# Patient Record
Sex: MALE | Race: WHITE
[De-identification: names, ages, dates, MRNs, and addresses within clinical notes are randomized per-mention and may not be internally consistent; named-entity substitution may affect disease eponyms.]

---

## 2017-04-08 ENCOUNTER — HOSPITAL ENCOUNTER (EMERGENCY)
Dept: HOSPITAL 17 - PHED | Age: 67
Discharge: HOME | End: 2017-04-08
Payer: MEDICARE

## 2017-04-08 VITALS
DIASTOLIC BLOOD PRESSURE: 80 MMHG | SYSTOLIC BLOOD PRESSURE: 138 MMHG | OXYGEN SATURATION: 97 % | HEART RATE: 62 BPM | TEMPERATURE: 98.1 F | RESPIRATION RATE: 18 BRPM

## 2017-04-08 VITALS
OXYGEN SATURATION: 97 % | RESPIRATION RATE: 18 BRPM | DIASTOLIC BLOOD PRESSURE: 84 MMHG | HEART RATE: 66 BPM | SYSTOLIC BLOOD PRESSURE: 141 MMHG

## 2017-04-08 VITALS
DIASTOLIC BLOOD PRESSURE: 104 MMHG | RESPIRATION RATE: 20 BRPM | TEMPERATURE: 98.1 F | OXYGEN SATURATION: 95 % | SYSTOLIC BLOOD PRESSURE: 143 MMHG | HEART RATE: 62 BPM

## 2017-04-08 VITALS
OXYGEN SATURATION: 96 % | HEART RATE: 65 BPM | RESPIRATION RATE: 16 BRPM | DIASTOLIC BLOOD PRESSURE: 49 MMHG | SYSTOLIC BLOOD PRESSURE: 102 MMHG

## 2017-04-08 VITALS
HEART RATE: 62 BPM | TEMPERATURE: 98.1 F | SYSTOLIC BLOOD PRESSURE: 138 MMHG | RESPIRATION RATE: 18 BRPM | DIASTOLIC BLOOD PRESSURE: 80 MMHG | OXYGEN SATURATION: 97 %

## 2017-04-08 VITALS — WEIGHT: 142.2 LBS | BODY MASS INDEX: 25.2 KG/M2 | HEIGHT: 63 IN

## 2017-04-08 DIAGNOSIS — Z86.718: ICD-10-CM

## 2017-04-08 DIAGNOSIS — F32.9: ICD-10-CM

## 2017-04-08 DIAGNOSIS — Z79.01: ICD-10-CM

## 2017-04-08 DIAGNOSIS — R10.9: Primary | ICD-10-CM

## 2017-04-08 DIAGNOSIS — J44.9: ICD-10-CM

## 2017-04-08 DIAGNOSIS — I10: ICD-10-CM

## 2017-04-08 DIAGNOSIS — F41.9: ICD-10-CM

## 2017-04-08 DIAGNOSIS — K21.9: ICD-10-CM

## 2017-04-08 DIAGNOSIS — R51: ICD-10-CM

## 2017-04-08 LAB
ALP SERPL-CCNC: 82 U/L (ref 45–117)
ALT SERPL-CCNC: 25 U/L (ref 12–78)
ANION GAP SERPL CALC-SCNC: 9 MEQ/L (ref 5–15)
APTT BLD: 26.5 SEC (ref 24.3–30.1)
AST SERPL-CCNC: 18 U/L (ref 15–37)
BACTERIA #/AREA URNS HPF: (no result) /HPF
BASOPHILS # BLD AUTO: 0.1 TH/MM3 (ref 0–0.2)
BASOPHILS NFR BLD: 0.6 % (ref 0–2)
BILIRUB SERPL-MCNC: 0.3 MG/DL (ref 0.2–1)
BUN SERPL-MCNC: 25 MG/DL (ref 7–18)
CHLORIDE SERPL-SCNC: 108 MEQ/L (ref 98–107)
COLOR UR: YELLOW
COMMENT (UR): (no result)
CULTURE IF INDICATED: (no result)
EOSINOPHIL # BLD: 0.3 TH/MM3 (ref 0–0.4)
EOSINOPHIL NFR BLD: 3.5 % (ref 0–4)
ERYTHROCYTE [DISTWIDTH] IN BLOOD BY AUTOMATED COUNT: 17.3 % (ref 11.6–17.2)
GFR SERPLBLD BASED ON 1.73 SQ M-ARVRAT: 55 ML/MIN (ref 89–?)
GLUCOSE UR STRIP-MCNC: (no result) MG/DL
HCO3 BLD-SCNC: 24.9 MEQ/L (ref 21–32)
HCT VFR BLD CALC: 39.9 % (ref 39–51)
HEMO FLAGS: (no result)
HGB UR QL STRIP: (no result)
INR PPP: 0.9 RATIO
KETONES UR STRIP-MCNC: (no result) MG/DL
LEUKOCYTE ESTERASE UR QL STRIP: (no result) /HPF (ref 0–5)
LYMPHOCYTES # BLD AUTO: 2.4 TH/MM3 (ref 1–4.8)
LYMPHOCYTES NFR BLD AUTO: 25.8 % (ref 9–44)
MCH RBC QN AUTO: 25.7 PG (ref 27–34)
MCHC RBC AUTO-ENTMCNC: 32.5 % (ref 32–36)
MCV RBC AUTO: 78.9 FL (ref 80–100)
MONOCYTES NFR BLD: 6.3 % (ref 0–8)
NEUTROPHILS # BLD AUTO: 5.8 TH/MM3 (ref 1.8–7.7)
NEUTROPHILS NFR BLD AUTO: 63.8 % (ref 16–70)
NITRITE UR QL STRIP: (no result)
PLATELET # BLD: 195 TH/MM3 (ref 150–450)
POTASSIUM SERPL-SCNC: 4.1 MEQ/L (ref 3.5–5.1)
PROTHROMBIN TIME: 9.9 SEC (ref 9.8–11.6)
RBC # BLD AUTO: 5.06 MIL/MM3 (ref 4.5–5.9)
RBC #/AREA URNS HPF: (no result) /HPF (ref 0–3)
SODIUM SERPL-SCNC: 142 MEQ/L (ref 136–145)
SP GR UR STRIP: 1.02 (ref 1–1.03)
SQUAMOUS #/AREA URNS HPF: (no result) /HPF (ref 0–5)
WBC # BLD AUTO: 9.2 TH/MM3 (ref 4–11)

## 2017-04-08 PROCEDURE — 81001 URINALYSIS AUTO W/SCOPE: CPT

## 2017-04-08 PROCEDURE — 85730 THROMBOPLASTIN TIME PARTIAL: CPT

## 2017-04-08 PROCEDURE — 80053 COMPREHEN METABOLIC PANEL: CPT

## 2017-04-08 PROCEDURE — 74177 CT ABD & PELVIS W/CONTRAST: CPT

## 2017-04-08 PROCEDURE — 93005 ELECTROCARDIOGRAM TRACING: CPT

## 2017-04-08 PROCEDURE — 96374 THER/PROPH/DIAG INJ IV PUSH: CPT

## 2017-04-08 PROCEDURE — 83605 ASSAY OF LACTIC ACID: CPT

## 2017-04-08 PROCEDURE — 85025 COMPLETE CBC W/AUTO DIFF WBC: CPT

## 2017-04-08 PROCEDURE — 99285 EMERGENCY DEPT VISIT HI MDM: CPT

## 2017-04-08 PROCEDURE — 70450 CT HEAD/BRAIN W/O DYE: CPT

## 2017-04-08 PROCEDURE — 83690 ASSAY OF LIPASE: CPT

## 2017-04-08 PROCEDURE — 85610 PROTHROMBIN TIME: CPT

## 2017-04-08 PROCEDURE — 71010: CPT

## 2017-04-08 NOTE — PD
Data


Data


Last Documented VS





Vital Signs








  Date Time  Temp Pulse Resp B/P Pulse Ox O2 Delivery O2 Flow Rate FiO2


 


4/8/17 08:36   16     


 


4/8/17 08:36  65  102/49 96 Room Air  


 


4/8/17 03:57 98.1       








Orders





 Complete Blood Count With Diff (4/8/17 04:17)


Comprehensive Metabolic Panel (4/8/17 04:17)


Lipase (4/8/17 04:17)


Lactic Acid (4/8/17 04:17)


Prothrombin Time / Inr (Pt) (4/8/17 04:17)


Act Partial Throm Time (Ptt) (4/8/17 04:17)


Urinalysis - C+S If Indicated (4/8/17 04:17)


Ct Abd/Pel W Iv Contrast(Rout) (4/8/17 04:17)


Iv Access Insert/Monitor (4/8/17 04:17)


Ecg Monitoring (4/8/17 04:17)


Oximetry (4/8/17 04:17)


Ondansetron Inj (Zofran Inj) (4/8/17 04:30)


Sodium Chloride 0.9% Flush (Ns Flush) (4/8/17 04:30)


Electrocardiogram (4/8/17 04:17)


Chest, Single Ap (4/8/17 04:17)


Iohexol 350 Inj (Omnipaque 350 Inj) (4/8/17 05:20)


Ct Brain W/O Iv Contrast(Rout) (4/8/17 )


Acetaminophen (Tylenol) (4/8/17 07:45)





Labs





 Laboratory Tests








Test 4/8/17 4/8/17





 04:10 04:40


 


White Blood Count 9.2 TH/MM3 


 


Red Blood Count 5.06 MIL/MM3 


 


Hemoglobin 13.0 GM/DL 


 


Hematocrit 39.9 % 


 


Mean Corpuscular Volume 78.9 FL 


 


Mean Corpuscular Hemoglobin 25.7 PG 


 


Mean Corpuscular Hemoglobin 32.5 % 





Concent  


 


Red Cell Distribution Width 17.3 % 


 


Platelet Count 195 TH/MM3 


 


Mean Platelet Volume 10.4 FL 


 


Neutrophils (%) (Auto) 63.8 % 


 


Lymphocytes (%) (Auto) 25.8 % 


 


Monocytes (%) (Auto) 6.3 % 


 


Eosinophils (%) (Auto) 3.5 % 


 


Basophils (%) (Auto) 0.6 % 


 


Neutrophils # (Auto) 5.8 TH/MM3 


 


Lymphocytes # (Auto) 2.4 TH/MM3 


 


Monocytes # (Auto) 0.6 TH/MM3 


 


Eosinophils # (Auto) 0.3 TH/MM3 


 


Basophils # (Auto) 0.1 TH/MM3 


 


CBC Comment DIFF FINAL  


 


Differential Comment   


 


Prothrombin Time 9.9 SEC 


 


Prothromb Time International 0.9 RATIO 





Ratio  


 


Activated Partial 26.5 SEC 





Thromboplast Time  


 


Urine Color YELLOW  


 


Urine Turbidity CLEAR  


 


Urine pH 5.5  


 


Urine Specific Gravity 1.019  


 


Urine Protein NEG mg/dL 


 


Urine Glucose (UA) NEG mg/dL 


 


Urine Ketones TRACE mg/dL 


 


Urine Occult Blood NEG  


 


Urine Nitrite NEG  


 


Urine Bilirubin NEG  


 


Urine Leukocyte Esterase NEG  


 


Urine RBC 0-2 /hpf 


 


Urine WBC 0-2 /hpf 


 


Urine Squamous Epithelial 0-5 /hpf 





Cells  


 


Urine Bacteria NONE /hpf 


 


Microscopic Urinalysis Comment CULT NOT 





 INDICATED 


 


Sodium Level 142 MEQ/L 


 


Potassium Level 4.1 MEQ/L 


 


Chloride Level 108 MEQ/L 


 


Carbon Dioxide Level 24.9 MEQ/L 


 


Anion Gap 9 MEQ/L 


 


Blood Urea Nitrogen 25 MG/DL 


 


Creatinine 1.30 MG/DL 


 


Estimat Glomerular Filtration 55 ML/MIN 





Rate  


 


Random Glucose 108 MG/DL 


 


Calcium Level 9.0 MG/DL 


 


Total Bilirubin 0.3 MG/DL 


 


Aspartate Amino Transf 18 U/L 





(AST/SGOT)  


 


Alanine Aminotransferase 25 U/L 





(ALT/SGPT)  


 


Alkaline Phosphatase 82 U/L 


 


Total Protein 7.2 GM/DL 


 


Albumin 3.9 GM/DL 


 


Lipase 148 U/L 


 


Lactic Acid Level  1.4 mmol/L











[facility]


Supervised Visit with DIDIER:  No


Narrative Course


This case is checked out to me by Dr. Lagos at 7 AM.





I have evaluated the patient and reviewed the entirety of the workup with him.


At this Point he is minimally symptomatic.  Stable for [facility] follow[facility] 

[facility]


Brain CT is negative


Abdominal CT is reviewed with him.  There is a vague right inguinal canal soft 

tissue fullness or hematoma that's hard to specifically define on CT.


There are no objective findings on exam and he has no pain there.  His INR is 

0.9





Stable for outpatient follow-up.  He is to discuss the finding with his 

physician.


Diagnosis





 Primary Impression:  


 Abdominal pain


 Qualified Code:  R10.9 - Abdominal pain, unspecified location


 Additional Impression:  


 Headache


 Qualified Code:  R51 - Acute nonintractable headache, unspecified headache type





***Additional Instruction:


The patient was advised to follow up with their physician and return if they 

worsen.


Discuss CT scan of abdomen findings with your physician


***Med/Other Pt SpecificInfo:  Other


Disposition:  01 DISCHARGE HOME


Condition:  Stable








Roshan Chacko MD Apr 8, 2017 08:43

## 2017-04-08 NOTE — PD
HPI


Chief Complaint:  Abdominal Pain


Time Seen by Provider:  04:17


Travel History


International Travel<30 days:  No


Contact w/Intl Traveler<30days:  No


Traveled to known affect area:  No





History of Present Illness


HPI


66-year-old male presents to the emergency department for complaint of 3 days 

of abdominal pain with intermittent nausea and vomiting.  Patient denies fever 

chills.  Patient's had no diarrhea or constipation.  No report of bilious 

emesis hematemesis or coffee-ground emesis also denies any melena hematochezia.

  Patient denies any dysuria frequency urgency flank pain or hematuria.  

Patient denies any injury or trauma.  Patient reports pain is epigastric and 

periumbilical.  Patient is unable to identify exacerbating or alleviating 

factors.  Patient reports that he has chronic abdominal pain.  Patient takes 

diltiazem for hypertension.  Patient also reports history of COPD anxiety and 

DVT.  Patient is status post appendectomy.  Patient reports he has taken his 

last dose of Coumadin as of Thursday evening.  Patient states he typically has 

[facility] but has not been to the [facility] for at least a month 

or longer.  Patient does not report increased bruising, gingival bleeding, 

epistaxis, hemoptysis, hematemesis, coffee-ground emesis, hematuria, melena or 

hematochezia.





[facility]


Past Medical History


*** Narrative Medical


Medical record and nursing notes reviewed


Hx Anticoagulant Therapy:  Yes (OUT OF COUMADIN PAST COUPLE OF WEEKS)


Asthma:  No


Anxiety:  Yes


Depression:  Yes


Heart Rhythm Problems:  Yes (irregular heart beat)


Cancer:  No


Cardiovascular Problems:  Yes


High Cholesterol:  No


Chest Pain:  No


Congestive Heart Failure:  No


COPD:  Yes


Diminished Hearing:  No


Deep Vein Thrombosis:  Yes (PE)


Endocrine:  No


GERD:  Yes


Genitourinary:  Yes


Hypertension:  Yes


Implanted Vascular Access Dvce:  No


Musculoskeletal:  No


Neurologic:  No


Psychiatric:  Yes


Reproductive:  No


Respiratory:  Yes (COPD)


Immunizations Current:  No


Tetanus Vaccination:  Unknown


Influenza Vaccination:  No





Past Surgical History


Abdominal Surgery:  Yes (APPENDECTOMY AS A CHILD.)


Appendectomy:  Yes


Tonsillectomy:  Yes


Other Surgery:  Yes





Social History


Alcohol Use:  Yes (ABT 4 - 16 OZ DAILY)


Tobacco Use:  Yes (2 PPD)


Substance Use:  No





Allergies-Medications


(Allergen,Severity, Reaction):  


Coded Allergies:  


     No Known Allergies (Verified , 4/8/17)


Reported Meds & Prescriptions





Reported Meds & Active Scripts


Active


Reported


Coumadin (Warfarin) 5 Mg Tab 5 Mg PO DAILY


Diltiazem CD 24  Mg Caper 240 Mg PO DAILY





Review of Systems


Except as stated in HPI:  all other systems reviewed are Neg


General / Constitutional:  No: Fever, Chills


HENT:  No: Congestion


Cardiovascular:  No: Chest Pain or Discomfort


Respiratory:  No: Cough, Shortness of Breath


Gastrointestinal:  Positive: Nausea, Vomiting, Abdominal Pain,  No: Diarrhea, 

Hematemesis, Hematochezia


Genitourinary:  No: Urgency, Frequency, Dysuria, Hematuria


Musculoskeletal:  No: Myalgias, Arthralgias


Skin:  No Rash


Neurologic:  No: Weakness, Dizziness, Syncope


Psychiatric:  No: Anxiety


Endocrine:  No: Heat Intolerance


Hematologic/Lymphatic:  No: Easy Bruising





Physical Exam


Narrative


GENERAL: Well-developed well-nourished nourished disheveled male in no apparent 

distress no respiratory distress


SKIN: Warm and dry.


HEAD: Normocephalic.


EYES: No scleral icterus. No injection or drainage. 


NECK: Supple, trachea midline. No JVD or lymphadenopathy.


CARDIOVASCULAR: Regular rate and rhythm without murmurs, gallops, or rubs. 


RESPIRATORY: Breath sounds equal bilaterally. No accessory muscle use.


GASTROINTESTINAL: Abdomen soft, mild epigastric and focal tenderness no 

guarding no rebound no palpable pulsatile mass, nondistended. 


MUSCULOSKELETAL: No cyanosis, or edema. 


BACK: Nontender without obvious deformity. No CVA tenderness.








Data


Data


Last Documented VS





Vital Signs








  Date Time  Temp Pulse Resp B/P Pulse Ox O2 Delivery O2 Flow Rate FiO2


 


4/8/17 06:24  66 18 141/84 97 Room Air  


 


4/8/17 03:57 98.1       








Orders





 Complete Blood Count With Diff (4/8/17 04:17)


Comprehensive Metabolic Panel (4/8/17 04:17)


Lipase (4/8/17 04:17)


Lactic Acid (4/8/17 04:17)


Prothrombin Time / Inr (Pt) (4/8/17 04:17)


Act Partial Throm Time (Ptt) (4/8/17 04:17)


Urinalysis - C+S If Indicated (4/8/17 04:17)


Ct Abd/Pel W Iv Contrast(Rout) (4/8/17 04:17)


Iv Access Insert/Monitor (4/8/17 04:17)


Ecg Monitoring (4/8/17 04:17)


Oximetry (4/8/17 04:17)


Ondansetron Inj (Zofran Inj) (4/8/17 04:30)


Sodium Chloride 0.9% Flush (Ns Flush) (4/8/17 04:30)


Electrocardiogram (4/8/17 04:17)


Chest, Single Ap (4/8/17 04:17)


Iohexol 350 Inj (Omnipaque 350 Inj) (4/8/17 05:20)


Ct Brain W/O Iv Contrast(Rout) (4/8/17 )





Labs





 Laboratory Tests








Test 4/8/17 4/8/17





 04:10 04:40


 


White Blood Count 9.2 TH/MM3 


 


Red Blood Count 5.06 MIL/MM3 


 


Hemoglobin 13.0 GM/DL 


 


Hematocrit 39.9 % 


 


Mean Corpuscular Volume 78.9 FL 


 


Mean Corpuscular Hemoglobin 25.7 PG 


 


Mean Corpuscular Hemoglobin 32.5 % 





Concent  


 


Red Cell Distribution Width 17.3 % 


 


Platelet Count 195 TH/MM3 


 


Mean Platelet Volume 10.4 FL 


 


Neutrophils (%) (Auto) 63.8 % 


 


Lymphocytes (%) (Auto) 25.8 % 


 


Monocytes (%) (Auto) 6.3 % 


 


Eosinophils (%) (Auto) 3.5 % 


 


Basophils (%) (Auto) 0.6 % 


 


Neutrophils # (Auto) 5.8 TH/MM3 


 


Lymphocytes # (Auto) 2.4 TH/MM3 


 


Monocytes # (Auto) 0.6 TH/MM3 


 


Eosinophils # (Auto) 0.3 TH/MM3 


 


Basophils # (Auto) 0.1 TH/MM3 


 


CBC Comment DIFF FINAL  


 


Differential Comment   


 


Prothrombin Time 9.9 SEC 


 


Prothromb Time International 0.9 RATIO 





Ratio  


 


Activated Partial 26.5 SEC 





Thromboplast Time  


 


Urine Color YELLOW  


 


Urine Turbidity CLEAR  


 


Urine pH 5.5  


 


Urine Specific Gravity 1.019  


 


Urine Protein NEG mg/dL 


 


Urine Glucose (UA) NEG mg/dL 


 


Urine Ketones TRACE mg/dL 


 


Urine Occult Blood NEG  


 


Urine Nitrite NEG  


 


Urine Bilirubin NEG  


 


Urine Leukocyte Esterase NEG  


 


Urine RBC 0-2 /hpf 


 


Urine WBC 0-2 /hpf 


 


Urine Squamous Epithelial 0-5 /hpf 





Cells  


 


Urine Bacteria NONE /hpf 


 


Microscopic Urinalysis Comment CULT NOT 





 INDICATED 


 


Sodium Level 142 MEQ/L 


 


Potassium Level 4.1 MEQ/L 


 


Chloride Level 108 MEQ/L 


 


Carbon Dioxide Level 24.9 MEQ/L 


 


Anion Gap 9 MEQ/L 


 


Blood Urea Nitrogen 25 MG/DL 


 


Creatinine 1.30 MG/DL 


 


Estimat Glomerular Filtration 55 ML/MIN 





Rate  


 


Random Glucose 108 MG/DL 


 


Calcium Level 9.0 MG/DL 


 


Total Bilirubin 0.3 MG/DL 


 


Aspartate Amino Transf 18 U/L 





(AST/SGOT)  


 


Alanine Aminotransferase 25 U/L 





(ALT/SGPT)  


 


Alkaline Phosphatase 82 U/L 


 


Total Protein 7.2 GM/DL 


 


Albumin 3.9 GM/DL 


 


Lipase 148 U/L 


 


Lactic Acid Level  1.4 mmol/L











MDM


Medical Decision Making


Medical Screen Exam Complete:  Yes


Emergency Medical Condition:  Yes


Medical Record Reviewed:  Yes (EKG normal sinus rhythm rate 60 no acute ST 

elevation or injury pattern change noted)


Interpretation(s)


EKG: Sinus rhythm rate 60 no acute ST elevation or injury pattern change noted


Differential Diagnosis


Abdominal pain, gastritis, peptic ulcer disease, cholecystitis, pancreatitis, 

colitis, diverticulitis, UTI, hernia, small bowel obstruction


Narrative Course


IV access obtained specimens collected and sent for resulting; EKG performed 

which reveals no acute ST elevation or injury pattern


CT abdomen and pelvis ordered with IV contrast


CBC with automated differential no leukocytosis anemia Pancytopenia or Marked 

Left Shift; Metabolic Panel Remarkable for Elevation of BUN Otherwise Values 

Grossly within Normal Range; Lactic Acid Is Not Elevated 1.4; INR Is 0.9 Not 

Prolonged; Urinalysis Is Grossly Within Normal Limits


CT Abdomen and Pelvis Reveals No Acute Intra-Abdominal or Pelvic Pathology 

Other Than Radiologist Comments That There Is an Area of Possible Soft Tissue 

Swelling to the Right Groin That May Be Consistent with adenopathy or fluid 

collection or possible hematoma.  Patient is reexamined and has no tenderness 

or soft tissue swelling to the groin area.  Patient reports that he is not here 

because of his abdominal pain he is here because he has a headache.  CT brain 

noncontrast ordered in view of patient's history of Coumadin treatment.  

Patient has no neurologic focality on exam.  Patient is normotensive with out 

evidence of tachycardia.  Hemoglobin is stable.  INR is 0.9 suspects that soft 

tissue swelling is consistent with adenopathy as opposed to fluid collection or 

hematoma.


CT brain noncontrast has been ordered and care signed over to oncoming Nirali Gould MD Apr 8, 2017 04:33

## 2017-04-08 NOTE — RADHPO
EXAM DATE/TIME:  04/08/2017 04:56 

 

[facility] COMPARISON:     

CHEST SINGLE AP, October 10, 2016, 21:36.

 

                     

INDICATIONS :     

Shortness of breath. 

                     

 

MEDICAL HISTORY :     

Hypertension.  Chronic obstructive pulmonary disease.  Deep venous thrombosis.      

 

SURGICAL HISTORY :     

Appendectomy.   

 

ENCOUNTER:     

Initial                                        

 

ACUITY:     

1 day      

 

PAIN SCORE:     

6/10

 

LOCATION:     

Bilateral chest 

 

FINDINGS:     

A single view of the chest demonstrates the lungs to be symmetrically aerated without evidence of mas
s, infiltrate or effusion.  The cardiomediastinal contours are unremarkable. There is minimal persist
ent blunting of the left costophrenic angle. There is chronic remodeling at the humeral heads bilater
ally with degenerative change at the glenohumeral joints.

 

CONCLUSION:     

Minimal blunting of the left costophrenic angle

 

 

 

 Jorge Phan MD on April 08, 2017 at 5:09           

Board Certified Radiologist.

 This report was verified electronically.

## 2017-04-08 NOTE — RADHPO
EXAM DATE/TIME:  04/08/2017 05:03 

 

[facility] COMPARISON:     

CT ABDOMEN & PELVIS W CONTRAST, May 05, 2011, 16:32.

 

 

INDICATIONS :     

Abdominal pain with nausea and vomiting. 

                      

 

IV CONTRAST:     

95 cc Omnipaque 350 (iohexol) IV 

 

 

ORAL CONTRAST:      

No oral contrast ingested.

                      

 

RADIATION DOSE:     

13.71 CTDIvol (mGy) 

 

 

MEDICAL HISTORY :     

Deep venous thrombosis. Chronic obstructive pulmonary disease. Gastroesophageal reflux disease.

 

SURGICAL HISTORY :      

Appendectomy. 

 

ENCOUNTER:      

Initial

 

ACUITY:      

1 day

 

PAIN SCALE:      

7/10

 

LOCATION:         

Abdomen. 

 

TECHNIQUE:     

Volumetric scanning of the abdomen and pelvis was performed.  Using automated exposure control and ad
justment of the mA and/or kV according to patient size, radiation dose was kept as low as reasonably 
achievable to obtain optimal diagnostic quality images. 

 

FINDINGS:     

 

LOWER LUNGS:     

The visualized lower lungs are clear.

 

LIVER:     

Homogeneous density without lesion.  There is no dilation of the biliary tree.  No calcified gallston
es.

 

SPLEEN:     

Normal size without lesion.

 

PANCREAS:     

Within normal limits.

 

KIDNEYS:     

Normal in size and shape.  There is no mass, stone or hydronephrosis.

 

ADRENAL GLANDS:     

Within normal limits.

 

VASCULAR:     

There is no aortic aneurysm. There are scattered vascular calcifications seen.

 

BOWEL/MESENTERY:     

The stomach, small bowel, and colon demonstrate no acute abnormality.  There is no free intraperitone
al air or fluid.

 

ABDOMINAL WALL:     

Within normal limits.

 

RETROPERITONEUM:     

There is no lymphadenopathy. There is an IVC filter in place.

 

BLADDER:     

No wall thickening or mass. 

 

REPRODUCTIVE:     

Within normal limits.

 

INGUINAL:     

There is 3.1 cm lobulated soft tissue density in the right inguinal region. This does not clearly rep
resent bowel.

 

MUSCULOSKELETAL:     

There is degenerative change in the lumbar spine.

 

CONCLUSION:     

1. Lobulated soft tissue density in the right inguinal canal potentially related to adenopathy or com
plex fluid. An evolving hematoma could have this appearance. This does not appear to be related to silvino
wel.

2. Atherosclerotic change throughout the arterial system.

3. Degenerative change in the lumbar spine.

 

 

 

 Jorge Phan MD on April 08, 2017 at 5:34           

Board Certified Radiologist.

 This report was verified electronically.

## 2017-04-08 NOTE — RADHPO
EXAM DATE/TIME:  04/08/2017 07:51 

 

[facility] COMPARISON:     

No previous studies available for comparison.

 

 

INDICATIONS :     

Cephalgia. 

                    

 

RADIATION DOSE:     

60.67 CTDIvol (mGy) 

 

 

 

MEDICAL HISTORY :     

Chronic obstructive pulmonary disease. Gastroesophageal reflux disease. Deep venous thrombosis.

 

SURGICAL HISTORY :      

Appendectomy. 

 

ENCOUNTER:      

Initial

 

ACUITY:      

1 day

 

PAIN SCALE:      

2/10

 

LOCATION:        

cranial 

 

TECHNIQUE:     

Multiple contiguous axial images were obtained of the head.  Using automated exposure control and adj
ustment of the mA and/or kV according to patient size, radiation dose was kept as low as reasonably a
chievable to obtain optimal diagnostic quality images. 

 

FINDINGS:     

 

CEREBRUM:     

The ventricles are normal for age.  No evidence of midline shift, mass lesion, hemorrhage or acute in
farction.  No extra-axial fluid collections are seen.

 

POSTERIOR FOSSA:     

The cerebellum and brainstem are intact.  The 4th ventricle is midline.  The cerebellopontine angle i
s unremarkable.

 

EXTRACRANIAL:     

The visualized portion of the orbits is intact.

 

SKULL:     

The calvaria is intact.  No evidence of skull fracture.

 

CONCLUSION:     

Normal examination.  

 

 

 

 Kim Busby MD on April 08, 2017 at 8:29           

Board Certified Radiologist.

 This report was verified electronically.

## 2017-04-08 NOTE — EKG
Date Performed: 04/08/2017       Time Performed: 04:26:04

 

PTAGE:      66 years

 

EKG:      Possible ectopic atrial rhythm. Lateral T wave changes are nonspecific Compared to prior tr
acing no significant change Borderline ECG

 

PREVIOUS TRACING       : 10/11/2016 04.47

 

DOCTOR:   Thaddeus Moran  Interpretating Date/Time  04/08/2017 14:34:00

## 2018-02-12 ENCOUNTER — HOSPITAL ENCOUNTER (INPATIENT)
Dept: HOSPITAL 17 - NEPC | Age: 68
LOS: 2 days | Discharge: NURSING, REHAB OR LONG-TERM CARE | DRG: 189 | End: 2018-02-14
Attending: HOSPITALIST | Admitting: HOSPITALIST
Payer: MEDICARE

## 2018-02-12 VITALS
SYSTOLIC BLOOD PRESSURE: 103 MMHG | OXYGEN SATURATION: 100 % | HEART RATE: 95 BPM | RESPIRATION RATE: 35 BRPM | TEMPERATURE: 97.8 F | DIASTOLIC BLOOD PRESSURE: 77 MMHG

## 2018-02-12 VITALS
SYSTOLIC BLOOD PRESSURE: 126 MMHG | DIASTOLIC BLOOD PRESSURE: 87 MMHG | HEART RATE: 108 BPM | TEMPERATURE: 98.3 F | RESPIRATION RATE: 15 BRPM | OXYGEN SATURATION: 100 %

## 2018-02-12 VITALS
TEMPERATURE: 99.5 F | DIASTOLIC BLOOD PRESSURE: 62 MMHG | OXYGEN SATURATION: 100 % | RESPIRATION RATE: 18 BRPM | HEART RATE: 80 BPM | SYSTOLIC BLOOD PRESSURE: 113 MMHG

## 2018-02-12 VITALS
OXYGEN SATURATION: 92 % | DIASTOLIC BLOOD PRESSURE: 98 MMHG | SYSTOLIC BLOOD PRESSURE: 182 MMHG | RESPIRATION RATE: 18 BRPM | HEART RATE: 158 BPM

## 2018-02-12 VITALS
RESPIRATION RATE: 37 BRPM | TEMPERATURE: 98.5 F | HEART RATE: 105 BPM | SYSTOLIC BLOOD PRESSURE: 124 MMHG | OXYGEN SATURATION: 88 % | DIASTOLIC BLOOD PRESSURE: 82 MMHG

## 2018-02-12 VITALS — HEART RATE: 80 BPM

## 2018-02-12 VITALS
DIASTOLIC BLOOD PRESSURE: 76 MMHG | OXYGEN SATURATION: 97 % | SYSTOLIC BLOOD PRESSURE: 133 MMHG | HEART RATE: 89 BPM | RESPIRATION RATE: 22 BRPM | TEMPERATURE: 98.4 F

## 2018-02-12 VITALS — OXYGEN SATURATION: 98 %

## 2018-02-12 VITALS — HEIGHT: 63 IN | WEIGHT: 127.87 LBS | BODY MASS INDEX: 22.66 KG/M2

## 2018-02-12 VITALS
HEART RATE: 81 BPM | OXYGEN SATURATION: 100 % | DIASTOLIC BLOOD PRESSURE: 62 MMHG | SYSTOLIC BLOOD PRESSURE: 102 MMHG | RESPIRATION RATE: 18 BRPM

## 2018-02-12 VITALS — HEART RATE: 153 BPM

## 2018-02-12 VITALS — DIASTOLIC BLOOD PRESSURE: 58 MMHG | OXYGEN SATURATION: 94 % | HEART RATE: 85 BPM | SYSTOLIC BLOOD PRESSURE: 100 MMHG

## 2018-02-12 VITALS — OXYGEN SATURATION: 94 %

## 2018-02-12 VITALS — OXYGEN SATURATION: 100 %

## 2018-02-12 VITALS — HEART RATE: 122 BPM

## 2018-02-12 DIAGNOSIS — I48.91: ICD-10-CM

## 2018-02-12 DIAGNOSIS — K21.9: ICD-10-CM

## 2018-02-12 DIAGNOSIS — J44.1: ICD-10-CM

## 2018-02-12 DIAGNOSIS — E87.2: ICD-10-CM

## 2018-02-12 DIAGNOSIS — Z86.711: ICD-10-CM

## 2018-02-12 DIAGNOSIS — F10.20: ICD-10-CM

## 2018-02-12 DIAGNOSIS — I50.9: ICD-10-CM

## 2018-02-12 DIAGNOSIS — R74.8: ICD-10-CM

## 2018-02-12 DIAGNOSIS — N17.9: ICD-10-CM

## 2018-02-12 DIAGNOSIS — J98.11: ICD-10-CM

## 2018-02-12 DIAGNOSIS — F32.9: ICD-10-CM

## 2018-02-12 DIAGNOSIS — F41.9: ICD-10-CM

## 2018-02-12 DIAGNOSIS — Z86.718: ICD-10-CM

## 2018-02-12 DIAGNOSIS — R73.9: ICD-10-CM

## 2018-02-12 DIAGNOSIS — J96.02: Primary | ICD-10-CM

## 2018-02-12 DIAGNOSIS — Z79.01: ICD-10-CM

## 2018-02-12 DIAGNOSIS — I16.1: ICD-10-CM

## 2018-02-12 DIAGNOSIS — I11.0: ICD-10-CM

## 2018-02-12 DIAGNOSIS — F17.200: ICD-10-CM

## 2018-02-12 DIAGNOSIS — Z91.19: ICD-10-CM

## 2018-02-12 LAB
BASOPHILS # BLD AUTO: 0 TH/MM3 (ref 0–0.2)
BASOPHILS # BLD AUTO: 0.1 TH/MM3 (ref 0–0.2)
BASOPHILS NFR BLD: 0.2 % (ref 0–2)
BASOPHILS NFR BLD: 0.5 % (ref 0–2)
BUN SERPL-MCNC: 33 MG/DL (ref 7–18)
BUN SERPL-MCNC: 35 MG/DL (ref 7–18)
CALCIUM SERPL-MCNC: 7.8 MG/DL (ref 8.5–10.1)
CALCIUM SERPL-MCNC: 8.2 MG/DL (ref 8.5–10.1)
CHLORIDE SERPL-SCNC: 103 MEQ/L (ref 98–107)
CHLORIDE SERPL-SCNC: 106 MEQ/L (ref 98–107)
COLOR UR: (no result)
CREAT SERPL-MCNC: 1.25 MG/DL (ref 0.6–1.3)
CREAT SERPL-MCNC: 1.34 MG/DL (ref 0.6–1.3)
EOSINOPHIL # BLD: 0.1 TH/MM3 (ref 0–0.4)
EOSINOPHIL # BLD: 0.1 TH/MM3 (ref 0–0.4)
EOSINOPHIL NFR BLD: 0.3 % (ref 0–4)
EOSINOPHIL NFR BLD: 0.5 % (ref 0–4)
ERYTHROCYTE [DISTWIDTH] IN BLOOD BY AUTOMATED COUNT: 19.1 % (ref 11.6–17.2)
ERYTHROCYTE [DISTWIDTH] IN BLOOD BY AUTOMATED COUNT: 20.1 % (ref 11.6–17.2)
GFR SERPLBLD BASED ON 1.73 SQ M-ARVRAT: 53 ML/MIN (ref 89–?)
GFR SERPLBLD BASED ON 1.73 SQ M-ARVRAT: 57 ML/MIN (ref 89–?)
GLUCOSE SERPL-MCNC: 208 MG/DL (ref 74–106)
GLUCOSE SERPL-MCNC: 82 MG/DL (ref 74–106)
GLUCOSE UR STRIP-MCNC: (no result) MG/DL
HCO3 BLD-SCNC: 26.1 MEQ/L (ref 21–32)
HCO3 BLD-SCNC: 31.3 MEQ/L (ref 21–32)
HCT VFR BLD CALC: 37 % (ref 39–51)
HCT VFR BLD CALC: 38.5 % (ref 39–51)
HGB BLD-MCNC: 11.5 GM/DL (ref 13–17)
HGB BLD-MCNC: 11.9 GM/DL (ref 13–17)
HGB UR QL STRIP: (no result)
HYALINE CASTS #/AREA URNS LPF: 1 /LPF
INR PPP: 1.1 RATIO
KETONES UR STRIP-MCNC: (no result) MG/DL
LYMPHOCYTES # BLD AUTO: 0.9 TH/MM3 (ref 1–4.8)
LYMPHOCYTES # BLD AUTO: 1.5 TH/MM3 (ref 1–4.8)
LYMPHOCYTES NFR BLD AUTO: 6.5 % (ref 9–44)
LYMPHOCYTES NFR BLD AUTO: 7.6 % (ref 9–44)
MAGNESIUM SERPL-MCNC: 2.2 MG/DL (ref 1.5–2.5)
MCH RBC QN AUTO: 23.1 PG (ref 27–34)
MCH RBC QN AUTO: 23.4 PG (ref 27–34)
MCHC RBC AUTO-ENTMCNC: 30.9 % (ref 32–36)
MCHC RBC AUTO-ENTMCNC: 31.1 % (ref 32–36)
MCV RBC AUTO: 74.2 FL (ref 80–100)
MCV RBC AUTO: 75.8 FL (ref 80–100)
MONOCYTE #: 0.4 TH/MM3 (ref 0–0.9)
MONOCYTE #: 0.5 TH/MM3 (ref 0–0.9)
MONOCYTES NFR BLD: 2.3 % (ref 0–8)
MONOCYTES NFR BLD: 3.8 % (ref 0–8)
MUCOUS THREADS #/AREA URNS LPF: (no result) /LPF
NEUTROPHILS # BLD AUTO: 11.8 TH/MM3 (ref 1.8–7.7)
NEUTROPHILS # BLD AUTO: 17.4 TH/MM3 (ref 1.8–7.7)
NEUTROPHILS NFR BLD AUTO: 89 % (ref 16–70)
NEUTROPHILS NFR BLD AUTO: 89.3 % (ref 16–70)
NITRITE UR QL STRIP: (no result)
PLATELET # BLD: 116 TH/MM3 (ref 150–450)
PLATELET # BLD: 161 TH/MM3 (ref 150–450)
PMV BLD AUTO: 10.1 FL (ref 7–11)
PMV BLD AUTO: 9.2 FL (ref 7–11)
PROTHROMBIN TIME: 11.3 SEC (ref 9.8–11.6)
RBC # BLD AUTO: 4.98 MIL/MM3 (ref 4.5–5.9)
RBC # BLD AUTO: 5.07 MIL/MM3 (ref 4.5–5.9)
SODIUM SERPL-SCNC: 143 MEQ/L (ref 136–145)
SODIUM SERPL-SCNC: 145 MEQ/L (ref 136–145)
SP GR UR STRIP: 1.01 (ref 1–1.03)
SQUAMOUS #/AREA URNS HPF: <1 /HPF (ref 0–5)
TROPONIN I SERPL-MCNC: 0.11 NG/ML (ref 0.02–0.05)
TROPONIN I SERPL-MCNC: 0.18 NG/ML (ref 0.02–0.05)
URINE LEUKOCYTE ESTERASE: (no result)
WBC # BLD AUTO: 13.2 TH/MM3 (ref 4–11)
WBC # BLD AUTO: 19.5 TH/MM3 (ref 4–11)

## 2018-02-12 PROCEDURE — 83735 ASSAY OF MAGNESIUM: CPT

## 2018-02-12 PROCEDURE — 93306 TTE W/DOPPLER COMPLETE: CPT

## 2018-02-12 PROCEDURE — 85027 COMPLETE CBC AUTOMATED: CPT

## 2018-02-12 PROCEDURE — 94060 EVALUATION OF WHEEZING: CPT

## 2018-02-12 PROCEDURE — 82805 BLOOD GASES W/O2 SATURATION: CPT

## 2018-02-12 PROCEDURE — 83605 ASSAY OF LACTIC ACID: CPT

## 2018-02-12 PROCEDURE — 87040 BLOOD CULTURE FOR BACTERIA: CPT

## 2018-02-12 PROCEDURE — 71045 X-RAY EXAM CHEST 1 VIEW: CPT

## 2018-02-12 PROCEDURE — 81001 URINALYSIS AUTO W/SCOPE: CPT

## 2018-02-12 PROCEDURE — 82948 REAGENT STRIP/BLOOD GLUCOSE: CPT

## 2018-02-12 PROCEDURE — 36600 WITHDRAWAL OF ARTERIAL BLOOD: CPT

## 2018-02-12 PROCEDURE — 94640 AIRWAY INHALATION TREATMENT: CPT

## 2018-02-12 PROCEDURE — 85610 PROTHROMBIN TIME: CPT

## 2018-02-12 PROCEDURE — 5A09357 ASSISTANCE WITH RESPIRATORY VENTILATION, LESS THAN 24 CONSECUTIVE HOURS, CONTINUOUS POSITIVE AIRWAY PRESSURE: ICD-10-PCS | Performed by: EMERGENCY MEDICINE

## 2018-02-12 PROCEDURE — 94002 VENT MGMT INPAT INIT DAY: CPT

## 2018-02-12 PROCEDURE — 94664 DEMO&/EVAL PT USE INHALER: CPT

## 2018-02-12 PROCEDURE — 83880 ASSAY OF NATRIURETIC PEPTIDE: CPT

## 2018-02-12 PROCEDURE — 84100 ASSAY OF PHOSPHORUS: CPT

## 2018-02-12 PROCEDURE — 87641 MR-STAPH DNA AMP PROBE: CPT

## 2018-02-12 PROCEDURE — 84484 ASSAY OF TROPONIN QUANT: CPT

## 2018-02-12 PROCEDURE — 85025 COMPLETE CBC W/AUTO DIFF WBC: CPT

## 2018-02-12 PROCEDURE — 80048 BASIC METABOLIC PNL TOTAL CA: CPT

## 2018-02-12 PROCEDURE — 93005 ELECTROCARDIOGRAM TRACING: CPT

## 2018-02-12 PROCEDURE — 71275 CT ANGIOGRAPHY CHEST: CPT

## 2018-02-12 RX ADMIN — HUMAN INSULIN SCH: 100 INJECTION, SOLUTION SUBCUTANEOUS at 23:35

## 2018-02-12 RX ADMIN — RIVAROXABAN SCH MG: 10 TABLET, FILM COATED ORAL at 20:41

## 2018-02-12 RX ADMIN — LEVOFLOXACIN SCH MG: 750 TABLET, FILM COATED ORAL at 13:04

## 2018-02-12 RX ADMIN — STANDARDIZED SENNA CONCENTRATE AND DOCUSATE SODIUM SCH TAB: 8.6; 5 TABLET, FILM COATED ORAL at 10:00

## 2018-02-12 RX ADMIN — METHYLPREDNISOLONE SODIUM SUCCINATE SCH MG: 40 INJECTION, POWDER, FOR SOLUTION INTRAMUSCULAR; INTRAVENOUS at 13:05

## 2018-02-12 RX ADMIN — IPRATROPIUM BROMIDE AND ALBUTEROL SULFATE SCH AMPULE: .5; 3 SOLUTION RESPIRATORY (INHALATION) at 14:28

## 2018-02-12 RX ADMIN — IPRATROPIUM BROMIDE AND ALBUTEROL SULFATE SCH AMPULE: .5; 3 SOLUTION RESPIRATORY (INHALATION) at 19:42

## 2018-02-12 RX ADMIN — STANDARDIZED SENNA CONCENTRATE AND DOCUSATE SODIUM SCH TAB: 8.6; 5 TABLET, FILM COATED ORAL at 20:41

## 2018-02-12 RX ADMIN — IPRATROPIUM BROMIDE AND ALBUTEROL SULFATE SCH AMPULE: .5; 3 SOLUTION RESPIRATORY (INHALATION) at 10:41

## 2018-02-12 RX ADMIN — FAMOTIDINE SCH MG: 10 INJECTION, SOLUTION INTRAVENOUS at 20:41

## 2018-02-12 RX ADMIN — METOPROLOL TARTRATE SCH MG: 25 TABLET, FILM COATED ORAL at 17:12

## 2018-02-12 RX ADMIN — METHYLPREDNISOLONE SODIUM SUCCINATE SCH MG: 40 INJECTION, POWDER, FOR SOLUTION INTRAMUSCULAR; INTRAVENOUS at 17:12

## 2018-02-12 RX ADMIN — HUMAN INSULIN SCH: 100 INJECTION, SOLUTION SUBCUTANEOUS at 17:12

## 2018-02-12 RX ADMIN — METOPROLOL TARTRATE SCH MG: 25 TABLET, FILM COATED ORAL at 20:41

## 2018-02-12 RX ADMIN — DILTIAZEM HYDROCHLORIDE SCH MG: 60 TABLET, FILM COATED ORAL at 19:19

## 2018-02-12 RX ADMIN — BUDESONIDE AND FORMOTEROL FUMARATE DIHYDRATE SCH PUFF: 160; 4.5 AEROSOL RESPIRATORY (INHALATION) at 20:41

## 2018-02-12 RX ADMIN — DILTIAZEM HYDROCHLORIDE SCH MG: 60 TABLET, FILM COATED ORAL at 23:35

## 2018-02-12 RX ADMIN — HUMAN INSULIN SCH: 100 INJECTION, SOLUTION SUBCUTANEOUS at 12:00

## 2018-02-12 RX ADMIN — HUMAN INSULIN SCH: 100 INJECTION, SOLUTION SUBCUTANEOUS at 10:30

## 2018-02-12 RX ADMIN — BUDESONIDE AND FORMOTEROL FUMARATE DIHYDRATE SCH PUFF: 160; 4.5 AEROSOL RESPIRATORY (INHALATION) at 10:30

## 2018-02-12 NOTE — MB
cc:

IVONNE GIBBS

****

 

 

DATE OF CONSULTATION

2/12/2018

 

REQUESTING PHYSICIAN

Dr. Norton

 

REASON FOR CONSULTATION

Evaluation of COPD exacerbation.

 

HISTORY OF THE PRESENT ILLNESS

Mr. Guerrero is a 67-year-old male with longstanding history of COPD, nicotine

use continues to smoke one pack of cigarettes a day. He has a history of DVT

and has IVC filter placed.  He stopped taking his anticoagulation.  He came to

the emergency room with shortness of breath. He said that he was getting out of

his RV and suddenly he felt loss of his breathing. He was brought to the

emergency room and was found to be in atrial fibrillation with rapid

ventricular rate, also had hypercarbia.  The patient was admitted in intensive

care unit. His rate is much better controlled now.

 

LABORATORY DATA

His lab evaluation revealed WBC count 13.2, hemoglobin 11.5, hematocrit 37, MCV

74, platelet count of 116.

 

Sodium 145, potassium 4.1, chloride 103, CO2 31, BUN 33, creatinine 1.25,

glucose 82.

 

Blood gas pH 7.34, pCO2 48, pCO2 413.

 

IMAGING

His CTA of the chest shows no pulmonary embolism. It showed minimal changes in

the right lung.

 

PAST MEDICAL HISTORY

Significant for:

1. History of COPD.

2. History of DVT not on any anticoagulation.

3. IVC filter placement.

4. Appendectomy.

5. Hernia surgery.

6. Anxiety.

7. Depression.

 

MEDICATIONS

1. The patient uses inhaler off and on. [facility]. Currently

   he is on Famotidine 10 milligrams.

2. Xarelto 10 mg twice a day.

3. Lopressor 25 mg q. 12-hour.

4. Albuterol/Atrovent nebulizer treatment.

5. Levaquin 750 milligrams a day.

6. Solu-Medrol 40 mg q.8 h.

7. Symbicort 160/4.5 two puffs twice a day.

8. He is on insulin coverage.

 

ALLERGIES

NO KNOWN DRUG ALLERGIES.

 

SOCIAL HISTORY

He has been  twice, lives alone. History of smoking one pack a day and

he drinks significant amount.

 

FAMILY HISTORY

He has two children.

 

REVIEW OF SYSTEMS

Normally up, around and active.  No history of malignancy.  No seizure or

epilepsy.

 

PHYSICAL EXAMINATION

GENERAL: Moderately built, well-nourished male mild short of breath not in

acute distress.

VITAL SIGNS: Blood pressure 126/87, heart rate 108, respirations 18,

temperature 98.2.

HEENT: Pupils are equal and reactive to light. He has bilateral cataracts. Oral

mucosa and nasal mucosa normal.

NECK: Supple. JVD not raised.

CHEST: He has expiratory rhonchi.

CARDIOVASCULAR:  S1-S2 normal.

ABDOMEN: Soft. Nontender, nondistended.  Bowel sounds are present.

EXTREMITIES: No edema.

CNS:  Alert , oriented,  no focal deficit.

 

IMPRESSION

1. COPD with exacerbation.

2. Respiratory acidosis.

3. Atrial fibrillation with rapid ventricular rate.

4. History of deep venous thrombosis.

5. Status post IVC filter placement.

 

PLAN

I advised him strongly to quit smoking. And advised compliance with

medications. Continue IV Solu-Medrol and aerosol treatment.   Continue

antibiotic.  He is on Xarelto 10 mg twice a day but he is not sure he would

want to continue with anticoagulation. Once he gets better we will check his

pulmonary function study.

 

Further treatment will depend on the course in the hospital.  Thank you Dr. Norton for this consultation.

 

 

 

                              _________________________________

                              MD MILENA Israel/ZACARIAS

D:  2/12/2018/5:03 PM

T:  2/12/2018/7:16 PM

Visit #:  [id number]

Job #:  [id number]

YAJAIRA

## 2018-02-12 NOTE — MB
cc:

ARYAN HOGUE

****

 

 

DATE OF CONSULTATION

2/12/2018

 

HISTORY

Mr. Guerrero is a 67-year-old white male with a history of pulmonary embolism,

left lower extremity DVT, COPD and anxiety.  He has had increased shortness of

breath for at least 3 days prior to admission.  He was found to have severe

hypertension.  He was in atrial fibrillation with rapid ventricle response.  He

is aware of previous history of atrial fibrillation.  He quit smoking several

days ago but he is using alcohol heavily.

 

PAST MEDICAL HISTORY

Positive for:

1. Pulmonary embolism.

2. Deep venous thrombosis.

3. COPD.

4. Atrial fibrillation.

5. Anxiety.

6. Depression.

7. History of hernia repair.

8. Tonsillectomy.

9. Appendectomy.

 

MEDICATIONS

1. Xarelto.

2. Lasix.

3. Medrol Dosepak,

4. Omeprazole.

5. Albuterol.

 

ALLERGIES

None.

 

SOCIAL HISTORY

The patient quit smoking in the last month.  He uses alcohol excessively.

 

FAMILY HISTORY

Negative for heart disease.

 

REVIEW OF SYSTEMS

Otherwise negative.

 

PHYSICAL EXAMINATION

VITAL SIGNS: Blood pressure 126/87, pulse 140 and irregular.  HEENT:  Negative.

 

NECK: 2+ carotid upstrokes.  No bruits.

LUNGS: Clear.

CARDIOVASCULAR:  Heart irregularly irregular. Tachycardic. No murmur, gallop or

rub.

ABDOMEN: Soft. No bruits.

EXTREMITIES: Without edema. 1-2+ distal pulses.

NEUROLOGIC:  Grossly nonfocal.

 

EKG was reviewed and showed atrial fibrillation with rapid ventricular response

and nonspecific ST-T changes.

 

LABORATORY DATA

Hemoglobin 11.5.

 

Potassium 4.1, creatinine 1.25.

 

Troponin 0.11 and 0.18.

 

.

 

DIAGNOSIS

1. Acute respiratory failure.

2. Chronic obstructive pulmonary disease exacerbation.

3. Acute congestive heart failure.

4. Atrial fibrillation with rapid ventricular response.

5. Hypertension.

6. History of deep venous thrombosis and pulmonary embolism.

7. Anxiety.

 

DISPOSITION

Mr. Guerrero will be monitored on telemetry. His troponin is slightly elevated

which is likely due to his congestive heart failure and atrial fibrillation

with rapid ventricular response.  He has not had any chest discomfort.  He will

be treated with antibiotics for his COPD exacerbation.  We will start diltiazem

for rate control.  He will be monitored in the ICU.  I will follow him for

cardiology during his hospitalization.  We will continue anticoagulation with

Xarelto due to his history of DVT and PE.

 

 

 

                              _________________________________

                              MD CHICO Wright

D:  2/12/2018/6:10 PM

T:  2/12/2018/9:46 PM

Visit #:  [id number]

Job #:  [id number]

## 2018-02-12 NOTE — PD
HPI


Chief Complaint:  Respiratory Distress


Time Seen by Provider:  05:32


Travel History


International Travel<30 days:  No


Contact w/Intl Traveler<30days:  No


Traveled to known affect area:  No





History of Present Illness


HPI


67-year-old male was brought to the emergency room by EMS from the nursing home 

emergently for respiratory distress.  He was found by the nursing [facility] staff to 

be in acute respiratory distress and diaphoretic.  Patient has history of 

congestive heart failure.  When EMS arrived his blood pressure was 208 

systolic.  As per them audible rales could be heard from a distance.  They gave 

him 2 sublingual nitroglycerin, 75 mg of IV Lasix and put him on a BiPAP and 

transported him.  Prior to BiPAP his blood pressure was 81% on room air.  After 

being on BiPAP it improved to 97%.  As per them there was a significant 

improvement with all the intervention that they have done.  When patient 

arrived to the emergency room he was so short of breath.  He denied of any 

chest pain.  Patient was in rapid A. fib on the cardiac monitor.  Blood 

pressure was 180s systolic.  Patient could not give good history given his 

respiratory distress.





PFSH


Past Medical History


*** Narrative Medical


List of his past medical, surgical, social and family history as reviewed from 

the nursing note.


Hx Anticoagulant Therapy:  Yes (OUT OF COUMADIN PAST COUPLE OF WEEKS)


Asthma:  No


Anxiety:  Yes


Depression:  Yes


Heart Rhythm Problems:  Yes (irregular heart beat)


Cancer:  No


Cardiovascular Problems:  Yes


High Cholesterol:  No


Chest Pain:  No


Congestive Heart Failure:  No


COPD:  Yes


Diminished Hearing:  No


Deep Vein Thrombosis:  Yes (PE)


Endocrine:  No


GERD:  Yes


Genitourinary:  Yes


Hypertension:  Yes


Implanted Vascular Access Dvce:  No


Musculoskeletal:  No


Neurologic:  No


Psychiatric:  Yes


Reproductive:  No


Respiratory:  Yes (COPD)


Immunizations Current:  No


Tetanus Vaccination:  < 5 Years


Influenza Vaccination:  No





Past Surgical History


Abdominal Surgery:  Yes (APPENDECTOMY AS A CHILD.)


Appendectomy:  Yes


Tonsillectomy:  Yes


Other Surgery:  Yes





Social History


Alcohol Use:  Yes (ABT 4 - 16 OZ DAILY)


Tobacco Use:  Yes (2 PPD)


Substance Use:  No





Allergies-Medications


(Allergen,Severity, Reaction):  


Coded Allergies:  


     No Known Allergies (Verified  Allergy, Unknown, 2/12/18)


Comments


No known drug allergies.


Reported Meds & Prescriptions





Reported Meds & Active Scripts


Active


Reported


Omeprazole 20 Mg Tab 20 Mg PO DAILY


Albuterol Neb (Albuterol Sulfate) 2.5 Mg/0.5 Ml Neb 2.5 Mg NEB Q6HR NEB


     Note: The Albuterol Sulfate Inhalation Solution is concentrated and


     must be diluted. Read complete instructions carefully before using.





Narrative Medication


list of his home medications reviewed from the nursing note.





Review of Systems


Except as stated in HPI:  all other systems reviewed are Neg


Respiratory:  Positive: Shortness of Breath





Physical Exam


Narrative


GENERAL: Awake, alert, significant distress, anxious


SKIN: Focused skin assessment warm/dry.  Diaphoretic


HEAD: Atraumatic. Normocephalic. 


EYES: Pupils equal and round. No scleral icterus. No injection or drainage. 


ENT: No nasal bleeding or discharge.  Mucous membranes pink and moist.


NECK: Trachea midline. No JVD. 


CARDIOVASCULAR: Irregularly irregular rhythm, tachycardia.  No murmur 

appreciated.


RESPIRATORY: Tachypnea, respiratory distress, accessory muscles for respiration 

used.  Fine crackles all the way up to the apex bilaterally


GASTROINTESTINAL: Abdomen soft, non-tender, nondistended. Hepatic and splenic 

margins not palpable. 


MUSCULOSKELETAL: No obvious deformities. No clubbing.  No cyanosis.  No edema. 


NEUROLOGICAL: Awake and alert. No obvious cranial nerve deficits.  Motor 

grossly within normal limits. Normal speech.


PSYCHIATRIC: Appropriate mood and affect; insight and judgment normal.





Data


Data


Last Documented VS





Vital Signs








  Date Time  Temp Pulse Resp B/P (MAP) Pulse Ox O2 Delivery O2 Flow Rate FiO2


 


2/12/18 08:15     98 BiPAP  40


 


2/12/18 07:50  85  100/58 (72)    


 


2/12/18 06:28 99.5  18     








Orders





 Orders


Complete Blood Count With Diff (2/12/18 05:32)


Basic Metabolic Panel (Bmp) (2/12/18 05:32)


B-Type Natriuretic Peptide (2/12/18 05:32)


Prothrombin Time / Inr (Pt) (2/12/18 05:32)


Magnesium (Mg) (2/12/18 05:32)


Troponin I (2/12/18 05:32)


Arterial Blood Gas (Abg) (2/12/18 05:32)


Urinalysis - C+S If Indicated (2/12/18 05:32)


Iv Access Insert/Monitor (2/12/18 05:32)


Electrocardiogram (2/12/18 05:32)


Ecg Monitoring (2/12/18 05:32)


Oximetry (2/12/18 05:32)


Oxygen Administration (2/12/18 05:32)


Chest, Single Ap (2/12/18 05:32)


Sodium Chloride 0.9% Flush (Ns Flush) (2/12/18 05:45)


Resp Bipap / Cpap Non Invas Vt (2/12/18 05:32)


Vital Signs (Adult) Q15MX4,Q4H (2/12/18 05:34)


Cardiac Monitor / Telemetry YANI.Q8H (2/12/18 05:34)


Cardiac Rhythm YANI.Q8H (2/12/18 05:34)


Notify Dr: Other (2/12/18 05:34)


Diltiazem Inj (Cardizem Inj) (2/12/18 05:45)


Diltiazem Inj (Cardizem Inj) (2/12/18 05:45)


Blood Culture (2/12/18 06:10)


Lactic Acid (2/12/18 06:10)


Diltiazem Inj (Cardizem Inj) (2/12/18 06:15)


Piperacil-Tazo 4.5 Gm Premix (Zosyn 4.5 (2/12/18 07:45)


Vancomycin Inj (Vancomycin Inj) (2/12/18 07:45)


Ct Pulmonary Angiogram (2/12/18 )


Admit Order (Ed Use Only) (2/12/18 08:15)





Labs





Laboratory Tests








Test


  2/12/18


05:35 2/12/18


05:38 2/12/18


06:25 2/12/18


06:46


 


White Blood Count 19.5 TH/MM3    


 


Red Blood Count 5.07 MIL/MM3    


 


Hemoglobin 11.9 GM/DL    


 


Hematocrit 38.5 %    


 


Mean Corpuscular Volume 75.8 FL    


 


Mean Corpuscular Hemoglobin 23.4 PG    


 


Mean Corpuscular Hemoglobin


Concent 30.9 % 


  


  


  


 


 


Red Cell Distribution Width 20.1 %    


 


Platelet Count 161 TH/MM3    


 


Mean Platelet Volume 10.1 FL    


 


Neutrophils (%) (Auto) 89.3 %    


 


Lymphocytes (%) (Auto) 7.6 %    


 


Monocytes (%) (Auto) 2.3 %    


 


Eosinophils (%) (Auto) 0.3 %    


 


Basophils (%) (Auto) 0.5 %    


 


Neutrophils # (Auto) 17.4 TH/MM3    


 


Lymphocytes # (Auto) 1.5 TH/MM3    


 


Monocytes # (Auto) 0.4 TH/MM3    


 


Eosinophils # (Auto) 0.1 TH/MM3    


 


Basophils # (Auto) 0.1 TH/MM3    


 


CBC Comment DIFF FINAL    


 


Differential Comment     


 


Prothrombin Time 11.3 SEC    


 


Prothromb Time International


Ratio 1.1 RATIO 


  


  


  


 


 


Blood Urea Nitrogen 35 MG/DL    


 


Creatinine 1.34 MG/DL    


 


Random Glucose 208 MG/DL    


 


Calcium Level 8.2 MG/DL    


 


Magnesium Level 2.2 MG/DL    


 


Sodium Level 143 MEQ/L    


 


Potassium Level 4.7 MEQ/L    


 


Chloride Level 106 MEQ/L    


 


Carbon Dioxide Level 26.1 MEQ/L    


 


Anion Gap 11 MEQ/L    


 


Estimat Glomerular Filtration


Rate 53 ML/MIN 


  


  


  


 


 


Troponin I 0.11 NG/ML    


 


B-Type Natriuretic Peptide 865 PG/ML    


 


Blood Gas Puncture Site  RT RADIAL   


 


Blood Gas Patient Temperature  98.6   


 


Blood Gas HCO3  25 mmol/L   


 


Blood Gas Base Excess  0.1 mmol/L   


 


Blood Gas Oxygen Saturation  98 %   


 


Arterial Blood pH  7.34   


 


Arterial Blood Partial


Pressure CO2 


  48 mmHg 


  


  


 


 


Arterial Blood Partial


Pressure O2 


  413 mmHG 


  


  


 


 


Arterial Blood Oxygen Content  16.6 Vol %   


 


Arterial Blood


Carboxyhemoglobin 


  0.7 % 


  


  


 


 


Arterial Blood Methemoglobin  1.2 %   


 


Blood Gas Hemoglobin  11.3 G/DL   


 


Oxygen Delivery Device  BIPAP   


 


Blood Gas Ventilator Setting  IPAP16/EPAP8   


 


Blood Gas Inspired Oxygen  100 %   


 


Lactic Acid Level   3.8 mmol/L  


 


Urine Color    LIGHT-YELLOW 


 


Urine Turbidity    CLEAR 


 


Urine pH    6.0 


 


Urine Specific Gravity    1.006 


 


Urine Protein    TRACE mg/dL 


 


Urine Glucose (UA)    NEG mg/dL 


 


Urine Ketones    NEG mg/dL 


 


Urine Occult Blood    NEG 


 


Urine Nitrite    NEG 


 


Urine Bilirubin    NEG 


 


Urine Urobilinogen


  


  


  


  LESS THAN 2.0


MG/DL


 


Urine Leukocyte Esterase    NEG 


 


Urine RBC    4 /hpf 


 


Urine WBC    3 /hpf 


 


Urine Squamous Epithelial


Cells 


  


  


  <1 /hpf 


 


 


Urine Hyaline Casts    1 /lpf 


 


Urine Mucus    FEW /lpf 


 


Microscopic Urinalysis Comment


  


  


  


  CULT NOT


INDICATED











MDM


Medical Decision Making


Medical Screen Exam Complete:  Yes


Emergency Medical Condition:  Yes


Medical Record Reviewed:  Yes


Interpretation(s)


Twelve-lead EKG was reviewed by me.  Atrial fibrillation, left axis deviation, 

RVR.  Heart rate of 143 bpm.


Differential Diagnosis


Flash pulmonary edema, congestive heart failure, acute coronary syndrome


Narrative Course


5:51 AM patient was put on BiPAP here at 16/8.  Blood gas which is reviewed by 

me which appears to be satisfactory.  Awaiting for the blood test results to 

come back.  I was just told by the nurse that his heart rate has come down to 1 

teens to 120s.  Patient will be getting a Cardizem bolus and possible drip.





6:12 AM blood test result shows significant leukocytosis with a leftward shift.

  I've ordered blood culture and lactic acid as well.  Chest x-ray has been 

done.  I do not see any obvious infiltrate or pleural effusion.  Awaiting for 

the radiologist to give read.  Heart rate is in 80s to 90s after a 15 mg of 

Cardizem bolus.  Patient's blood pressure had dropped.  I backed off on the 

BiPAP pressure to 12 and 6.  I have discontinued the Cardizem drip at this 

point.





7:05 AM most of the blood test results of back.  Patient's troponin is 

elevated.  This could be from the rapid A. fib and RVR.  I would like to admit 

the patient to [facility] [place].  Awaiting for the call back from the admitting physician.


Critical Care Narrative


Aggregate critical care time was 60 minutes. Time to perform other separately 

billable procedures was not  


included in the critical care time. My time did not include minutes spent 

treating any other patients simultaneously or on  


activities that did not directly contribute to the patient's treatment.  





The services I provided to this patient were to treat and/or prevent clinically 

significant deterioration that could result  


in: Respiratory distress, A. fib with RVR, elevated troponin, sepsis





I provided critical care services requiring my management, as noted below:


Chart data review, documentation time, medication orders and management, vital 

sign assessments/reviewing monitor data,  


ordering and reviewing lab tests, ordering and interpreting/reviewing x-rays 

and diagnostic studies, care of the patient  


and discussion of the patient with the admitting physicians.





Procedures


EKG Prior to Arrival:  No





Diagnosis





 Primary Impression:  


 Respiratory distress


 Additional Impressions:  


 Atrial fibrillation with RVR


 Elevated troponin I level


 Hypertensive emergency


 Flash pulmonary edema


 Sepsis


 Qualified Codes:  A41.9 - Sepsis, unspecified organism





Admitting Information


Admitting Physician Requests:  Admit


Scripts


Prednisone (21) 10 mg tab Dose Pack (Prednisone (21) 10 mg tab Dose Pack) 10 Mg 

Pack


10 MG PO AS DIRECTED for Inflammation, #1 DSPK 0 Refills


   Prov: Mirza Atkinson MD         2/14/18 


[Budeson-Formot 160-4.5 Mcg Inh] 60 PUFF AERO No Conflict Check


2 PUFF INH Q12HR, #1 INH


   Prov: Mirza Atkinson MD         2/14/18 


Diltiazem CD 24 HR (Cardizem CD 24 HR) 180 Mg Caper


180 MG PO DAILY, #30 CAP


   Prov: Mirza Atkinson MD         2/14/18 


Levofloxacin (Levaquin) 750 Mg Tablet


750 MG PO Q24H, #5 TAB


   Prov: Mirza Atkinson MD         2/14/18 


Metoprolol Tartrate (Metoprolol Tartrate) 25 Mg Tab


25 MG PO BID, #60 TAB 0 Refills


   Prov: Mirza Atkinson MD         2/14/18 


Rivaroxaban (Xarelto) 10 Mg Tab


10 MG PO BID for Blood Clot Prevention, #30 TAB 0 Refills


   Prov: Mirza Atkinson MD         2/14/18











Gaudencio Marti MD Feb 12, 2018 05:51

## 2018-02-12 NOTE — MH
cc:

ROSALINA LANDIS

****

 

DATE OF ADMISSION:  2018

 

 1950

 

HISTORY OF PRESENT ILLNESS

The patient is 67-year-old male with past medical history of pulmonary embolism

and DVT left lower extremity in  on Xarelto, COPD, anxiety disorder.  The

patient presented to [facility] ED with three to four day history

of respiratory distress. On arrival to the ER the patient was hypertensive with

systolic blood pressure in 180s and his EKG showed atrial fibrillation with

rapid ventricular response at a rate of 143 beats per minute.  He was given

Cardizem 15 mg IV push. In addition he was given Lasix 40 mg by paramedics.

The patient was initially placed on a BiPAP 16/8 with 100% FIO2 and his ABG

showed a pH of 7.34, CO2 48, pAO2 413, bicarb 25 and sats of 98%.  He was

weaned off BiPAP and is currently on 4 liters oxygen with good saturation.  His

laboratory data was significant for leukocytosis with a WBC of 19.5, however,

the patient is afebrile.  His lactic acid measured at 3.8 and .  A chest

x-ray in the ED showed left basilar atelectasis.  Subsequently the patient

underwent CT angiogram of the chest which showed no evidence of any central

pulmonary emboli.  However, minimal consolidative changes noted in the right

lung base.  He received vancomycin and Zosyn in the ER.  He denies any

associated symptoms of chest pain, cough or any constitutional symptoms.

Furthermore, the patient denies any orthopnea, PND or edema of lower

extremities.  He denies any use of oxygen at home.  The patient states that he

has a nebulizer at home, however, it is broken.  He quit smoking a month ago

and has a 40 pack-year history of smoking.  The patient denies any history of

coronary artery disease.

 

PAST MEDICAL HISTORY

Past medical history significant for:

1.  Pulmonary embolism.

2.  DVT.

3.  COPD.

4.  Irregular heartbeat.

5.  Anxiety disorder.

6.  Depression.

 

 

PAST SURGICAL HISTORY

1.  Previous appendectomy.

2.  Previous tonsillectomy.

3.  Hernia repair.

 

SOCIAL HISTORY

The patient has quit smoking a month ago.  Has a 40 pack-year history of

smoking.  Also he has history of ETOH use.

 

ALLERGIES

NO KNOWN DRUG ALLERGIES.

 

FAMILY HISTORY

Noncontributory to current present illness.

 

MEDICATION

Reported medications:

1.  Omeprazole.

2.  Medrol Dosepak.

3.  Lasix.

4.  Xarelto.

5.  Albuterol.

 

REVIEW OF SYSTEMS

As per HPI.  The rest of review of systems unremarkable.

 

PHYSICAL EXAMINATION

GENERAL: A 67-year-old male lying in bed, in mild respiratory distress.

VITAL SIGNS: Temperature 99.5, pulse of 105, saturation 95% on 4 liters,

respiratory rate of 18.

HEENT:  Atraumatic, normocephalic. Pupil equal, round, reactive to light and

accommodation. Extraocular muscles intact.  Conjunctivae pink.  Nonicteric

sclerae.  Oral mucosa within normal.

NECK: Supple.  No JVD, adenopathy, thyromegaly.  Trachea midline.

CARDIOVASCULAR: Tachycardic, irregular.  Normal S1-S2.  No murmurs, rubs or

gallops noted.

PULMONARY: Bilateral equal air entry.  No crackles.

ABDOMEN: Soft, nontender, no distension.  Positive bowel sounds.  EXTREMITIES:

No cyanosis, clubbing or edema.

NEURO: No focal sensory deficit.

 

LABORATORY DATA

Sodium 143, potassium 4.7, chloride 106, CO2 26, BUN 35, creatinine 1.34,

glucose 208, lactic acid 3.8, troponin 0.11, , WBC 19.5, hemoglobin

11.9, hematocrit 38, platelet count of 161, INR 1.1, PT 11.3.

 

EKG

Showed atrial fibrillation with RVR.

 

RADIOGRAPHIC STUDIES

CT angiogram of the chest negative for central pulmonary emboli, minimal

consolidative changes noted right lung base.

 

IMPRESSION

1.   Acute hypercapnic respiratory failure.

2.   COPD exacerbation.

3.   CHF with elevated BNP on admission.

4.   Hypertension.

5.   Lactic acidemia.

6.   Mild acute kidney injury.

7.   Hyperglycemia.

8.   Leukocytosis.

9.   History of PE and DVT.

10.  History of anxiety.

 

RECOMMENDATIONS

1.   Monitor neuro status closely and avoid sedatives.

2.   Continue oxygen and maintain sats above 92%.

3.   Bronchodilators in the form of DuoNeb q. 4 plus q. 2 p.r.n. for shortness

of breath. In addition will start on Symbicort two puffs q. 12-hour.

4.   Noninvasive positive pressure ventilation p.r.n. for respiratory distress.

 

5.   Will consult pulmonary service.  The patient will need pulmonary function

test as an outpatient to assess the severity of his obstructive lung disease.

6.   Monitor heart rate and blood pressure closely and maintain MAP greater

than 65 mmHg.  Will monitor troponins and will obtain 2-D echo to evaluate LV

function and to rule out regional wall motion abnormalities.

7.   Diurese with Lasix as needed.  Continue with Xarelto 10 mg b.i.d.

8.   Serial lactic acid monitoring until clear.

9.   Monitor renal function, Is and Os and avoid nephrotoxins.  Electrolyte

replacement as needed.

10.  Place on heart healthy diet and Pepcid for GI prophylaxis.

11.  Place on empiric antibiotics in the form of Levaquin and monitor for signs

of infections which include fever and WBC.  Will obtain a sputum culture with

gram stain.  Follow up on blood cultures.  His urinalysis is negative for

leukocyte esterase and nitrite, 3 WBC noted.

12.  Place on sliding scale insulin with Accu-Cheks for glycemic control.

13.  Monitor CBC.

14.  GI prophylaxis with Pepcid and DVT prophylaxis with SCDs and will continue

with Xarelto 10 mg b.i.d.

15.  Further recommendations will be based on hospital course.

 

 

 

                               __________________________________

                           MD EFE Cotter/ADRI

D:  2018/10:30 AM

T:  2018/10:46 AM

Visit #:  [id number]

Job #:  [id number]

## 2018-02-12 NOTE — RADRPT
EXAM DATE/TIME:  02/12/2018 09:10 

 

[facility] COMPARISON:     

CT PULMONARY ANGIOGRAM, October 11, 2016, 8:19.

 

 

INDICATIONS :     

Shortness of breath, rule out pulmonary embolism. 

                    

 

IV CONTRAST:     

74 cc Omnipaque 350 (iohexol) IV 

 

 

RADIATION DOSE:     

14.75 CTDIvol (mGy) 

 

 

MEDICAL HISTORY :     

Cardiovascular disease. Hypertension. Chronic obstructive pulmonary disease.Pulmonary Embolism

 

SURGICAL HISTORY :      

Appendectomy. 

 

ENCOUNTER:      

Initial

 

ACUITY:      

1 day

 

PAIN SCALE:      

0/10

 

LOCATION:        

chest 

 

TECHNIQUE:     

Volumetric scanning of the chest was performed using a pulmonary embolism protocol MIP images were re
constructed.  Using automated exposure control and adjustment of the mA and/or kV according to patien
t size, radiation dose was kept as low as reasonably achievable to obtain optimal diagnostic quality 
images.   DICOM format image data is available electronically for review and comparison.  

 

Follow-up recommendations for detected pulmonary nodules are based at a minimum on nodule size and pa
tient risk factors according to Fleischner Society Guidelines.

 

FINDINGS:     

.  Minimal consolidative changes are present anteriorly in the right lung.

On there is no axillary or mediastinal adenopathy.  There is no evidence of central pulmonary emboli

Moderate coronary calcifications are noted

Extensive sclerotic changes are seen about both glenoid.  Slight changes are seen in numerous vertebr
al bodies these are nonspecific but could represent neoplastic process in appropriate to the situatio
n.  Correlation is suggested.

 

CONCLUSION:     

Minimal consolidative changes right lung

Negative for central pulmonary emboli

Abnormal axial skeleton unchanged from 10/11/16.

 

 

 

 

 Montana Butcher MD FACR on February 12, 2018 at 9:20           

Board Certified Radiologist.

 This report was verified electronically.

## 2018-02-12 NOTE — PD
Data


Data


Last Documented VS





Vital Signs








  Date Time  Temp Pulse Resp B/P (MAP) Pulse Ox O2 Delivery O2 Flow Rate FiO2


 


2/12/18 07:50  85  100/58 (72) 94 BiPAP  50


 


2/12/18 06:28 99.5  18     








Orders





 Orders


Complete Blood Count With Diff (2/12/18 05:32)


Basic Metabolic Panel (Bmp) (2/12/18 05:32)


B-Type Natriuretic Peptide (2/12/18 05:32)


Prothrombin Time / Inr (Pt) (2/12/18 05:32)


Magnesium (Mg) (2/12/18 05:32)


Troponin I (2/12/18 05:32)


Arterial Blood Gas (Abg) (2/12/18 05:32)


Urinalysis - C+S If Indicated (2/12/18 05:32)


Iv Access Insert/Monitor (2/12/18 05:32)


Electrocardiogram (2/12/18 05:32)


Ecg Monitoring (2/12/18 05:32)


Oximetry (2/12/18 05:32)


Oxygen Administration (2/12/18 05:32)


Chest, Single Ap (2/12/18 05:32)


Sodium Chloride 0.9% Flush (Ns Flush) (2/12/18 05:45)


Resp Bipap / Cpap Non Invas Vt (2/12/18 05:32)


Vital Signs (Adult) Q15MX4,Q4H (2/12/18 05:34)


Cardiac Monitor / Telemetry YANI.Q8H (2/12/18 05:34)


Cardiac Rhythm YANI.Q8H (2/12/18 05:34)


Notify Dr: Other (2/12/18 05:34)


Diltiazem Inj (Cardizem Inj) (2/12/18 05:45)


Diltiazem Inj (Cardizem Inj) (2/12/18 05:45)


Blood Culture (2/12/18 06:10)


Lactic Acid (2/12/18 06:10)


Diltiazem Inj (Cardizem Inj) (2/12/18 06:15)


Piperacil-Tazo 4.5 Gm Premix (Zosyn 4.5 (2/12/18 07:45)


Vancomycin Inj (Vancomycin Inj) (2/12/18 07:45)


Ct Pulmonary Angiogram (2/12/18 )





Labs





Laboratory Tests








Test


  2/12/18


05:35 2/12/18


05:38 2/12/18


06:25 2/12/18


06:46


 


White Blood Count 19.5 TH/MM3    


 


Red Blood Count 5.07 MIL/MM3    


 


Hemoglobin 11.9 GM/DL    


 


Hematocrit 38.5 %    


 


Mean Corpuscular Volume 75.8 FL    


 


Mean Corpuscular Hemoglobin 23.4 PG    


 


Mean Corpuscular Hemoglobin


Concent 30.9 % 


  


  


  


 


 


Red Cell Distribution Width 20.1 %    


 


Platelet Count 161 TH/MM3    


 


Mean Platelet Volume 10.1 FL    


 


Neutrophils (%) (Auto) 89.3 %    


 


Lymphocytes (%) (Auto) 7.6 %    


 


Monocytes (%) (Auto) 2.3 %    


 


Eosinophils (%) (Auto) 0.3 %    


 


Basophils (%) (Auto) 0.5 %    


 


Neutrophils # (Auto) 17.4 TH/MM3    


 


Lymphocytes # (Auto) 1.5 TH/MM3    


 


Monocytes # (Auto) 0.4 TH/MM3    


 


Eosinophils # (Auto) 0.1 TH/MM3    


 


Basophils # (Auto) 0.1 TH/MM3    


 


CBC Comment DIFF FINAL    


 


Differential Comment     


 


Prothrombin Time 11.3 SEC    


 


Prothromb Time International


Ratio 1.1 RATIO 


  


  


  


 


 


Blood Urea Nitrogen 35 MG/DL    


 


Creatinine 1.34 MG/DL    


 


Random Glucose 208 MG/DL    


 


Calcium Level 8.2 MG/DL    


 


Magnesium Level 2.2 MG/DL    


 


Sodium Level 143 MEQ/L    


 


Potassium Level 4.7 MEQ/L    


 


Chloride Level 106 MEQ/L    


 


Carbon Dioxide Level 26.1 MEQ/L    


 


Anion Gap 11 MEQ/L    


 


Estimat Glomerular Filtration


Rate 53 ML/MIN 


  


  


  


 


 


Troponin I 0.11 NG/ML    


 


B-Type Natriuretic Peptide 865 PG/ML    


 


Blood Gas Puncture Site  RT RADIAL   


 


Blood Gas Patient Temperature  98.6   


 


Blood Gas HCO3  25 mmol/L   


 


Blood Gas Base Excess  0.1 mmol/L   


 


Blood Gas Oxygen Saturation  98 %   


 


Arterial Blood pH  7.34   


 


Arterial Blood Partial


Pressure CO2 


  48 mmHg 


  


  


 


 


Arterial Blood Partial


Pressure O2 


  413 mmHG 


  


  


 


 


Arterial Blood Oxygen Content  16.6 Vol %   


 


Arterial Blood


Carboxyhemoglobin 


  0.7 % 


  


  


 


 


Arterial Blood Methemoglobin  1.2 %   


 


Blood Gas Hemoglobin  11.3 G/DL   


 


Oxygen Delivery Device  BIPAP   


 


Blood Gas Ventilator Setting  IPAP16/EPAP8   


 


Blood Gas Inspired Oxygen  100 %   


 


Lactic Acid Level   3.8 mmol/L  


 


Urine Color    LIGHT-YELLOW 


 


Urine Turbidity    CLEAR 


 


Urine pH    6.0 


 


Urine Specific Gravity    1.006 


 


Urine Protein    TRACE mg/dL 


 


Urine Glucose (UA)    NEG mg/dL 


 


Urine Ketones    NEG mg/dL 


 


Urine Occult Blood    NEG 


 


Urine Nitrite    NEG 


 


Urine Bilirubin    NEG 


 


Urine Urobilinogen


  


  


  


  LESS THAN 2.0


MG/DL


 


Urine Leukocyte Esterase    NEG 


 


Urine RBC    4 /hpf 


 


Urine WBC    3 /hpf 


 


Urine Squamous Epithelial


Cells 


  


  


  <1 /hpf 


 


 


Urine Hyaline Casts    1 /lpf 


 


Urine Mucus    FEW /lpf 


 


Microscopic Urinalysis Comment


  


  


  


  CULT NOT


INDICATED











MDM


Supervised Visit with DIDIER:  No


Narrative Course


I have evaluated this patient at 8 AM.





I resumed care of this patient so he could get admitted into the hospital.  


Patient currently is on BiPAP with a saturation of 94%


He is in rapid A. fib with a rate of 120 and a systolic blood pressure of 100


He has low-grade temp of 99.5 and elevated lactate and leukocytosis with 

marginal vital signs on BiPAP and I believe intensive care is the proper place 

for him at this time.





I have discussed with the intensivist in detail.  He is recommended CT 

pulmonary angiogram to rule out PE given his lack of chest x-ray findings 

explaining his shortness of breath and poor saturations


There is no pulmonary edema or pneumonia on chest x-ray


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 

be full


Patient has received IV antibiotics





I admitted him to intensive care after discussion with Dr. Norton and I 

ordered CT pulmonary angiogram


Diagnosis





 Primary Impression:  


 Respiratory distress


 Additional Impressions:  


 Atrial fibrillation with RVR


 Elevated troponin I level


 Hypertensive emergency


 Flash pulmonary edema


 Sepsis


 Qualified Codes:  A41.9 - Sepsis, unspecified organism





Admitting Information


Admitting Physician Requests:  Admit











Roshan Chacko MD Feb 12, 2018 08:15

## 2018-02-12 NOTE — RADRPT
EXAM DATE/TIME:  02/12/2018 05:51 

 

[facility] COMPARISON:     

CHEST SINGLE AP, April 08, 2017, 4:56.

 

                     

INDICATIONS :     

Short of breath.

                     

 

MEDICAL HISTORY :            

Hypertension. Chronic obstructive pulmonary disease. Deep venous thrombosis.   

 

SURGICAL HISTORY :     

Appendectomy.   

 

ENCOUNTER:     

Initial                                        

 

ACUITY:     

1 day      

 

PAIN SCORE:     

0/10

 

LOCATION:     

Bilateral  chest

 

FINDINGS:     

There is atherosclerotic calcification of the aorta, possible tiny effusion with blunting of the left
 lateral costophrenic angle and mild left basilar atelectasis. Aortic calcification.

 

CONCLUSION:     

Left basilar atelectasis. Possible tiny left effusion.

 

 

 

 Randal Calix MD on February 12, 2018 at 6:18           

Board Certified Radiologist.

 This report was verified electronically.

## 2018-02-12 NOTE — EKG
Date Performed: 02/12/2018       Time Performed: 05:31:47

 

PTAGE:      67 years

 

EKG:      ATRIAL FIBRILLATION WITH RAPID VENTRICULAR RESPONSE NONSPECIFIC ST & T-WAVE ABNORMALITY Com
pared to previous tracing atrial fibrillation with rapid v. response has replaced ectopic rhythm, ST 
segment depression and T wave inversion is now noted in the lateral precordial leads. ABNORMAL ECG

 

PREVIOUS TRACING       : 4/8/17

 

DOCTOR:   Leif Heath  Interpretating Date/Time  02/12/2018 11:57:45

## 2018-02-13 VITALS
OXYGEN SATURATION: 80 % | SYSTOLIC BLOOD PRESSURE: 126 MMHG | DIASTOLIC BLOOD PRESSURE: 58 MMHG | RESPIRATION RATE: 47 BRPM | TEMPERATURE: 97.7 F | HEART RATE: 51 BPM

## 2018-02-13 VITALS
DIASTOLIC BLOOD PRESSURE: 79 MMHG | TEMPERATURE: 97.8 F | SYSTOLIC BLOOD PRESSURE: 130 MMHG | HEART RATE: 85 BPM | OXYGEN SATURATION: 100 % | RESPIRATION RATE: 14 BRPM

## 2018-02-13 VITALS
RESPIRATION RATE: 16 BRPM | TEMPERATURE: 98 F | DIASTOLIC BLOOD PRESSURE: 56 MMHG | HEART RATE: 53 BPM | OXYGEN SATURATION: 91 % | SYSTOLIC BLOOD PRESSURE: 93 MMHG

## 2018-02-13 VITALS
DIASTOLIC BLOOD PRESSURE: 83 MMHG | SYSTOLIC BLOOD PRESSURE: 131 MMHG | TEMPERATURE: 98.3 F | OXYGEN SATURATION: 100 % | HEART RATE: 51 BPM | RESPIRATION RATE: 17 BRPM

## 2018-02-13 VITALS
TEMPERATURE: 98.2 F | OXYGEN SATURATION: 100 % | HEART RATE: 61 BPM | RESPIRATION RATE: 12 BRPM | SYSTOLIC BLOOD PRESSURE: 119 MMHG | DIASTOLIC BLOOD PRESSURE: 69 MMHG

## 2018-02-13 VITALS
DIASTOLIC BLOOD PRESSURE: 74 MMHG | HEART RATE: 62 BPM | TEMPERATURE: 98 F | RESPIRATION RATE: 22 BRPM | SYSTOLIC BLOOD PRESSURE: 128 MMHG | OXYGEN SATURATION: 95 %

## 2018-02-13 VITALS — OXYGEN SATURATION: 97 %

## 2018-02-13 VITALS — HEART RATE: 64 BPM

## 2018-02-13 VITALS — OXYGEN SATURATION: 92 %

## 2018-02-13 VITALS — HEART RATE: 66 BPM

## 2018-02-13 LAB
BASOPHILS # BLD AUTO: 0 TH/MM3 (ref 0–0.2)
BASOPHILS NFR BLD: 0.1 % (ref 0–2)
BUN SERPL-MCNC: 40 MG/DL (ref 7–18)
CALCIUM SERPL-MCNC: 8.3 MG/DL (ref 8.5–10.1)
CHLORIDE SERPL-SCNC: 102 MEQ/L (ref 98–107)
CREAT SERPL-MCNC: 1.29 MG/DL (ref 0.6–1.3)
EOSINOPHIL # BLD: 0 TH/MM3 (ref 0–0.4)
EOSINOPHIL NFR BLD: 0 % (ref 0–4)
ERYTHROCYTE [DISTWIDTH] IN BLOOD BY AUTOMATED COUNT: 19.4 % (ref 11.6–17.2)
GFR SERPLBLD BASED ON 1.73 SQ M-ARVRAT: 55 ML/MIN (ref 89–?)
GLUCOSE SERPL-MCNC: 85 MG/DL (ref 74–106)
HCO3 BLD-SCNC: 31.5 MEQ/L (ref 21–32)
HCT VFR BLD CALC: 38.4 % (ref 39–51)
HGB BLD-MCNC: 12 GM/DL (ref 13–17)
LYMPHOCYTES # BLD AUTO: 0.6 TH/MM3 (ref 1–4.8)
LYMPHOCYTES NFR BLD AUTO: 4.8 % (ref 9–44)
MAGNESIUM SERPL-MCNC: 1.9 MG/DL (ref 1.5–2.5)
MCH RBC QN AUTO: 23.5 PG (ref 27–34)
MCHC RBC AUTO-ENTMCNC: 31.2 % (ref 32–36)
MCV RBC AUTO: 75.4 FL (ref 80–100)
MONOCYTE #: 0.2 TH/MM3 (ref 0–0.9)
MONOCYTES NFR BLD: 2 % (ref 0–8)
NEUTROPHILS # BLD AUTO: 10.8 TH/MM3 (ref 1.8–7.7)
NEUTROPHILS NFR BLD AUTO: 93.1 % (ref 16–70)
PHOSPHATE SERPL-MCNC: 4 MG/DL (ref 2.5–4.9)
PLATELET # BLD: 130 TH/MM3 (ref 150–450)
PMV BLD AUTO: 10.4 FL (ref 7–11)
RBC # BLD AUTO: 5.1 MIL/MM3 (ref 4.5–5.9)
SODIUM SERPL-SCNC: 143 MEQ/L (ref 136–145)
WBC # BLD AUTO: 11.6 TH/MM3 (ref 4–11)

## 2018-02-13 RX ADMIN — METHYLPREDNISOLONE SODIUM SUCCINATE SCH MG: 40 INJECTION, POWDER, FOR SOLUTION INTRAMUSCULAR; INTRAVENOUS at 11:20

## 2018-02-13 RX ADMIN — DILTIAZEM HYDROCHLORIDE SCH MG: 60 TABLET, FILM COATED ORAL at 17:53

## 2018-02-13 RX ADMIN — IPRATROPIUM BROMIDE AND ALBUTEROL SULFATE SCH AMPULE: .5; 3 SOLUTION RESPIRATORY (INHALATION) at 11:32

## 2018-02-13 RX ADMIN — HUMAN INSULIN SCH: 100 INJECTION, SOLUTION SUBCUTANEOUS at 05:51

## 2018-02-13 RX ADMIN — RIVAROXABAN SCH MG: 10 TABLET, FILM COATED ORAL at 08:57

## 2018-02-13 RX ADMIN — IPRATROPIUM BROMIDE AND ALBUTEROL SULFATE SCH AMPULE: .5; 3 SOLUTION RESPIRATORY (INHALATION) at 00:38

## 2018-02-13 RX ADMIN — FAMOTIDINE SCH MG: 10 INJECTION, SOLUTION INTRAVENOUS at 08:56

## 2018-02-13 RX ADMIN — DILTIAZEM HYDROCHLORIDE SCH MG: 60 TABLET, FILM COATED ORAL at 11:20

## 2018-02-13 RX ADMIN — DILTIAZEM HYDROCHLORIDE SCH MG: 60 TABLET, FILM COATED ORAL at 23:31

## 2018-02-13 RX ADMIN — CALCIUM CARBONATE (ANTACID) CHEW TAB 500 MG PRN MG: 500 CHEW TAB at 20:31

## 2018-02-13 RX ADMIN — IPRATROPIUM BROMIDE AND ALBUTEROL SULFATE SCH AMPULE: .5; 3 SOLUTION RESPIRATORY (INHALATION) at 03:50

## 2018-02-13 RX ADMIN — STANDARDIZED SENNA CONCENTRATE AND DOCUSATE SODIUM SCH TAB: 8.6; 5 TABLET, FILM COATED ORAL at 20:24

## 2018-02-13 RX ADMIN — BUDESONIDE AND FORMOTEROL FUMARATE DIHYDRATE SCH PUFF: 160; 4.5 AEROSOL RESPIRATORY (INHALATION) at 21:07

## 2018-02-13 RX ADMIN — CALCIUM CARBONATE (ANTACID) CHEW TAB 500 MG PRN MG: 500 CHEW TAB at 17:53

## 2018-02-13 RX ADMIN — HUMAN INSULIN SCH: 100 INJECTION, SOLUTION SUBCUTANEOUS at 11:22

## 2018-02-13 RX ADMIN — HUMAN INSULIN SCH: 100 INJECTION, SOLUTION SUBCUTANEOUS at 23:31

## 2018-02-13 RX ADMIN — METHYLPREDNISOLONE SODIUM SUCCINATE SCH MG: 40 INJECTION, POWDER, FOR SOLUTION INTRAMUSCULAR; INTRAVENOUS at 02:59

## 2018-02-13 RX ADMIN — STANDARDIZED SENNA CONCENTRATE AND DOCUSATE SODIUM SCH TAB: 8.6; 5 TABLET, FILM COATED ORAL at 08:57

## 2018-02-13 RX ADMIN — METOPROLOL TARTRATE SCH MG: 25 TABLET, FILM COATED ORAL at 08:56

## 2018-02-13 RX ADMIN — IPRATROPIUM BROMIDE AND ALBUTEROL SULFATE SCH AMPULE: .5; 3 SOLUTION RESPIRATORY (INHALATION) at 16:53

## 2018-02-13 RX ADMIN — METHYLPREDNISOLONE SODIUM SUCCINATE SCH MG: 40 INJECTION, POWDER, FOR SOLUTION INTRAMUSCULAR; INTRAVENOUS at 17:54

## 2018-02-13 RX ADMIN — DILTIAZEM HYDROCHLORIDE SCH MG: 60 TABLET, FILM COATED ORAL at 05:51

## 2018-02-13 RX ADMIN — HUMAN INSULIN SCH: 100 INJECTION, SOLUTION SUBCUTANEOUS at 17:54

## 2018-02-13 RX ADMIN — LEVOFLOXACIN SCH MG: 750 TABLET, FILM COATED ORAL at 11:20

## 2018-02-13 RX ADMIN — IPRATROPIUM BROMIDE AND ALBUTEROL SULFATE SCH AMPULE: .5; 3 SOLUTION RESPIRATORY (INHALATION) at 20:08

## 2018-02-13 RX ADMIN — RIVAROXABAN SCH MG: 10 TABLET, FILM COATED ORAL at 20:24

## 2018-02-13 RX ADMIN — METOPROLOL TARTRATE SCH MG: 25 TABLET, FILM COATED ORAL at 20:24

## 2018-02-13 RX ADMIN — IPRATROPIUM BROMIDE AND ALBUTEROL SULFATE SCH AMPULE: .5; 3 SOLUTION RESPIRATORY (INHALATION) at 07:40

## 2018-02-13 RX ADMIN — BUDESONIDE AND FORMOTEROL FUMARATE DIHYDRATE SCH PUFF: 160; 4.5 AEROSOL RESPIRATORY (INHALATION) at 08:57

## 2018-02-13 RX ADMIN — IPRATROPIUM BROMIDE AND ALBUTEROL SULFATE SCH AMPULE: .5; 3 SOLUTION RESPIRATORY (INHALATION) at 23:22

## 2018-02-13 RX ADMIN — FAMOTIDINE SCH MG: 10 INJECTION, SOLUTION INTRAVENOUS at 20:25

## 2018-02-13 NOTE — HHI.PR
Subjective


Remarks


Patient reports he is feeling slightly better today.  Shortness of breath has 

improved. Heart rate is controlled.


He complains of anxiety today.  States he used to take a benzodiazepine for 

anxiety.  He believes that alcohol helps with his anxiety.





Objective


Vitals





Vital Signs








  Date Time  Temp Pulse Resp B/P (MAP) Pulse Ox O2 Delivery O2 Flow Rate FiO2


 


2/13/18 07:41     97 Nasal Cannula 4.00 


 


2/13/18 06:00  66      


 


2/13/18 04:00 98.0 60 16 93/56 (68) 91   


 


2/13/18 04:00  53      


 


2/13/18 02:00  64      


 


2/13/18 00:00  75      


 


2/13/18 00:00 97.8 85 14 130/79 (96) 100   


 


2/12/18 22:00  80      


 


2/12/18 20:00  95      


 


2/12/18 20:00 97.8 95 35 103/77 (86) 100   


 


2/12/18 19:44     100 Nasal Cannula 4.00 


 


2/12/18 19:00     100 Nasal Cannula 4.00 


 


2/12/18 18:00  122      


 


2/12/18 16:00  108      


 


2/12/18 16:00 98.3 108 15 126/87 (100) 100   


 


2/12/18 14:00  153      


 


2/12/18 12:00 98.5 105 37 124/82 (96) 88   


 


2/12/18 12:00  105      


 


2/12/18 11:00 98.4 89 22 133/76 (95) 97   


 


2/12/18 10:43     94 Nasal Cannula 4.00 


 


2/12/18 10:30        


 


2/12/18 09:35     100 Nasal Cannula 4.00 














I/O      


 


 2/12/18 2/12/18 2/12/18 2/13/18 2/13/18 2/13/18





 07:00 15:00 23:00 07:00 15:00 23:00


 


Intake Total  100 ml 730 ml 240 ml  


 


Output Total  625 ml 1100 ml 450 ml  


 


Balance  -525 ml -370 ml -210 ml  


 


      


 


Intake Oral   480 ml 240 ml  


 


IV Total  100 ml 250 ml   


 


Output Urine Total  625 ml 1100 ml 450 ml  


 


# Voids  1    


 


# Bowel Movements    0  








Result Diagram:  


2/13/18 0336                                                                   

             2/13/18 0336





Imaging





Last Impressions








Chest X-Ray 2/12/18 0532 Signed





Impressions: 





 Service Date/Time:  Monday, February 12, 2018 05:51 - CONCLUSION:  Left 

basilar 





 atelectasis. Possible tiny left effusion.     Randal Calix MD 


 


CT Angiography 2/12/18 0000 Signed





Impressions: 





 Service Date/Time:  Monday, February 12, 2018 09:10 - CONCLUSION:  Minimal 





 consolidative changes right lung Negative for central pulmonary emboli 

Abnormal 





 axial skeleton unchanged from 10/11/16.      Montana Butcher MD  FACR








Objective Remarks


GENERAL: This is a well-nourished, well-developed patient, in no apparent 

distress.


CARDIOVASCULAR: Normal rate and regular rhythm without murmurs, gallops, or 

rubs. 


RESPIRATORY: Good respiratory efforts.  Diminished breath sounds at the bases 

bilaterally otherwise clear to auscultation.


GASTROINTESTINAL: Abdomen soft, non-tender, non-distended. Normal active bowel 

sounds


MUSCULOSKELETAL: Extremities without cyanosis, or edema.


NEURO:  Alert & Oriented x4 to person, place, time, situation.  Moves all ext x4


PSYCH: Appropriate mood and affect.





A/P


Assessment and Plan


67-year-old male admitted with acute hypercapnic respiratory failure secondary 

to COPD exacerbation, A. fib with aVR





Acute respiratory failure secondary to COPD exacerbation:


- Continue supplemental oxygen, IV Solu-Medrol today.  Plan to transition to 

oral steroids tomorrow.


- Breathing treatments.  Continue oral Levaquin.  Plan to DC home on a short 

course.


- Patient strongly counseled to quit smoking.





A. fib with aVR:


- Appreciate cardiology following.


- Diltiazem added.  Continue metoprolol and Xarelto.





Alcohol abuse:


- Patient counseled on cessation.


- [facility] protocol





Anxiety: Likely related to alcohol dependence.


- Patient counseled alcohol or benzodiazepines are not the right medications 

for generalized anxiety.  He is advised to follow-up outpatient with PCP or 

psychiatry to consider long-term medications for treatment of anxiety such as 

SSRI.  He is not interested in starting this medication at this time.





History of PE and DVT:


-Continue Xarelto





Elevated BNP: No overt heart failure.  LVEF preserved on 2D echocardiogram.  

Hold off on diuretics.





GI prophylaxis: Famotidine.  Stool softener PRN constipation. 





DVT PPx: On Xarelto


Discharge Planning


Transfer to floor today.  Possible discharge tomorrow if he continues to 

improve.











Mirza Atkinson MD Feb 13, 2018 08:27

## 2018-02-13 NOTE — HHI.DCPOC
Discharge Care Plan


Diagnosis:  


(1) Elevated brain natriuretic peptide (BNP) level


(2) COPD with exacerbation


(3) Atrial fibrillation with RVR


(4) Alcohol abuse


Goals to Promote Your Health


* To prevent worsening of your condition and complications


* To maintain your health at the optimal level


Directions to Meet Your Goals


*** Take your medications as prescribed


*** Follow your dietary instruction


*** Follow activity as directed








*** Keep your appointments as scheduled


*** Take your immunizations and boosters as scheduled


*** If your symptoms worsen call your PCP, if no PCP go to Urgent Care Center 

or Emergency Room***


*** Smoking is Dangerous to Your Health. Avoid second hand smoke***


***Call the 24-hour hour crisis hotline for domestic abuse at [phone]***











Mirza Atkinson MD Feb 13, 2018 16:17

## 2018-02-13 NOTE — ECHRPT
Indication:   CHF

 

 CONCLUSIONS

 Normal left ventricular size with upper normal wall thickness. The left ventricular systolic functio
n is normal 

 with an estimated ejection fraction in the range of 60-65%.  Normal wall motion.

 

 Trileaflet aortic valve. 

 Mild aortic valve sclerosis is present. 

 Trace aortic valve regurgitation. 

 

 Structurally normal tricuspid valve. 

 There is trace tricuspid valve regurgitation. 

 The estimated pulmonary arterial pressure is 31 mmHg. 

 

 Structurally normal mitral valve. Trace mitral valve regurgitation. 

 

 

 BP:  100   / 58      HR: 85                       Rhythm:           Sinus

 

 MEASUREMENTS  (Male / Female) Normal Values       Technical Quality:Good

 2D ECHO

 LV Diastolic Diameter PLAX        4.1 cm                4.2 - 5.9 / 3.9 - 5.3 cm

 LV Systolic Diameter PLAX         2.7 cm                

 IVS Diastolic Thickness           1.2 cm                0.6 - 1.0 / 0.6 - 0.9 cm

 LVPW Diastolic Thickness          1.2 cm                0.6 - 1.0 / 0.6 - 0.9 cm

 LV Relative Wall Thickness        0.6                   

 RV Internal Dim ED PLAX           2.8 cm                

 LVOT Diameter                     1.8 cm                

 LA Systolic Diameter LX           4.1 cm                3.0 - 4.0 / 2.7 - 3.8 cm

 LV Ejection Fraction MOD 4C       69.9 %                

 LV Cardiac Index MOD 4C           3058.6 cm/minm     

 LV Ejection Fraction 4C AL        71.5 %                

 LV Cardiac Index 4C AL            3262.2 cm/minm     

 

 M-MODE

 Aortic Root Diameter MM           3.1 cm                

 LA Systolic Diameter MM           3.8 cm                

 LA Ao Ratio MM                    1.2                   

 

 DOPPLER

 AV Peak Velocity                  191.0 cm/s            

 AV Peak Gradient                  14.6 mmHg             

 AI Peak Velocity                  261.0 cm/s            

 AI Peak Gradient                  27.2 mmHg             

 AI Pressure Half Time             1157.0 ms             

 LVOT Peak Velocity                87.9 cm/s             

 LVOT Peak Gradient                3.1 mmHg              

 AV Area Cont Eq pk                1.2 cm               

 MV Area PHT                       4.2 cm               

 LV E' Lateral Velocity            9.4 cm/s              

 LV E' Septal Velocity             6.7 cm/s              

 TR Peak Velocity                  229.0 cm/s            

 TR Peak Gradient                  21.0 mmHg             

 Right Atrial Pressure             10.0 mmHg             

 Pulmonary Artery Systolic Pressu  31.0 mmHg             

 Right Ventricular Systolic Press  31.0 mmHg             

 PV Peak Velocity                  70.6 cm/s             

 PV Peak Gradient                  2.0 mmHg              

 

 

 FINDINGS

 

 LEFT VENTRICLE

 Normal left ventricular size with upper normal wall thickness. The left ventricular systolic functio
n is normal 

 with an estimated ejection fraction in the range of 60-65%.  Normal wall motion.

 

 RIGHT VENTRICLE

 Normal right ventricular size and systolic function.  

 

 LEFT ATRIUM

 The left atrial size is normal.  

 

 RIGHT ATRIUM

 The right atrial size is normal.  

 

 ATRIAL SEPTUM

 Normal atrial septal thickness without atrial level shunting by limited color doppler interrogation.
  

 

 AORTA

 The aortic root and proximal ascending aorta are normal in size on limited imaging.  

 

 MITRAL VALVE

 Structurally normal mitral valve. Trace mitral valve regurgitation. 

 

 

 AORTIC VALVE

 Trileaflet aortic valve. 

 Mild aortic valve sclerosis is present. 

 Trace aortic valve regurgitation. 

 

 TRICUSPID VALVE

 Structurally normal tricuspid valve. 

 There is trace tricuspid valve regurgitation. 

 The estimated pulmonary arterial pressure is 31 mmHg. 

 

 PULMONARY VALVE

 No pulmonary valve regurgitation or stenosis.  

 

 VESSELS

 The inferior vena cava is normal in size.  

 

 PERICARDIUM

 No pericardial effusion.  

 

 

 

 

  Jorden Heller MD

  (Electronically Signed)

  Final Date:13 February 2018 11:00

## 2018-02-13 NOTE — PD.CARD.PN
Subjective


Subjective Remarks


No angina, still c/o SOB, HR well controlled with diltiazem





Objective


Medications





Current Medications








 Medications


  (Trade)  Dose


 Ordered  Sig/Vinny


 Route  Start Time


 Stop Time Status Last Admin


 


  (Pepcid Inj)  10 mg  Q12HR


 IV PUSH  2/12/18 21:00


    2/13/18 08:56


 


 


  (Duoneb Neb)  1 ampule  Q4HR  NEB


 INH  2/12/18 12:00


    2/13/18 11:32


 


 


  (Duoneb Neb)  1 ampule  Q2HR NEB  PRN


 INH  2/12/18 09:45


     


 


 


  (Janice-Colace)  1 tab  BID


 PO  2/12/18 10:00


    2/13/18 08:57


 


 


  (Milk Of


 Magnesia Liq)  30 ml  Q12H  PRN


 PO  2/12/18 09:45


     


 


 


  (Senokot)  17.2 mg  Q12H  PRN


 PO  2/12/18 09:45


     


 


 


  (Dulcolax Supp)  10 mg  DAILY  PRN


 RECTAL  2/12/18 09:45


     


 


 


  (Lactulose Liq)  30 ml  DAILY  PRN


 PO  2/12/18 09:45


     


 


 


  (Levaquin)  750 mg  Q24H


 PO  2/12/18 11:00


    2/13/18 11:20


 


 


  (Symbicort


 160-4.5 Mcg Inh)  2 puff  Q12HR


 INH  2/12/18 10:30


    2/13/18 08:57


 


 


  (SoluMEDROL INJ)  40 mg  Q8H


 IV PUSH  2/12/18 11:00


    2/13/18 11:20


 


 


  (D50w (Vial) Inj)  50 ml  UNSCH  PRN


 IV PUSH  2/12/18 10:30


     


 


 


  (Glucagon Inj)  1 mg  UNSCH  PRN


 OTHER  2/12/18 10:30


     


 


 


  (NovoLIN R


 SUPPLEMENTAL


 SCALE)  1  Q6HR


 SQ  2/12/18 10:30


    2/13/18 11:22


 


 


  (Xarelto)  10 mg  BID


 PO  2/12/18 21:00


    2/13/18 08:57


 


 


 Miscellaneous


 Information  Patient in


 critical care


 unit?  Ass...  Q361D


 .XX  2/12/18 11:00


     


 


 


  (Chlorhexidine


 2% Cloth)  3 pack  DAILY@04


 TOPICAL  2/13/18 04:00


 2/17/18 04:01  2/13/18 03:38


 


 


  (Chlorhexidine


 2% Cloth)  3 pack  UNSCH  PRN


 TOPICAL  2/12/18 11:00


 2/17/18 10:57   


 


 


  (Lopressor)  25 mg  Q12HR


 PO  2/12/18 16:30


    2/13/18 08:56


 


 


  (Cardizem)  60 mg  Q6HR


 PO  2/12/18 18:15


    2/13/18 11:20


 


 


  (Romazicon Inj)  0.2 mg  Q1M  PRN


 IV PUSH  2/13/18 08:30


     


 


 


  (Ativan)  1 mg  Q4H  PRN


 PO  2/13/18 08:30


     


 


 


  (Ativan Inj)  1 mg  Q4H  PRN


 IV PUSH  2/13/18 08:30


     


 


 


  (Ativan)  2 mg  Q2H  PRN


 PO  2/13/18 08:30


     


 


 


  (Ativan Inj)  2 mg  Q2H  PRN


 IV PUSH  2/13/18 08:30


     


 


 


  (Ativan Inj)  2 mg  Q1H  PRN


 IV PUSH  2/13/18 08:30


     


 


 


  (Ativan Inj)  2 mg  Q15M  PRN


 IV PUSH  2/13/18 08:30


     


 








Vital Signs / I&O





Vital Signs








  Date Time  Temp Pulse Resp B/P (MAP) Pulse Ox O2 Delivery O2 Flow Rate FiO2


 


2/13/18 12:00  51      


 


2/13/18 12:00 98.3 51 17 131/83 (99) 100   


 


2/13/18 08:00  60      


 


2/13/18 08:00 98.2 61 12 119/69 (86) 100   


 


2/13/18 07:41     97 Nasal Cannula 4.00 


 


2/13/18 07:00     98 Nasal Cannula 4.00 


 


2/13/18 06:00  66      


 


2/13/18 04:00 98.0 60 16 93/56 (68) 91   


 


2/13/18 04:00  53      


 


2/13/18 02:00  64      


 


2/13/18 00:00  75      


 


2/13/18 00:00 97.8 85 14 130/79 (96) 100   


 


2/12/18 22:00  80      


 


2/12/18 20:00  95      


 


2/12/18 20:00 97.8 95 35 103/77 (86) 100   


 


2/12/18 19:44     100 Nasal Cannula 4.00 


 


2/12/18 19:00     100 Nasal Cannula 4.00 


 


2/12/18 18:00  122      














I/O      


 


 2/12/18 2/12/18 2/12/18 2/13/18 2/13/18 2/13/18





 07:00 15:00 23:00 07:00 15:00 23:00


 


Intake Total  100 ml 730 ml 240 ml  


 


Output Total  625 ml 1100 ml 450 ml  


 


Balance  -525 ml -370 ml -210 ml  


 


      


 


Intake Oral   480 ml 240 ml  


 


IV Total  100 ml 250 ml   


 


Output Urine Total  625 ml 1100 ml 450 ml  


 


# Voids  1    


 


# Bowel Movements    0  








Physical Exam


GENERAL: In mild resp distress, anxious


SKIN: Warm and dry.


HEAD: Normocephalic.


EYES: No scleral icterus. No injection or drainage. 


NECK: Supple, trachea midline. No JVD or lymphadenopathy.


CARDIOVASCULAR: Irregular rate and rhythm without murmurs, gallops, or rubs. 


RESPIRATORY: Breath sounds equal bilaterally. No accessory muscle use. Few 

rhonchi.


GASTROINTESTINAL: Abdomen soft, non-tender, nondistended. 


MUSCULOSKELETAL: No cyanosis, mild LE edema. 





Laboratory





Laboratory Tests








Test


  2/13/18


03:36


 


White Blood Count 11.6 TH/MM3 


 


Red Blood Count 5.10 MIL/MM3 


 


Hemoglobin 12.0 GM/DL 


 


Hematocrit 38.4 % 


 


Mean Corpuscular Volume 75.4 FL 


 


Mean Corpuscular Hemoglobin 23.5 PG 


 


Mean Corpuscular Hemoglobin


Concent 31.2 % 


 


 


Red Cell Distribution Width 19.4 % 


 


Platelet Count 130 TH/MM3 


 


Mean Platelet Volume 10.4 FL 


 


Neutrophils (%) (Auto) 93.1 % 


 


Lymphocytes (%) (Auto) 4.8 % 


 


Monocytes (%) (Auto) 2.0 % 


 


Eosinophils (%) (Auto) 0.0 % 


 


Basophils (%) (Auto) 0.1 % 


 


Neutrophils # (Auto) 10.8 TH/MM3 


 


Lymphocytes # (Auto) 0.6 TH/MM3 


 


Monocytes # (Auto) 0.2 TH/MM3 


 


Eosinophils # (Auto) 0.0 TH/MM3 


 


Basophils # (Auto) 0.0 TH/MM3 


 


CBC Comment DIFF FINAL 


 


Differential Comment  


 


Blood Urea Nitrogen 40 MG/DL 


 


Creatinine 1.29 MG/DL 


 


Random Glucose 85 MG/DL 


 


Calcium Level 8.3 MG/DL 


 


Phosphorus Level 4.0 MG/DL 


 


Magnesium Level 1.9 MG/DL 


 


Sodium Level 143 MEQ/L 


 


Potassium Level 3.8 MEQ/L 


 


Chloride Level 102 MEQ/L 


 


Carbon Dioxide Level 31.5 MEQ/L 


 


Anion Gap 10 MEQ/L 


 


Estimat Glomerular Filtration


Rate 55 ML/MIN 


 











Assessment and Plan


Problem List:  


(1) Atrial fibrillation with RVR


ICD Codes:  I48.91 - Unspecified atrial fibrillation


Status:  Acute


(2) CHF (congestive heart failure)


ICD Codes:  I50.9 - Heart failure, unspecified


(3) COPD with exacerbation


ICD Codes:  J44.1 - Chronic obstructive pulmonary disease with (acute) 

exacerbation


(4) Hypertension


ICD Codes:  I10 - Essential (primary) hypertension


Status:  Acute


(5) History of DVT of lower extremity


ICD Codes:  Z86.718 - Personal history of other venous thrombosis and embolism


Status:  Acute


(6) Anxiety


ICD Codes:  F41.9 - Anxiety disorder, unspecified


(7) Alcohol abuse


ICD Codes:  F10.10 - Alcohol abuse, uncomplicated


Status:  Acute


(8) Noncompliance


ICD Codes:  Z91.19 - Patient's noncompliance with other medical treatment and 

regimen


Assessment and Plan


AF rate well controlled with PO diltiazem and metoprolol. Echo with well 

preserved LV function. Recommend to continue anticoagulation although the 

patient is not sure if he wants to continue Xarelto long-term. Continue tx for 

COPD exac. Increase activity. Counseled on alcohol and compliance with medical 

care.











Agusto Randall MD Feb 13, 2018 16:25

## 2018-02-13 NOTE — HHI.PR
Subjective


Remarks


67 YOWM with COPD exac,AF with RVR


Breathing betetr


C/O Back pain


no fever


No CP





Objective


Vital Signs





Vital Signs








  Date Time  Temp Pulse Resp B/P (MAP) Pulse Ox O2 Delivery O2 Flow Rate FiO2


 


2/13/18 16:00  51      


 


2/13/18 16:00 97.7 86 47 126/58 (80) 80   


 


2/13/18 12:00  51      


 


2/13/18 12:00 98.3 51 17 131/83 (99) 100   


 


2/13/18 08:00  60      


 


2/13/18 08:00 98.2 61 12 119/69 (86) 100   


 


2/13/18 07:41     97 Nasal Cannula 4.00 


 


2/13/18 07:00     98 Nasal Cannula 4.00 


 


2/13/18 06:00  66      


 


2/13/18 04:00 98.0 60 16 93/56 (68) 91   


 


2/13/18 04:00  53      


 


2/13/18 02:00  64      


 


2/13/18 00:00  75      


 


2/13/18 00:00 97.8 85 14 130/79 (96) 100   


 


2/12/18 22:00  80      


 


2/12/18 20:00  95      


 


2/12/18 20:00 97.8 95 35 103/77 (86) 100   


 


2/12/18 19:44     100 Nasal Cannula 4.00 


 


2/12/18 19:00     100 Nasal Cannula 4.00 














I/O      


 


 2/12/18 2/12/18 2/12/18 2/13/18 2/13/18 2/13/18





 07:00 15:00 23:00 07:00 15:00 23:00


 


Intake Total  100 ml 730 ml 240 ml  720 ml


 


Output Total  625 ml 1100 ml 450 ml  500 ml


 


Balance  -525 ml -370 ml -210 ml  220 ml


 


      


 


Intake Oral   480 ml 240 ml  720 ml


 


IV Total  100 ml 250 ml   


 


Output Urine Total  625 ml 1100 ml 450 ml  500 ml


 


# Voids  1    


 


# Bowel Movements    0  0








Result Diagram:  


2/13/18 0336 2/13/18 0336





Objective Remarks


GENERAL: Elderly WM, mild sob


SKIN: Warm and dry.


HEAD: Normocephalic.


EYES: No scleral icterus. No injection or drainage. 


NECK: Supple, trachea midline. No JVD or lymphadenopathy.


CARDIOVASCULAR: Regular rate and rhythm without murmurs, gallops, or rubs. 


RESPIRATORY: Breath sounds equal bilaterally. No accessory muscle use.


Decreased chest excursion


GASTROINTESTINAL: Abdomen soft, non-tender, nondistended. 


MUSCULOSKELETAL: No cyanosis, or edema. 


BACK: Nontender without obvious deformity. No CVA tenderness.








A/P


Assessment and Plan


COPD with Exac


AF with RVR


Resp acidosis


DVT, s/p IVC filter


CHF





PLAN:





Aerosol nebs.


Symbicort 2 puffs bid


IV Solumedrol


Xarelto 10 mg bid


Supplement 02


PFT











Simeon Still MD Feb 13, 2018 18:36

## 2018-02-14 VITALS
OXYGEN SATURATION: 100 % | TEMPERATURE: 98.1 F | RESPIRATION RATE: 20 BRPM | DIASTOLIC BLOOD PRESSURE: 83 MMHG | HEART RATE: 66 BPM | SYSTOLIC BLOOD PRESSURE: 150 MMHG

## 2018-02-14 VITALS
OXYGEN SATURATION: 98 % | SYSTOLIC BLOOD PRESSURE: 150 MMHG | TEMPERATURE: 97.9 F | HEART RATE: 58 BPM | RESPIRATION RATE: 20 BRPM | DIASTOLIC BLOOD PRESSURE: 64 MMHG

## 2018-02-14 VITALS — SYSTOLIC BLOOD PRESSURE: 156 MMHG | HEART RATE: 76 BPM | RESPIRATION RATE: 16 BRPM | DIASTOLIC BLOOD PRESSURE: 75 MMHG

## 2018-02-14 VITALS
HEART RATE: 80 BPM | TEMPERATURE: 98.1 F | DIASTOLIC BLOOD PRESSURE: 80 MMHG | SYSTOLIC BLOOD PRESSURE: 154 MMHG | OXYGEN SATURATION: 93 % | RESPIRATION RATE: 18 BRPM

## 2018-02-14 VITALS — OXYGEN SATURATION: 95 %

## 2018-02-14 LAB
BUN SERPL-MCNC: 53 MG/DL (ref 7–18)
CALCIUM SERPL-MCNC: 8.9 MG/DL (ref 8.5–10.1)
CHLORIDE SERPL-SCNC: 103 MEQ/L (ref 98–107)
CREAT SERPL-MCNC: 1.2 MG/DL (ref 0.6–1.3)
ERYTHROCYTE [DISTWIDTH] IN BLOOD BY AUTOMATED COUNT: 19.7 % (ref 11.6–17.2)
GFR SERPLBLD BASED ON 1.73 SQ M-ARVRAT: 60 ML/MIN (ref 89–?)
GLUCOSE SERPL-MCNC: 154 MG/DL (ref 74–106)
HCO3 BLD-SCNC: 31.8 MEQ/L (ref 21–32)
HCT VFR BLD CALC: 33.6 % (ref 39–51)
HGB BLD-MCNC: 10.6 GM/DL (ref 13–17)
MCH RBC QN AUTO: 23.8 PG (ref 27–34)
MCHC RBC AUTO-ENTMCNC: 31.7 % (ref 32–36)
MCV RBC AUTO: 75.1 FL (ref 80–100)
PLATELET # BLD: 97 TH/MM3 (ref 150–450)
PMV BLD AUTO: 9.9 FL (ref 7–11)
RBC # BLD AUTO: 4.47 MIL/MM3 (ref 4.5–5.9)
SODIUM SERPL-SCNC: 141 MEQ/L (ref 136–145)
WBC # BLD AUTO: 13.4 TH/MM3 (ref 4–11)

## 2018-02-14 RX ADMIN — METHYLPREDNISOLONE SODIUM SUCCINATE SCH MG: 40 INJECTION, POWDER, FOR SOLUTION INTRAMUSCULAR; INTRAVENOUS at 11:17

## 2018-02-14 RX ADMIN — METHYLPREDNISOLONE SODIUM SUCCINATE SCH MG: 40 INJECTION, POWDER, FOR SOLUTION INTRAMUSCULAR; INTRAVENOUS at 02:53

## 2018-02-14 RX ADMIN — HUMAN INSULIN SCH: 100 INJECTION, SOLUTION SUBCUTANEOUS at 06:00

## 2018-02-14 RX ADMIN — STANDARDIZED SENNA CONCENTRATE AND DOCUSATE SODIUM SCH TAB: 8.6; 5 TABLET, FILM COATED ORAL at 08:51

## 2018-02-14 RX ADMIN — IPRATROPIUM BROMIDE AND ALBUTEROL SULFATE SCH AMPULE: .5; 3 SOLUTION RESPIRATORY (INHALATION) at 08:00

## 2018-02-14 RX ADMIN — METOPROLOL TARTRATE SCH MG: 25 TABLET, FILM COATED ORAL at 08:51

## 2018-02-14 RX ADMIN — FAMOTIDINE SCH MG: 10 INJECTION, SOLUTION INTRAVENOUS at 08:51

## 2018-02-14 RX ADMIN — BUDESONIDE AND FORMOTEROL FUMARATE DIHYDRATE SCH PUFF: 160; 4.5 AEROSOL RESPIRATORY (INHALATION) at 08:52

## 2018-02-14 RX ADMIN — IPRATROPIUM BROMIDE AND ALBUTEROL SULFATE SCH AMPULE: .5; 3 SOLUTION RESPIRATORY (INHALATION) at 03:44

## 2018-02-14 RX ADMIN — LEVOFLOXACIN SCH MG: 750 TABLET, FILM COATED ORAL at 11:17

## 2018-02-14 RX ADMIN — IPRATROPIUM BROMIDE AND ALBUTEROL SULFATE SCH AMPULE: .5; 3 SOLUTION RESPIRATORY (INHALATION) at 11:38

## 2018-02-14 RX ADMIN — RIVAROXABAN SCH MG: 10 TABLET, FILM COATED ORAL at 08:51

## 2018-02-14 NOTE — HHI.DS
__________________________________________________





Discharge Summary


Admission Date


Feb 12, 2018 at 08:17


Discharge Date:  Feb 14, 2018


Admitting Diagnosis





resp distress,afib with rvr





(1) COPD with exacerbation


ICD Code:  J44.1 - Chronic obstructive pulmonary disease with (acute) 

exacerbation


(2) History of DVT of lower extremity


ICD Code:  Z86.718 - Personal history of other venous thrombosis and embolism


Status:  Acute


(3) Noncompliance


ICD Code:  Z91.19 - Patient's noncompliance with other medical treatment and 

regimen


(4) Anxiety


ICD Code:  F41.9 - Anxiety disorder, unspecified


(5) Elevated brain natriuretic peptide (BNP) level


ICD Code:  R79.89 - Other specified abnormal findings of blood chemistry


(6) Alcohol abuse


ICD Code:  F10.10 - Alcohol abuse, uncomplicated


Status:  Acute


Procedures


None


Brief History - From Admission


HPI from the admitting physician


67-year-old male with past medical history of pulmonary embolism


and DVT left lower extremity in 2011 on Xarelto, COPD, anxiety disorder.  The


patient presented to [facility] ED with three to four day history


of respiratory distress. On arrival to the ER the patient was hypertensive with


systolic blood pressure in 180s and his EKG showed atrial fibrillation with


rapid ventricular response at a rate of 143 beats per minute.  He was given


Cardizem 15 mg IV push. In addition he was given Lasix 40 mg by paramedics.


The patient was initially placed on a BiPAP 16/8 with 100% FIO2 and his ABG


showed a pH of 7.34, CO2 48, pAO2 413, bicarb 25 and sats of 98%.  He was


weaned off BiPAP and is currently on 4 liters oxygen with good saturation.  His


laboratory data was significant for leukocytosis with a WBC of 19.5, however,


the patient is afebrile.  His lactic acid measured at 3.8 and .  A chest


x-ray in the ED showed left basilar atelectasis.  Subsequently the patient


underwent CT angiogram of the chest which showed no evidence of any central


pulmonary emboli.  However, minimal consolidative changes noted in the right


lung base.  He received vancomycin and Zosyn in the ER.  He denies any


associated symptoms of chest pain, cough or any constitutional symptoms.


Furthermore, the patient denies any orthopnea, PND or edema of lower


extremities.  He denies any use of oxygen at home.  The patient states that he


has a nebulizer at home, however, it is broken.  He quit smoking a month ago


and has a 40 pack-year history of smoking.  The patient denies any history of


coronary artery disease.


CBC/BMP:  


2/14/18 0535                                                                   

             2/14/18 0535





Significant Findings





Laboratory Tests








Test


  2/12/18


05:35 2/12/18


05:38 2/12/18


06:25 2/12/18


06:46


 


White Blood Count


  19.5 TH/MM3


(4.0-11.0) 


  


  


 


 


Hemoglobin


  11.9 GM/DL


(13.0-17.0) 


  


  


 


 


Hematocrit


  38.5 %


(39.0-51.0) 


  


  


 


 


Mean Corpuscular Volume


  75.8 FL


(80.0-100.0) 


  


  


 


 


Mean Corpuscular Hemoglobin


  23.4 PG


(27.0-34.0) 


  


  


 


 


Mean Corpuscular Hemoglobin


Concent 30.9 %


(32.0-36.0) 


  


  


 


 


Red Cell Distribution Width


  20.1 %


(11.6-17.2) 


  


  


 


 


Neutrophils (%) (Auto)


  89.3 %


(16.0-70.0) 


  


  


 


 


Lymphocytes (%) (Auto)


  7.6 %


(9.0-44.0) 


  


  


 


 


Neutrophils # (Auto)


  17.4 TH/MM3


(1.8-7.7) 


  


  


 


 


Blood Urea Nitrogen


  35 MG/DL


(7-18) 


  


  


 


 


Creatinine


  1.34 MG/DL


(0.60-1.30) 


  


  


 


 


Random Glucose


  208 MG/DL


() 


  


  


 


 


Calcium Level


  8.2 MG/DL


(8.5-10.1) 


  


  


 


 


Estimat Glomerular Filtration


Rate 53 ML/MIN


(>89) 


  


  


 


 


Troponin I


  0.11 NG/ML


(0.02-0.05) 


  


  


 


 


B-Type Natriuretic Peptide


  865 PG/ML


(0-100) 


  


  


 


 


Arterial Blood pH


  


  7.34


(7.380-7.420) 


  


 


 


Arterial Blood Partial


Pressure CO2 


  48 mmHg


(38-42) 


  


 


 


Arterial Blood Partial


Pressure O2 


  413 mmHG


() 


  


 


 


Blood Gas Hemoglobin


  


  11.3 G/DL


(12.0-16.0) 


  


 


 


Lactic Acid Level


  


  


  3.8 mmol/L


(0.4-2.0) 


 


 


Urine RBC    4 /hpf (0-3) 


 


Urine Mucus    FEW /lpf (OCC) 


 


Test


  2/12/18


10:02 2/12/18


10:30 2/12/18


14:43 2/13/18


03:36


 


Lactic Acid Level


  2.2 mmol/L


(0.4-2.0) 


  


  


 


 


White Blood Count


  


  


  13.2 TH/MM3


(4.0-11.0) 11.6 TH/MM3


(4.0-11.0)


 


Hemoglobin


  


  


  11.5 GM/DL


(13.0-17.0) 12.0 GM/DL


(13.0-17.0)


 


Hematocrit


  


  


  37.0 %


(39.0-51.0) 38.4 %


(39.0-51.0)


 


Mean Corpuscular Volume


  


  


  74.2 FL


(80.0-100.0) 75.4 FL


(80.0-100.0)


 


Mean Corpuscular Hemoglobin


  


  


  23.1 PG


(27.0-34.0) 23.5 PG


(27.0-34.0)


 


Mean Corpuscular Hemoglobin


Concent 


  


  31.1 %


(32.0-36.0) 31.2 %


(32.0-36.0)


 


Red Cell Distribution Width


  


  


  19.1 %


(11.6-17.2) 19.4 %


(11.6-17.2)


 


Platelet Count


  


  


  116 TH/MM3


(150-450) 130 TH/MM3


(150-450)


 


Neutrophils (%) (Auto)


  


  


  89.0 %


(16.0-70.0) 93.1 %


(16.0-70.0)


 


Lymphocytes (%) (Auto)


  


  


  6.5 %


(9.0-44.0) 4.8 %


(9.0-44.0)


 


Neutrophils # (Auto)


  


  


  11.8 TH/MM3


(1.8-7.7) 10.8 TH/MM3


(1.8-7.7)


 


Lymphocytes # (Auto)


  


  


  0.9 TH/MM3


(1.0-4.8) 0.6 TH/MM3


(1.0-4.8)


 


Platelet Estimate   LOW (NORMAL)  


 


Blood Urea Nitrogen


  


  


  33 MG/DL


(7-18) 40 MG/DL


(7-18)


 


Calcium Level


  


  


  7.8 MG/DL


(8.5-10.1) 8.3 MG/DL


(8.5-10.1)


 


Estimat Glomerular Filtration


Rate 


  


  57 ML/MIN


(>89) 55 ML/MIN


(>89)


 


Troponin I


  


  


  0.18 NG/ML


(0.02-0.05) 


 


 


Test


  2/14/18


05:35 


  


  


 


 


White Blood Count


  13.4 TH/MM3


(4.0-11.0) 


  


  


 


 


Red Blood Count


  4.47 MIL/MM3


(4.50-5.90) 


  


  


 


 


Hemoglobin


  10.6 GM/DL


(13.0-17.0) 


  


  


 


 


Hematocrit


  33.6 %


(39.0-51.0) 


  


  


 


 


Mean Corpuscular Volume


  75.1 FL


(80.0-100.0) 


  


  


 


 


Mean Corpuscular Hemoglobin


  23.8 PG


(27.0-34.0) 


  


  


 


 


Mean Corpuscular Hemoglobin


Concent 31.7 %


(32.0-36.0) 


  


  


 


 


Red Cell Distribution Width


  19.7 %


(11.6-17.2) 


  


  


 


 


Platelet Count


  97 TH/MM3


(150-450) 


  


  


 


 


Blood Urea Nitrogen


  53 MG/DL


(7-18) 


  


  


 


 


Random Glucose


  154 MG/DL


() 


  


  


 


 


Estimat Glomerular Filtration


Rate 60 ML/MIN


(>89) 


  


  


 








Imaging





Last Impressions








Chest X-Ray 2/12/18 0532 Signed





Impressions: 





 Service Date/Time:  Monday, February 12, 2018 05:51 - CONCLUSION:  Left 

basilar 





 atelectasis. Possible tiny left effusion.     Randal Calix MD 


 


CT Angiography 2/12/18 0000 Signed





Impressions: 





 Service Date/Time:  Monday, February 12, 2018 09:10 - CONCLUSION:  Minimal 





 consolidative changes right lung Negative for central pulmonary emboli 

Abnormal 





 axial skeleton unchanged from 10/11/16.      Montana Butcher MD  FACR








PE at Discharge


GENERAL: This is a well-nourished, well-developed patient, in no apparent 

distress.


CARDIOVASCULAR: Normal rate and regular rhythm without murmurs, gallops, or 

rubs. 


RESPIRATORY: Good respiratory efforts.  Diminished breath sounds at the bases 

bilaterally otherwise clear to auscultation.


GASTROINTESTINAL: Abdomen soft, non-tender, non-distended. Normal active bowel 

sounds


MUSCULOSKELETAL: Extremities without cyanosis, or edema.


NEURO:  Alert & Oriented x4 to person, place, time, situation.  Moves all ext x4


PSYCH: Appropriate mood and affect.


Pt update on day of discharge


Patient reports he is feeling much better.  He is breathing much more 

comfortable.


Hospital Course


67-year-old male admitted and treated for the following:





Acute respiratory failure secondary to COPD exacerbation:


-Patient treated with IV Solu-Medrol.  He was transitioned to oral steroids.  

He was treated with oral Levaquin for COPD exacerbation.  He is discharged on a 

short course of antibiotics and a prednisone taper.  Patient was strongly 

counseled against smoking.  





A. fib with aVR:


-Patient was followed by cardiology


- Diltiazem added.  Continue metoprolol and Xarelto.  Patient agrees that he 

needs Xarelto.  He will continue to take this medication.





Elevated troponins: Agree with cardiologist assessment, likely secondary to A. 

fib.


- Continue rate control medications.





Alcohol abuse:


- Patient counseled on cessation.


- CIWA protocol





Anxiety: Likely related to alcohol dependence.


- Patient counseled re alcohol or benzodiazepines are not the right medications 

for generalized anxiety.  He is advised to follow-up outpatient with PCP or 

psychiatry to consider long-term medications for treatment of anxiety such as 

SSRI.  He is not interested in starting this medication at this time.





History of PE and DVT:


-Continue Xarelto





Elevated BNP: No overt heart failure.  LVEF preserved on 2D echocardiogram.  

Hold off on diuretics.


Pt Condition on Discharge:  Good


Discharge Disposition:  Discharge to SNF


Discharge Time:  > 30 minutes


Discharge Instructions


DIET: Follow Instructions for:  Heart Healthy Diet


Activities you can perform:  Regular-No Restrictions


Follow up Referrals:  


PCP Follow-up





New Medications:  


Prednisone (21) 10 mg tab Dose Pack (Prednisone (21) 10 mg tab Dose Pack) 10 Mg 

Pack


10 MG PO AS DIRECTED for Inflammation, #1 DSPK 0 Refills





Diltiazem CD 24 HR (Cardizem CD 24 HR) 180 Mg Caper


180 MG PO DAILY, #30 CAP





Levofloxacin (Levaquin) 750 Mg Tablet


750 MG PO Q24H, #5 TAB





[Budeson-Formot 160-4.5 Mcg Inh] () 60 PUFF AERO


2 PUFF INH Q12HR, #1 INH





 


Continued Medications:  


Albuterol Neb (Albuterol Neb) 2.5 Mg/0.5 Ml Neb


2.5 MG NEB Q6HR NEB, BOX


Note: The Albuterol Sulfate Inhalation Solution is concentrated and


 must be diluted. Read complete instructions carefully before using.


Metoprolol Tartrate (Metoprolol Tartrate) 25 Mg Tab


25 MG PO BID, #60 TAB 0 Refills (This prescription has been renewed)





Omeprazole (Omeprazole) 20 Mg Tab


20 MG PO DAILY, #30 TAB 0 Refills





Rivaroxaban (Xarelto) 10 Mg Tab


10 MG PO BID for Blood Clot Prevention, #30 TAB 0 Refills (This prescription 

has been renewed)





 


Discontinued Medications:  


Furosemide (Lasix) 20 Mg Tab


20 MG PO DAILY, #30 TAB 0 Refills





Methylprednisolone (Methylprednisolone) 8 Mg Tab


4 MG PO BID, TAB 0 Refills

















Mirza Atkinson MD Feb 14, 2018 09:03

## 2018-02-20 NOTE — RSPPFT
DATE OF PROCEDURE:   2/14/18



COMMENTS:   



Spirometry shows FVC of 1.7 at 63% of predicted, FEV1 of 0.7 at 34%, FEV1/FVC ratio is 
decreased. Flow is decreased at FEF 25, FEF 50, FEF 75 and FEF 25-75. There is a good 
response after bronchodilator treatment.  



IMPRESSION:    

   

1.    Severe obstructive lung disease.

2.    Good response after bronchodilator treatment.

## 2018-03-26 ENCOUNTER — HOSPITAL ENCOUNTER (INPATIENT)
Dept: HOSPITAL 17 - NEPC | Age: 68
LOS: 10 days | Discharge: HOSPICE | DRG: 870 | End: 2018-04-05
Attending: INTERNAL MEDICINE | Admitting: INTERNAL MEDICINE
Payer: MEDICARE

## 2018-03-26 VITALS
SYSTOLIC BLOOD PRESSURE: 96 MMHG | RESPIRATION RATE: 24 BRPM | DIASTOLIC BLOOD PRESSURE: 69 MMHG | HEART RATE: 122 BPM | TEMPERATURE: 99.7 F | OXYGEN SATURATION: 98 %

## 2018-03-26 VITALS
DIASTOLIC BLOOD PRESSURE: 26 MMHG | RESPIRATION RATE: 34 BRPM | SYSTOLIC BLOOD PRESSURE: 41 MMHG | OXYGEN SATURATION: 90 % | HEART RATE: 104 BPM

## 2018-03-26 VITALS
DIASTOLIC BLOOD PRESSURE: 62 MMHG | RESPIRATION RATE: 18 BRPM | OXYGEN SATURATION: 91 % | TEMPERATURE: 98.6 F | SYSTOLIC BLOOD PRESSURE: 131 MMHG | HEART RATE: 115 BPM

## 2018-03-26 VITALS
TEMPERATURE: 99.5 F | RESPIRATION RATE: 22 BRPM | OXYGEN SATURATION: 96 % | DIASTOLIC BLOOD PRESSURE: 53 MMHG | SYSTOLIC BLOOD PRESSURE: 119 MMHG | HEART RATE: 117 BPM

## 2018-03-26 VITALS
DIASTOLIC BLOOD PRESSURE: 72 MMHG | SYSTOLIC BLOOD PRESSURE: 148 MMHG | HEART RATE: 133 BPM | RESPIRATION RATE: 28 BRPM | OXYGEN SATURATION: 100 % | TEMPERATURE: 101.3 F

## 2018-03-26 VITALS
SYSTOLIC BLOOD PRESSURE: 90 MMHG | RESPIRATION RATE: 18 BRPM | TEMPERATURE: 99.1 F | DIASTOLIC BLOOD PRESSURE: 92 MMHG | HEART RATE: 114 BPM | OXYGEN SATURATION: 95 %

## 2018-03-26 VITALS
RESPIRATION RATE: 20 BRPM | OXYGEN SATURATION: 100 % | TEMPERATURE: 102.6 F | DIASTOLIC BLOOD PRESSURE: 83 MMHG | SYSTOLIC BLOOD PRESSURE: 139 MMHG | HEART RATE: 102 BPM

## 2018-03-26 VITALS
SYSTOLIC BLOOD PRESSURE: 106 MMHG | DIASTOLIC BLOOD PRESSURE: 59 MMHG | HEART RATE: 110 BPM | RESPIRATION RATE: 16 BRPM | OXYGEN SATURATION: 93 %

## 2018-03-26 VITALS
RESPIRATION RATE: 38 BRPM | SYSTOLIC BLOOD PRESSURE: 199 MMHG | HEART RATE: 168 BPM | DIASTOLIC BLOOD PRESSURE: 110 MMHG | TEMPERATURE: 103.1 F

## 2018-03-26 VITALS
SYSTOLIC BLOOD PRESSURE: 44 MMHG | HEART RATE: 110 BPM | OXYGEN SATURATION: 90 % | RESPIRATION RATE: 33 BRPM | DIASTOLIC BLOOD PRESSURE: 12 MMHG

## 2018-03-26 VITALS
RESPIRATION RATE: 18 BRPM | TEMPERATURE: 101.1 F | DIASTOLIC BLOOD PRESSURE: 59 MMHG | SYSTOLIC BLOOD PRESSURE: 101 MMHG | OXYGEN SATURATION: 100 % | HEART RATE: 101 BPM

## 2018-03-26 VITALS
TEMPERATURE: 102 F | HEART RATE: 100 BPM | DIASTOLIC BLOOD PRESSURE: 67 MMHG | SYSTOLIC BLOOD PRESSURE: 142 MMHG | RESPIRATION RATE: 20 BRPM | OXYGEN SATURATION: 100 %

## 2018-03-26 VITALS
OXYGEN SATURATION: 100 % | DIASTOLIC BLOOD PRESSURE: 68 MMHG | RESPIRATION RATE: 20 BRPM | TEMPERATURE: 101.8 F | HEART RATE: 106 BPM | SYSTOLIC BLOOD PRESSURE: 101 MMHG

## 2018-03-26 VITALS
DIASTOLIC BLOOD PRESSURE: 67 MMHG | SYSTOLIC BLOOD PRESSURE: 100 MMHG | OXYGEN SATURATION: 98 % | HEART RATE: 118 BPM | RESPIRATION RATE: 35 BRPM

## 2018-03-26 VITALS
RESPIRATION RATE: 51 BRPM | DIASTOLIC BLOOD PRESSURE: 60 MMHG | SYSTOLIC BLOOD PRESSURE: 126 MMHG | OXYGEN SATURATION: 97 % | HEART RATE: 122 BPM

## 2018-03-26 VITALS
DIASTOLIC BLOOD PRESSURE: 81 MMHG | HEART RATE: 104 BPM | OXYGEN SATURATION: 91 % | SYSTOLIC BLOOD PRESSURE: 106 MMHG | RESPIRATION RATE: 36 BRPM

## 2018-03-26 VITALS — HEART RATE: 110 BPM | DIASTOLIC BLOOD PRESSURE: 52 MMHG | RESPIRATION RATE: 32 BRPM | SYSTOLIC BLOOD PRESSURE: 104 MMHG

## 2018-03-26 VITALS
SYSTOLIC BLOOD PRESSURE: 110 MMHG | RESPIRATION RATE: 35 BRPM | DIASTOLIC BLOOD PRESSURE: 70 MMHG | OXYGEN SATURATION: 96 % | HEART RATE: 124 BPM

## 2018-03-26 VITALS
HEART RATE: 98 BPM | TEMPERATURE: 101.6 F | SYSTOLIC BLOOD PRESSURE: 99 MMHG | RESPIRATION RATE: 18 BRPM | DIASTOLIC BLOOD PRESSURE: 71 MMHG | OXYGEN SATURATION: 98 %

## 2018-03-26 VITALS
HEART RATE: 118 BPM | SYSTOLIC BLOOD PRESSURE: 117 MMHG | OXYGEN SATURATION: 98 % | DIASTOLIC BLOOD PRESSURE: 57 MMHG | RESPIRATION RATE: 34 BRPM

## 2018-03-26 VITALS
OXYGEN SATURATION: 100 % | DIASTOLIC BLOOD PRESSURE: 68 MMHG | SYSTOLIC BLOOD PRESSURE: 134 MMHG | RESPIRATION RATE: 20 BRPM | TEMPERATURE: 98.2 F | HEART RATE: 103 BPM

## 2018-03-26 VITALS
DIASTOLIC BLOOD PRESSURE: 68 MMHG | OXYGEN SATURATION: 100 % | TEMPERATURE: 102.3 F | RESPIRATION RATE: 22 BRPM | SYSTOLIC BLOOD PRESSURE: 110 MMHG | HEART RATE: 115 BPM

## 2018-03-26 VITALS
HEART RATE: 124 BPM | OXYGEN SATURATION: 97 % | RESPIRATION RATE: 53 BRPM | DIASTOLIC BLOOD PRESSURE: 70 MMHG | SYSTOLIC BLOOD PRESSURE: 140 MMHG

## 2018-03-26 VITALS
DIASTOLIC BLOOD PRESSURE: 85 MMHG | RESPIRATION RATE: 33 BRPM | HEART RATE: 108 BPM | SYSTOLIC BLOOD PRESSURE: 113 MMHG | OXYGEN SATURATION: 91 %

## 2018-03-26 VITALS
TEMPERATURE: 99.1 F | RESPIRATION RATE: 18 BRPM | SYSTOLIC BLOOD PRESSURE: 123 MMHG | OXYGEN SATURATION: 100 % | DIASTOLIC BLOOD PRESSURE: 67 MMHG | HEART RATE: 108 BPM

## 2018-03-26 VITALS — HEIGHT: 66 IN | WEIGHT: 166.67 LBS | BODY MASS INDEX: 26.79 KG/M2

## 2018-03-26 VITALS
HEART RATE: 126 BPM | RESPIRATION RATE: 35 BRPM | DIASTOLIC BLOOD PRESSURE: 64 MMHG | OXYGEN SATURATION: 98 % | SYSTOLIC BLOOD PRESSURE: 120 MMHG

## 2018-03-26 VITALS — OXYGEN SATURATION: 93 %

## 2018-03-26 VITALS
RESPIRATION RATE: 43 BRPM | HEART RATE: 106 BPM | OXYGEN SATURATION: 94 % | SYSTOLIC BLOOD PRESSURE: 114 MMHG | DIASTOLIC BLOOD PRESSURE: 75 MMHG

## 2018-03-26 VITALS
SYSTOLIC BLOOD PRESSURE: 102 MMHG | HEART RATE: 120 BPM | OXYGEN SATURATION: 98 % | RESPIRATION RATE: 34 BRPM | DIASTOLIC BLOOD PRESSURE: 52 MMHG

## 2018-03-26 VITALS
RESPIRATION RATE: 38 BRPM | OXYGEN SATURATION: 100 % | HEART RATE: 156 BPM | SYSTOLIC BLOOD PRESSURE: 148 MMHG | DIASTOLIC BLOOD PRESSURE: 89 MMHG

## 2018-03-26 VITALS
DIASTOLIC BLOOD PRESSURE: 68 MMHG | RESPIRATION RATE: 24 BRPM | HEART RATE: 114 BPM | SYSTOLIC BLOOD PRESSURE: 159 MMHG | TEMPERATURE: 102.2 F | OXYGEN SATURATION: 98 %

## 2018-03-26 VITALS
HEART RATE: 128 BPM | SYSTOLIC BLOOD PRESSURE: 134 MMHG | RESPIRATION RATE: 45 BRPM | OXYGEN SATURATION: 98 % | DIASTOLIC BLOOD PRESSURE: 66 MMHG

## 2018-03-26 VITALS — OXYGEN SATURATION: 100 %

## 2018-03-26 VITALS — HEART RATE: 118 BPM | DIASTOLIC BLOOD PRESSURE: 92 MMHG | RESPIRATION RATE: 35 BRPM | SYSTOLIC BLOOD PRESSURE: 143 MMHG

## 2018-03-26 VITALS
SYSTOLIC BLOOD PRESSURE: 121 MMHG | OXYGEN SATURATION: 90 % | RESPIRATION RATE: 40 BRPM | HEART RATE: 110 BPM | DIASTOLIC BLOOD PRESSURE: 78 MMHG

## 2018-03-26 VITALS
RESPIRATION RATE: 63 BRPM | DIASTOLIC BLOOD PRESSURE: 83 MMHG | OXYGEN SATURATION: 97 % | SYSTOLIC BLOOD PRESSURE: 115 MMHG | HEART RATE: 120 BPM

## 2018-03-26 VITALS
RESPIRATION RATE: 39 BRPM | SYSTOLIC BLOOD PRESSURE: 134 MMHG | DIASTOLIC BLOOD PRESSURE: 68 MMHG | OXYGEN SATURATION: 98 % | HEART RATE: 128 BPM

## 2018-03-26 VITALS — TEMPERATURE: 99.2 F | HEART RATE: 108 BPM

## 2018-03-26 VITALS — HEART RATE: 123 BPM

## 2018-03-26 VITALS
OXYGEN SATURATION: 91 % | HEART RATE: 106 BPM | DIASTOLIC BLOOD PRESSURE: 92 MMHG | RESPIRATION RATE: 38 BRPM | TEMPERATURE: 99.2 F | SYSTOLIC BLOOD PRESSURE: 208 MMHG

## 2018-03-26 VITALS
OXYGEN SATURATION: 94 % | DIASTOLIC BLOOD PRESSURE: 59 MMHG | SYSTOLIC BLOOD PRESSURE: 109 MMHG | HEART RATE: 101 BPM | TEMPERATURE: 98.2 F

## 2018-03-26 VITALS — DIASTOLIC BLOOD PRESSURE: 81 MMHG | RESPIRATION RATE: 33 BRPM | SYSTOLIC BLOOD PRESSURE: 140 MMHG | HEART RATE: 116 BPM

## 2018-03-26 VITALS — OXYGEN SATURATION: 94 %

## 2018-03-26 VITALS — OXYGEN SATURATION: 95 %

## 2018-03-26 VITALS — OXYGEN SATURATION: 100 % | RESPIRATION RATE: 38 BRPM

## 2018-03-26 VITALS — HEART RATE: 112 BPM | SYSTOLIC BLOOD PRESSURE: 76 MMHG | RESPIRATION RATE: 20 BRPM | DIASTOLIC BLOOD PRESSURE: 42 MMHG

## 2018-03-26 VITALS — OXYGEN SATURATION: 100 % | TEMPERATURE: 102.1 F

## 2018-03-26 VITALS — OXYGEN SATURATION: 99 %
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
## 2018-03-26 DIAGNOSIS — J44.1: ICD-10-CM

## 2018-03-26 DIAGNOSIS — I25.2: ICD-10-CM

## 2018-03-26 DIAGNOSIS — J96.01: ICD-10-CM

## 2018-03-26 DIAGNOSIS — F17.210: ICD-10-CM

## 2018-03-26 DIAGNOSIS — Z86.711: ICD-10-CM

## 2018-03-26 DIAGNOSIS — D64.9: ICD-10-CM

## 2018-03-26 DIAGNOSIS — I45.10: ICD-10-CM

## 2018-03-26 DIAGNOSIS — E87.5: ICD-10-CM

## 2018-03-26 DIAGNOSIS — K21.9: ICD-10-CM

## 2018-03-26 DIAGNOSIS — E43: ICD-10-CM

## 2018-03-26 DIAGNOSIS — J44.0: ICD-10-CM

## 2018-03-26 DIAGNOSIS — J96.02: ICD-10-CM
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
## 2018-03-26 DIAGNOSIS — A41.9: Primary | ICD-10-CM

## 2018-03-26 DIAGNOSIS — R00.1: ICD-10-CM

## 2018-03-26 DIAGNOSIS — K72.00: ICD-10-CM

## 2018-03-26 DIAGNOSIS — Z86.718: ICD-10-CM

## 2018-03-26 DIAGNOSIS — E88.09: ICD-10-CM

## 2018-03-26 DIAGNOSIS — G93.41: ICD-10-CM

## 2018-03-26 DIAGNOSIS — Z51.5: ICD-10-CM

## 2018-03-26 DIAGNOSIS — I48.91: ICD-10-CM

## 2018-03-26 DIAGNOSIS — J18.9: ICD-10-CM

## 2018-03-26 DIAGNOSIS — I25.10: ICD-10-CM

## 2018-03-26 DIAGNOSIS — R65.21: ICD-10-CM

## 2018-03-26 DIAGNOSIS — G93.1: ICD-10-CM

## 2018-03-26 DIAGNOSIS — Z66: ICD-10-CM

## 2018-03-26 DIAGNOSIS — R64: ICD-10-CM

## 2018-03-26 DIAGNOSIS — I50.9: ICD-10-CM

## 2018-03-26 DIAGNOSIS — I46.9: ICD-10-CM

## 2018-03-26 DIAGNOSIS — E16.2: ICD-10-CM

## 2018-03-26 DIAGNOSIS — I11.0: ICD-10-CM

## 2018-03-26 DIAGNOSIS — N17.0: ICD-10-CM

## 2018-03-26 DIAGNOSIS — E87.2: ICD-10-CM

## 2018-03-26 DIAGNOSIS — F10.239: ICD-10-CM

## 2018-03-26 LAB
ALBUMIN SERPL-MCNC: 2.3 GM/DL (ref 3.4–5)
ALBUMIN SERPL-MCNC: 2.4 GM/DL (ref 3.4–5)
ALBUMIN SERPL-MCNC: 3 GM/DL (ref 3.4–5)
ALP SERPL-CCNC: 104 U/L (ref 45–117)
ALP SERPL-CCNC: 106 U/L (ref 45–117)
ALP SERPL-CCNC: 75 U/L (ref 45–117)
ALT SERPL-CCNC: 1141 U/L (ref 12–78)
ALT SERPL-CCNC: 1940 U/L (ref 12–78)
ALT SERPL-CCNC: 44 U/L (ref 12–78)
AST SERPL-CCNC: 1121 U/L (ref 15–37)
AST SERPL-CCNC: 1631 U/L (ref 15–37)
AST SERPL-CCNC: 60 U/L (ref 15–37)
BASOPHILS # BLD AUTO: 0 TH/MM3 (ref 0–0.2)
BASOPHILS # BLD AUTO: 0.1 TH/MM3 (ref 0–0.2)
BASOPHILS NFR BLD: 0.1 % (ref 0–2)
BASOPHILS NFR BLD: 0.5 % (ref 0–2)
BILIRUB SERPL-MCNC: 0.6 MG/DL (ref 0.2–1)
BILIRUB SERPL-MCNC: 1.3 MG/DL (ref 0.2–1)
BILIRUB SERPL-MCNC: 1.4 MG/DL (ref 0.2–1)
BUN SERPL-MCNC: 25 MG/DL (ref 7–18)
BUN SERPL-MCNC: 27 MG/DL (ref 7–18)
BUN SERPL-MCNC: 28 MG/DL (ref 7–18)
CALCIUM SERPL-MCNC: 7.6 MG/DL (ref 8.5–10.1)
CALCIUM SERPL-MCNC: 7.6 MG/DL (ref 8.5–10.1)
CALCIUM SERPL-MCNC: 7.7 MG/DL (ref 8.5–10.1)
CHLORIDE SERPL-SCNC: 108 MEQ/L (ref 98–107)
CHLORIDE SERPL-SCNC: 114 MEQ/L (ref 98–107)
CHLORIDE SERPL-SCNC: 116 MEQ/L (ref 98–107)
CHLORIDE SERPL-SCNC: 116 MEQ/L (ref 98–107)
COLOR UR: YELLOW
CREAT SERPL-MCNC: 1.18 MG/DL (ref 0.6–1.3)
CREAT SERPL-MCNC: 1.19 MG/DL (ref 0.6–1.3)
CREAT SERPL-MCNC: 1.46 MG/DL (ref 0.6–1.3)
DACRYOCYTES BLD QL SMEAR: (no result)
EOSINOPHIL # BLD: 0 TH/MM3 (ref 0–0.4)
EOSINOPHIL # BLD: 0.1 TH/MM3 (ref 0–0.4)
EOSINOPHIL NFR BLD: 0.1 % (ref 0–4)
EOSINOPHIL NFR BLD: 0.2 % (ref 0–4)
ERYTHROCYTE [DISTWIDTH] IN BLOOD BY AUTOMATED COUNT: 24.2 % (ref 11.6–17.2)
ERYTHROCYTE [DISTWIDTH] IN BLOOD BY AUTOMATED COUNT: 24.2 % (ref 11.6–17.2)
GFR SERPLBLD BASED ON 1.73 SQ M-ARVRAT: 48 ML/MIN (ref 89–?)
GFR SERPLBLD BASED ON 1.73 SQ M-ARVRAT: 61 ML/MIN (ref 89–?)
GFR SERPLBLD BASED ON 1.73 SQ M-ARVRAT: 61 ML/MIN (ref 89–?)
GLUCOSE SERPL-MCNC: 14 MG/DL (ref 74–106)
GLUCOSE SERPL-MCNC: 182 MG/DL (ref 74–106)
GLUCOSE SERPL-MCNC: 31 MG/DL (ref 74–106)
GLUCOSE UR STRIP-MCNC: (no result) MG/DL
HCO3 BLD-SCNC: 20.9 MEQ/L (ref 21–32)
HCO3 BLD-SCNC: 21.5 MEQ/L (ref 21–32)
HCO3 BLD-SCNC: 23 MEQ/L (ref 21–32)
HCT VFR BLD CALC: 35.7 % (ref 39–51)
HCT VFR BLD CALC: 40.3 % (ref 39–51)
HGB BLD-MCNC: 10.8 GM/DL (ref 13–17)
HGB BLD-MCNC: 11.9 GM/DL (ref 13–17)
HGB UR QL STRIP: (no result)
INR PPP: 1.2 RATIO
KETONES UR STRIP-MCNC: (no result) MG/DL
LACTIC ACID SEPSIS PROTOCOL: 2.3 MMOL/L (ref 0.4–2)
LACTIC ACID SEPSIS PROTOCOL: 3.3 MMOL/L (ref 0.4–2)
LYMPHOCYTES # BLD AUTO: 1.4 TH/MM3 (ref 1–4.8)
LYMPHOCYTES # BLD AUTO: 3 TH/MM3 (ref 1–4.8)
LYMPHOCYTES NFR BLD AUTO: 13 % (ref 9–44)
LYMPHOCYTES NFR BLD AUTO: 4.6 % (ref 9–44)
LYMPHOCYTES: 10 % (ref 9–44)
LYMPHOCYTES: 6 % (ref 9–44)
MCH RBC QN AUTO: 23.2 PG (ref 27–34)
MCH RBC QN AUTO: 23.4 PG (ref 27–34)
MCHC RBC AUTO-ENTMCNC: 29.4 % (ref 32–36)
MCHC RBC AUTO-ENTMCNC: 30.2 % (ref 32–36)
MCV RBC AUTO: 77.5 FL (ref 80–100)
MCV RBC AUTO: 78.7 FL (ref 80–100)
MONOCYTE #: 0.4 TH/MM3 (ref 0–0.9)
MONOCYTE #: 0.7 TH/MM3 (ref 0–0.9)
MONOCYTES NFR BLD: 1.2 % (ref 0–8)
MONOCYTES NFR BLD: 2.9 % (ref 0–8)
MONOCYTES: 1 % (ref 0–8)
MONOCYTES: 5 % (ref 0–8)
MUCOUS THREADS #/AREA URNS LPF: (no result) /LPF
MYELOCYTES NFR BLD: 1 % (ref 0–0)
NEUTROPHILS # BLD AUTO: 19.4 TH/MM3 (ref 1.8–7.7)
NEUTROPHILS # BLD AUTO: 27.9 TH/MM3 (ref 1.8–7.7)
NEUTROPHILS NFR BLD AUTO: 83.8 % (ref 16–70)
NEUTROPHILS NFR BLD AUTO: 93.6 % (ref 16–70)
NEUTS BAND # BLD MANUAL: 19.4 TH/MM3 (ref 1.8–7.7)
NEUTS BAND # BLD MANUAL: 27.7 TH/MM3 (ref 1.8–7.7)
NEUTS BAND NFR BLD: 10 % (ref 0–6)
NEUTS BAND NFR BLD: 15 % (ref 0–6)
NEUTS SEG NFR BLD MANUAL: 73 % (ref 16–70)
NEUTS SEG NFR BLD MANUAL: 78 % (ref 16–70)
NITRITE UR QL STRIP: (no result)
NUCLEATED RED BLOOD CELL: 1 (ref 0–0)
OVALOCYTES BLD QL SMEAR: (no result)
OVALOCYTES BLD QL SMEAR: (no result)
PLATELET # BLD: 218 TH/MM3 (ref 150–450)
PLATELET # BLD: 232 TH/MM3 (ref 150–450)
PMV BLD AUTO: 8.9 FL (ref 7–11)
PMV BLD AUTO: 9.3 FL (ref 7–11)
POLYCHROMASIA BLD QL SMEAR: 2.2 % (ref 0–1.9)
PROT SERPL-MCNC: 5 GM/DL (ref 6.4–8.2)
PROT SERPL-MCNC: 5.2 GM/DL (ref 6.4–8.2)
PROT SERPL-MCNC: 6.1 GM/DL (ref 6.4–8.2)
PROTHROMBIN TIME: 12.4 SEC (ref 9.8–11.6)
RBC # BLD AUTO: 4.61 MIL/MM3 (ref 4.5–5.9)
RBC # BLD AUTO: 5.13 MIL/MM3 (ref 4.5–5.9)
SODIUM SERPL-SCNC: 141 MEQ/L (ref 136–145)
SODIUM SERPL-SCNC: 144 MEQ/L (ref 136–145)
SODIUM SERPL-SCNC: 146 MEQ/L (ref 136–145)
SODIUM SERPL-SCNC: 147 MEQ/L (ref 136–145)
SP GR UR STRIP: 1.02 (ref 1–1.03)
TROPONIN I SERPL-MCNC: 0.05 NG/ML (ref 0.02–0.05)
URINE LEUKOCYTE ESTERASE: (no result)
WBC # BLD AUTO: 23.1 TH/MM3 (ref 4–11)
WBC # BLD AUTO: 29.8 TH/MM3 (ref 4–11)
WBC NRBC COR # BLD: 1 /100 WBC (ref 0–0)

## 2018-03-26 PROCEDURE — 87077 CULTURE AEROBIC IDENTIFY: CPT

## 2018-03-26 PROCEDURE — 80202 ASSAY OF VANCOMYCIN: CPT

## 2018-03-26 PROCEDURE — 96365 THER/PROPH/DIAG IV INF INIT: CPT

## 2018-03-26 PROCEDURE — 85730 THROMBOPLASTIN TIME PARTIAL: CPT

## 2018-03-26 PROCEDURE — 83735 ASSAY OF MAGNESIUM: CPT

## 2018-03-26 PROCEDURE — 94640 AIRWAY INHALATION TREATMENT: CPT

## 2018-03-26 PROCEDURE — 87804 INFLUENZA ASSAY W/OPTIC: CPT

## 2018-03-26 PROCEDURE — 80048 BASIC METABOLIC PNL TOTAL CA: CPT

## 2018-03-26 PROCEDURE — 71045 X-RAY EXAM CHEST 1 VIEW: CPT

## 2018-03-26 PROCEDURE — 86901 BLOOD TYPING SEROLOGIC RH(D): CPT

## 2018-03-26 PROCEDURE — 84155 ASSAY OF PROTEIN SERUM: CPT

## 2018-03-26 PROCEDURE — 87186 SC STD MICRODIL/AGAR DIL: CPT

## 2018-03-26 PROCEDURE — 82570 ASSAY OF URINE CREATININE: CPT

## 2018-03-26 PROCEDURE — P9045 ALBUMIN (HUMAN), 5%, 250 ML: HCPCS

## 2018-03-26 PROCEDURE — 86022 PLATELET ANTIBODIES: CPT

## 2018-03-26 PROCEDURE — 84100 ASSAY OF PHOSPHORUS: CPT

## 2018-03-26 PROCEDURE — 80074 ACUTE HEPATITIS PANEL: CPT

## 2018-03-26 PROCEDURE — 94664 DEMO&/EVAL PT USE INHALER: CPT

## 2018-03-26 PROCEDURE — 70450 CT HEAD/BRAIN W/O DYE: CPT

## 2018-03-26 PROCEDURE — 03HY32Z INSERTION OF MONITORING DEVICE INTO UPPER ARTERY, PERCUTANEOUS APPROACH: ICD-10-PCS | Performed by: INTERNAL MEDICINE

## 2018-03-26 PROCEDURE — 81001 URINALYSIS AUTO W/SCOPE: CPT

## 2018-03-26 PROCEDURE — 82948 REAGENT STRIP/BLOOD GLUCOSE: CPT

## 2018-03-26 PROCEDURE — 02HV33Z INSERTION OF INFUSION DEVICE INTO SUPERIOR VENA CAVA, PERCUTANEOUS APPROACH: ICD-10-PCS | Performed by: INTERNAL MEDICINE

## 2018-03-26 PROCEDURE — P9016 RBC LEUKOCYTES REDUCED: HCPCS

## 2018-03-26 PROCEDURE — 83930 ASSAY OF BLOOD OSMOLALITY: CPT

## 2018-03-26 PROCEDURE — 87086 URINE CULTURE/COLONY COUNT: CPT

## 2018-03-26 PROCEDURE — 96376 TX/PRO/DX INJ SAME DRUG ADON: CPT

## 2018-03-26 PROCEDURE — 80053 COMPREHEN METABOLIC PANEL: CPT

## 2018-03-26 PROCEDURE — 85027 COMPLETE CBC AUTOMATED: CPT

## 2018-03-26 PROCEDURE — 86900 BLOOD TYPING SEROLOGIC ABO: CPT

## 2018-03-26 PROCEDURE — 5A1955Z RESPIRATORY VENTILATION, GREATER THAN 96 CONSECUTIVE HOURS: ICD-10-PCS | Performed by: EMERGENCY MEDICINE

## 2018-03-26 PROCEDURE — 84134 ASSAY OF PREALBUMIN: CPT

## 2018-03-26 PROCEDURE — 84300 ASSAY OF URINE SODIUM: CPT

## 2018-03-26 PROCEDURE — P9047 ALBUMIN (HUMAN), 25%, 50ML: HCPCS

## 2018-03-26 PROCEDURE — 86403 PARTICLE AGGLUT ANTBDY SCRN: CPT

## 2018-03-26 PROCEDURE — 85610 PROTHROMBIN TIME: CPT

## 2018-03-26 PROCEDURE — 85014 HEMATOCRIT: CPT

## 2018-03-26 PROCEDURE — 5A12012 PERFORMANCE OF CARDIAC OUTPUT, SINGLE, MANUAL: ICD-10-PCS | Performed by: EMERGENCY MEDICINE

## 2018-03-26 PROCEDURE — 86920 COMPATIBILITY TEST SPIN: CPT

## 2018-03-26 PROCEDURE — 84540 ASSAY OF URINE/UREA-N: CPT

## 2018-03-26 PROCEDURE — 83605 ASSAY OF LACTIC ACID: CPT

## 2018-03-26 PROCEDURE — 80069 RENAL FUNCTION PANEL: CPT

## 2018-03-26 PROCEDURE — 76705 ECHO EXAM OF ABDOMEN: CPT

## 2018-03-26 PROCEDURE — 93306 TTE W/DOPPLER COMPLETE: CPT

## 2018-03-26 PROCEDURE — 51702 INSERT TEMP BLADDER CATH: CPT

## 2018-03-26 PROCEDURE — 87449 NOS EACH ORGANISM AG IA: CPT

## 2018-03-26 PROCEDURE — 94003 VENT MGMT INPAT SUBQ DAY: CPT

## 2018-03-26 PROCEDURE — 84484 ASSAY OF TROPONIN QUANT: CPT

## 2018-03-26 PROCEDURE — 87040 BLOOD CULTURE FOR BACTERIA: CPT

## 2018-03-26 PROCEDURE — 85007 BL SMEAR W/DIFF WBC COUNT: CPT

## 2018-03-26 PROCEDURE — 87641 MR-STAPH DNA AMP PROBE: CPT

## 2018-03-26 PROCEDURE — 83935 ASSAY OF URINE OSMOLALITY: CPT

## 2018-03-26 PROCEDURE — 85018 HEMOGLOBIN: CPT

## 2018-03-26 PROCEDURE — 86850 RBC ANTIBODY SCREEN: CPT

## 2018-03-26 PROCEDURE — 36430 TRANSFUSION BLD/BLD COMPNT: CPT

## 2018-03-26 PROCEDURE — 36600 WITHDRAWAL OF ARTERIAL BLOOD: CPT

## 2018-03-26 PROCEDURE — 71250 CT THORAX DX C-: CPT

## 2018-03-26 PROCEDURE — 83690 ASSAY OF LIPASE: CPT

## 2018-03-26 PROCEDURE — 93005 ELECTROCARDIOGRAM TRACING: CPT

## 2018-03-26 PROCEDURE — 82805 BLOOD GASES W/O2 SATURATION: CPT

## 2018-03-26 PROCEDURE — 87633 RESP VIRUS 12-25 TARGETS: CPT

## 2018-03-26 PROCEDURE — 87205 SMEAR GRAM STAIN: CPT

## 2018-03-26 PROCEDURE — 96375 TX/PRO/DX INJ NEW DRUG ADDON: CPT

## 2018-03-26 PROCEDURE — 86160 COMPLEMENT ANTIGEN: CPT

## 2018-03-26 PROCEDURE — 80076 HEPATIC FUNCTION PANEL: CPT

## 2018-03-26 PROCEDURE — 76775 US EXAM ABDO BACK WALL LIM: CPT

## 2018-03-26 PROCEDURE — 83880 ASSAY OF NATRIURETIC PEPTIDE: CPT

## 2018-03-26 PROCEDURE — 87070 CULTURE OTHR SPECIMN AEROBIC: CPT

## 2018-03-26 PROCEDURE — 85025 COMPLETE CBC W/AUTO DIFF WBC: CPT

## 2018-03-26 PROCEDURE — 94002 VENT MGMT INPAT INIT DAY: CPT

## 2018-03-26 PROCEDURE — 96368 THER/DIAG CONCURRENT INF: CPT

## 2018-03-26 RX ADMIN — VANCOMYCIN HYDROCHLORIDE SCH MLS/HR: 1 INJECTION, SOLUTION INTRAVENOUS at 15:53

## 2018-03-26 RX ADMIN — DILTIAZEM HYDROCHLORIDE ONE MG: 5 INJECTION INTRAVENOUS at 09:51

## 2018-03-26 RX ADMIN — HUMAN INSULIN SCH: 100 INJECTION, SOLUTION SUBCUTANEOUS at 16:15

## 2018-03-26 RX ADMIN — METHYLPREDNISOLONE SODIUM SUCCINATE SCH MG: 40 INJECTION, POWDER, FOR SOLUTION INTRAMUSCULAR; INTRAVENOUS at 21:36

## 2018-03-26 RX ADMIN — DILTIAZEM HYDROCHLORIDE ONE MG: 5 INJECTION INTRAVENOUS at 10:04

## 2018-03-26 RX ADMIN — HUMAN INSULIN SCH: 100 INJECTION, SOLUTION SUBCUTANEOUS at 20:15

## 2018-03-26 RX ADMIN — IPRATROPIUM BROMIDE AND ALBUTEROL SULFATE SCH AMPULE: .5; 3 SOLUTION RESPIRATORY (INHALATION) at 15:15

## 2018-03-26 RX ADMIN — FAMOTIDINE SCH MG: 10 INJECTION, SOLUTION INTRAVENOUS at 21:36

## 2018-03-26 RX ADMIN — METHYLPREDNISOLONE SODIUM SUCCINATE SCH MG: 40 INJECTION, POWDER, FOR SOLUTION INTRAMUSCULAR; INTRAVENOUS at 15:52

## 2018-03-26 RX ADMIN — HUMAN INSULIN SCH: 100 INJECTION, SOLUTION SUBCUTANEOUS at 12:15

## 2018-03-26 RX ADMIN — FAMOTIDINE SCH MG: 10 INJECTION, SOLUTION INTRAVENOUS at 12:15

## 2018-03-26 RX ADMIN — PROPOFOL PRN MLS/HR: 10 INJECTION, EMULSION INTRAVENOUS at 09:17

## 2018-03-26 RX ADMIN — SODIUM CHLORIDE SCH MLS/HR: 234 INJECTION INTRAMUSCULAR; INTRAVENOUS; SUBCUTANEOUS at 14:00

## 2018-03-26 RX ADMIN — TAZOBACTAM SODIUM AND PIPERACILLIN SODIUM SCH MLS/HR: 500; 4 INJECTION, SOLUTION INTRAVENOUS at 21:00

## 2018-03-26 RX ADMIN — IPRATROPIUM BROMIDE AND ALBUTEROL SULFATE SCH AMPULE: .5; 3 SOLUTION RESPIRATORY (INHALATION) at 22:12

## 2018-03-26 RX ADMIN — TAZOBACTAM SODIUM AND PIPERACILLIN SODIUM SCH MLS/HR: 500; 4 INJECTION, SOLUTION INTRAVENOUS at 15:00

## 2018-03-26 RX ADMIN — STANDARDIZED SENNA CONCENTRATE AND DOCUSATE SODIUM SCH TAB: 8.6; 5 TABLET, FILM COATED ORAL at 21:36

## 2018-03-26 NOTE — PD
HPI


.


Respiratory distress


Chief Complaint:  Respiratory Distress


Time Seen by Provider:  08:38


Travel History


International Travel<30 days:  No


Contact w/Intl Traveler<30days:  No


Traveled to known affect area:  No





History of Present Illness


HPI


This patient is brought to us by [facility] intubated from the nursing home.  No 

history is available from the patient.  History was obtained from [facility] and by 

reviewing old records.  This patient has a history of COPD, CHF, previous non-

STEMI, atrial fibrillation and continued tobacco abuse.  The nursing home staff 

was off for the weekend.  They reported to [facility] that they came in this morning" 

found him this way ".  He had a decreased mental status, tachycardia and 

tachypnea [facility] was called.  On their arrival, they found him to have a heart 

rate of 160 and a respiratory rate of 30.  They report that he was struggling 

to breathe so they elected to intubate him.  His initial end-tidal CO2 was 

about 80.





PFSH


Past Medical History


Hx Anticoagulant Therapy:  Yes


Asthma:  No


Anxiety:  Yes


Depression:  Yes


Heart Rhythm Problems:  Yes (irregular heart beat)


Cancer:  No


Cardiovascular Problems:  Yes


High Cholesterol:  No


Chest Pain:  No


Congestive Heart Failure:  No


COPD:  Yes


Diabetes:  Yes (patient states "borderline" )


Diminished Hearing:  No


Deep Vein Thrombosis:  Yes (PE)


Endocrine:  Yes


GERD:  Yes


Genitourinary:  Yes


Hypertension:  Yes


Immune Disorder:  No


Implanted Vascular Access Dvce:  No


Musculoskeletal:  No


Neurologic:  No


Psychiatric:  Yes


Reproductive:  No


Respiratory:  Yes


Immunizations Current:  No





Past Surgical History


Abdominal Surgery:  Yes (APPENDECTOMY AS A CHILD.)


Appendectomy:  Yes


Tonsillectomy:  Yes


Other Surgery:  Yes





Social History


Alcohol Use:  Yes (ABT 4 - 16 OZ DAILY)


Tobacco Use:  Yes (2 PPD)


Substance Use:  No





Allergies-Medications


(Allergen,Severity, Reaction):  


Coded Allergies:  


     No Known Allergies (Verified  Allergy, Unknown, 2/12/18)


Reported Meds & Prescriptions





Reported Meds & Active Scripts


Active


[Budeson-Formot 160-4.5 Mcg Inh] 60 PUFF Aero 2 Puff INH Q12HR


Cardizem CD 24 HR (Diltiazem CD 24 HR) 180 Mg Caper 180 Mg PO DAILY


Metoprolol Tartrate 25 Mg Tab 25 Mg PO BID


Xarelto (Rivaroxaban) 10 Mg Tab 10 Mg PO BID


Reported


Methylprednisolone 8 Mg Tab 4 Mg PO DAILY


Albuterol Neb (Albuterol Sulfate) 2.5 Mg/0.5 Ml Neb 2.5 Mg NEB Q6HR NEB


     Note: The Albuterol Sulfate Inhalation Solution is concentrated and


     must be diluted. Read complete instructions carefully before using.








Review of Systems


ROS Limitations:  Intubated, Altered Mental Status





Physical Exam


Narrative


GENERAL: Cachectic, chronically ill-appearing man who is currently intubated 

and fighting the ventilator.


SKIN:  warm/dry.  Several bruises including a large hematoma on the right 

medial thigh.


HEAD: Normocephalic.  Atraumatic.


EYES: Pupils equal and round. No scleral icterus. No injection or drainage. 


ENT: No nasal bleeding or discharge.  Mucous membranes are dry.


NECK: Trachea midline.  


CARDIOVASCULAR: Irregular rhythm with a rate of 160-190.


RESPIRATORY: Intubated.  Fighting the tube.  Breath sounds are diminished on 

the left.


GASTROINTESTINAL: Abdomen soft.  Bowel sounds present.  Nondistended.


: Normal male.


MUSCULOSKELETAL: No obvious deformities. 


NEUROLOGICAL: Intubated.


PSYCHIATRIC: Unable to assess.





Data


Data


Last Documented VS





Vital Signs








  Date Time  Temp Pulse Resp B/P (MAP) Pulse Ox O2 Delivery O2 Flow Rate FiO2


 


3/26/18 11:09 102.0 100 20 142/67 (92) 100 Ventilator  50








Orders





 Orders


Ecg Monitoring (3/26/18 08:38)


Blood Pressure (3/26/18 08:38)


Iv Access Insert/Monitor (3/26/18 08:38)


Oximetry (3/26/18 08:38)


Vital Signs (3/26/18 08:38)


Diltiazem Inj (Cardizem Inj) (3/26/18 08:45)


Diltiazem Inj (Cardizem Inj) (3/26/18 08:45)


Sodium Chloride 0.9% Flush (Ns Flush) (3/26/18 08:45)


Propofol 500 Mg/50 Ml Inj (Diprivan 500 (3/26/18 08:40)


Propofol 1000 Mg/100 Ml Inj (Diprivan 10 (3/26/18 08:45)


Electrocardiogram (3/26/18 08:40)


Complete Blood Count With Diff (3/26/18 08:40)


Comprehensive Metabolic Panel (3/26/18 08:40)


Lactic Acid Sepsis Protocol (3/26/18 08:40)


Blood Culture (3/26/18 08:40)


Chest, Single Ap (3/26/18 08:40)


Arterial Blood Gas (Abg) (3/26/18 08:40)


Oxygen Administration (3/26/18 08:40)


Troponin I (3/26/18 08:40)


B-Type Natriuretic Peptide (3/26/18 08:40)


Acetaminophen Supp (Tylenol Supp) (3/26/18 08:45)


Sodium Chlor 0.9% 1000 Ml Inj (Ns 1000 M (3/26/18 09:15)


Sodium Chlor 0.9% 1000 Ml Inj (Ns 1000 M (3/26/18 09:15)


Insert Temp Sensing Haynes Cath (3/26/18 09:11)


Ct Brain W/O Iv Contrast(Rout) (3/26/18 09:19)


Piperacil-Tazo 4.5 Gm Premix (Zosyn 4.5 (3/26/18 09:22)


Azithromycin Inj (Zithromax Inj) (3/26/18 09:22)


Tobramycin Inj (Nebcin Inj) (3/26/18 09:22)


Diltiazem Inj (Cardizem Inj) (3/26/18 09:45)


Urinalysis - C+S If Indicated (3/26/18 10:07)


Sodium Chlor 0.9% 1000 Ml Inj (Ns 1000 M (3/26/18 10:15)


Urine Culture (3/26/18 09:20)


Urinary Catheter Insert/Apply (3/26/18 10:25)


Restraints Non-Violent YANI.Q3H (3/26/18 10:41)


Electrocardiogram (3/26/18 )





Labs





Laboratory Tests








Test


  3/26/18


08:55 3/26/18


09:10 3/26/18


09:15 3/26/18


09:20


 


White Blood Count 23.1 TH/MM3    


 


Red Blood Count 4.61 MIL/MM3    


 


Hemoglobin 10.8 GM/DL    


 


Hematocrit 35.7 %    


 


Mean Corpuscular Volume 77.5 FL    


 


Mean Corpuscular Hemoglobin 23.4 PG    


 


Mean Corpuscular Hemoglobin


Concent 30.2 % 


  


  


  


 


 


Red Cell Distribution Width 24.2 %    


 


Platelet Count 232 TH/MM3    


 


Mean Platelet Volume 9.3 FL    


 


Neutrophils (%) (Auto) 83.8 %    


 


Lymphocytes (%) (Auto) 13.0 %    


 


Monocytes (%) (Auto) 2.9 %    


 


Eosinophils (%) (Auto) 0.2 %    


 


Basophils (%) (Auto) 0.1 %    


 


Neutrophils # (Auto) 19.4 TH/MM3    


 


Lymphocytes # (Auto) 3.0 TH/MM3    


 


Monocytes # (Auto) 0.7 TH/MM3    


 


Eosinophils # (Auto) 0.1 TH/MM3    


 


Basophils # (Auto) 0.0 TH/MM3    


 


CBC Comment AUTO DIFF    


 


Differential Total Cells


Counted 100 


  


  


  


 


 


Neutrophils % (Manual) 73 %    


 


Band Neutrophils % 10 %    


 


Lymphocytes % 10 %    


 


Monocytes % 5 %    


 


Eosinophils % 1 %    


 


Neutrophils # (Manual) 19.4 TH/MM3    


 


Myelocytes 1 %    


 


Differential Comment


  FINAL DIFF


MANUAL 


  


  


 


 


Platelet Estimate NORMAL    


 


Platelet Morphology Comment NORMAL    


 


Tear Drop Cells 1+    


 


Ovalocytes 1+    


 


Blood Urea Nitrogen 27 MG/DL    


 


Creatinine 1.18 MG/DL    


 


Random Glucose 182 MG/DL    


 


Total Protein 6.1 GM/DL    


 


Albumin 3.0 GM/DL    


 


Calcium Level 7.6 MG/DL    


 


Alkaline Phosphatase 75 U/L    


 


Aspartate Amino Transf


(AST/SGOT) 60 U/L 


  


  


  


 


 


Alanine Aminotransferase


(ALT/SGPT) 44 U/L 


  


  


  


 


 


Total Bilirubin 0.6 MG/DL    


 


Sodium Level 141 MEQ/L    


 


Potassium Level 5.3 MEQ/L    


 


Chloride Level 108 MEQ/L    


 


Carbon Dioxide Level 23.0 MEQ/L    


 


Anion Gap 10 MEQ/L    


 


Estimat Glomerular Filtration


Rate 61 ML/MIN 


  


  


  


 


 


Troponin I 0.05 NG/ML    


 


Lactic Acid Level  3.3 mmol/L   


 


Blood Gas Puncture Site   LT BRACHIAL  


 


Blood Gas Patient Temperature   98.6  


 


Blood Gas HCO3   22 mmol/L  


 


Blood Gas Base Excess   -4.3 mmol/L  


 


Blood Gas Oxygen Saturation   97 %  


 


Arterial Blood pH   7.25  


 


Arterial Blood Partial


Pressure CO2 


  


  52 mmHg 


  


 


 


Arterial Blood Partial


Pressure O2 


  


  230 mmHg 


  


 


 


Arterial Blood Oxygen Content   16.4 Vol %  


 


Arterial Blood


Carboxyhemoglobin 


  


  1.3 % 


  


 


 


Arterial Blood Methemoglobin   1.4 %  


 


Blood Gas Hemoglobin   11.7 G/DL  


 


Oxygen Delivery Device   VENTILATOR  


 


Blood Gas Ventilator Setting


  


  


  PRVC//R16/P5


  


 


 


Blood Gas Inspired Oxygen   100 %  


 


Urine Color    YELLOW 


 


Urine Turbidity    HAZY 


 


Urine pH    6.5 


 


Urine Specific Gravity    1.016 


 


Urine Protein    100 mg/dL 


 


Urine Glucose (UA)    NEG mg/dL 


 


Urine Ketones    NEG mg/dL 


 


Urine Occult Blood    TRACE 


 


Urine Nitrite    NEG 


 


Urine Bilirubin    NEG 


 


Urine Urobilinogen


  


  


  


  LESS THAN 2.0


MG/DL


 


Urine Leukocyte Esterase    NEG 


 


Urine RBC    2 /hpf 


 


Urine WBC    15 /hpf 


 


Urine Mucus    FEW /lpf 


 


Microscopic Urinalysis Comment


  


  


  


  CATH-CULTURE


IND











MDM


Medical Decision Making


Medical Screen Exam Complete:  Yes


Emergency Medical Condition:  Yes


Medical Record Reviewed:  Yes (PMH COPD with continued tobacco abuse, AF, CHF, 

previous NSTEMI)


Interpretation(s)


EKG shows atrial fibrillation.  He has ST segment depression and T-wave 

inversion laterally.  However, this is unchanged from a recent EKG. repeat EKG 

following cardiac arrest shows sinus tachycardia with a rate of 104.  He has a 

right bundle branch block.


Differential Diagnosis


Differential diagnosis of dyspnea includes but is not limited to congestive 

heart failure, pneumonia, wheezing, pneumothorax, pulmonary embolism


Narrative Course


This patient presents to us and respiratory failure, intubated per EMS.  He was 

found to have a fever.  He is also in atrial fibrillation with a ventricular 

response of 160-190.





Septic workup labs have been ordered.  He has been placed on a propofol drip 

for sedation.  He has been given a Cardizem bolus and drip for the atrial 

fibrillation with RVR.  He is receiving 2 L of fluid.








ABG








Test


  3/26/18


09:15


 


Arterial Blood


Carboxyhemoglobin 1.3 %  


 


 


Arterial Blood Methemoglobin 1.4 %  


 


Arterial Blood Oxygen Content 16.4 Vol %  


 


Arterial Blood Partial


Pressure CO2 52 mmHg  *H


 


 


Arterial Blood Partial


Pressure O2 230 mmHg  H


 


 


Arterial Blood pH 7.25  *L


 


Blood Gas Base Excess -4.3 mmol/L  L


 


Blood Gas HCO3 22 mmol/L  


 


Blood Gas Hemoglobin 11.7 G/DL  L


 


Blood Gas Inspired Oxygen 100 %  


 


Blood Gas Oxygen Saturation 97 %  


 


Blood Gas Ventilator Setting


  PRVC//R16/P5


 


 


Oxygen Delivery Device VENTILATOR  








The FiO2 was subsequently decreased in the respiratory rate was increased.





CBC Diagram


3/26/18 08:55











CXR:


1. Interval intubation and placement of nasogastric tube.


2. New bilateral airspace disease most characteristic of pulmonary edema.


3. Small left effusion





The patient has subsequently been given Lasix as well as antibiotics for 

presumed HCAP.





The patient has remained tachycardic at about 130.  He has been given a second 

bolus of Cardizem.  He is also receiving IV fluids.  Thus far, he is making 

very little urine.





UA>>15 WBCs but is LE and nitrite neg.





11:07 AM


The patient has been resuscitated from an asystolic arrest.  He was treated 

with epinephrine, atropine, bicarb and calcium chloride.  Please see the code 

sheet for exact times.


Critical Care Narrative


Aggregate critical care time was 60 minutes. Time to perform other separately 

billable procedures was not  


included in the critical care time. My time did not include minutes spent 

treating any other patients simultaneously or on  


activities that did not directly contribute to the patient's treatment.  





The services I provided to this patient were to treat and/or prevent clinically 

significant deterioration due to respiratory failure, AF with RVR, fever





I provided critical care services requiring my management, as noted below:


Chart data review, documentation time, medication orders and management, vital 

sign assessments/reviewing monitor data,  


ordering and reviewing lab tests, ordering and interpreting/reviewing x-rays 

and diagnostic studies, care of the patient  


and discussion of the patient with the admitting physicians





Procedures


**Procedure Narrative**


ACLS:





The patient coded at 10: 52 a.m.  It was an asystolic arrest.  CPR was 

immediately started.  He was treated with epi 3, atropine 2, sodium bicarb 1 

and calcium chloride 1.  He was successfully resuscitated.





Sepsis Criteria


SIRS Criteria (2 or more):  Temp > 100.9 or < 96.8, Heart rate over 90, WBC > 

73960, < 4000 or > 10% bands


Sepsis Criteria (SIRS+source):  Infect source susp/known


Severe Sepsis (+one):  Lactate >2


Criteria Outcome:  Meets SIRS criteria, Meets sepsis criteria, Meets severe 

sepsis criteria





Diagnosis





 Primary Impression:  


 Respiratory failure


 Qualified Codes:  J96.02 - Acute respiratory failure with hypercapnia


 Additional Impressions:  


 Pulmonary edema


 Qualified Codes:  J81.0 - Acute pulmonary edema


 Pneumonia


 Qualified Codes:  J18.9 - Pneumonia, unspecified organism


 Atrial fibrillation with RVR


 Severe sepsis





Admitting Information


Admitting Physician Requests:  Admit


Condition:  Serious











Rima Arreola MD Mar 26, 2018 09:18

## 2018-03-26 NOTE — PD.CONS
Consult


Service


Palliative Care


.


Consult Requested By


Dr. Norton


.


Primary Care Physician


Leif Delgado MD


.


Reason for Consultation


   a.  To assist with evaluation and management of symptoms including:  dyspnea

; encephalopathy


   b.  To assist medical decision maker(s) with: better understanding of 

current medical conditions; weighing benefits/burdens of medical treatment 

options; making  medical treatment decisions.


.





HPI


History of Present Illness


Mr. Guerrero is a 67-year-old male nursing home resident with a known history of 

pulmonary embolus; DVT; atrial fibrillation; CHF; coronary artery disease 

status post non-STEMI; COPD; and hypertension who was sent from his nursing 

facility by the paramedics to [facility] Emergency Department earlier today 

when he was noted to have significant shortness of breath, fever, altered 

mental status, and generalized weakness.  On their arrival, paramedics noted 

the patient to have a heart rate of 160 and respiratory rate of 30.   Due to 

his respiratory distress, paramedics intubated the patient in the field.  

Initial end-tidal CO2 was about 80.





Initial temperature in the emergency department was 103.2.


Initial physical examination by the emergency department clinician noted the 

following patient was cachectic and chronically ill appearing.  He was fighting 

the ventilator.:  Bruising was noted in multiple areas including a large 

hematoma on the right medial thigh.  Pupils were equal and round.  Patient was  

tachycardic with an irregular rate.  Breath sounds were diminished more so on 

the left.  Abdomen was benign.  No other significant abnormalities noted.





Initial diagnostic studies were as follows:


* CBC showed WBC 23.1; hemoglobin 10.8; platelet count 232 there were 10% band 

neutrophils..  


* ABGs on the ventilator at 100% FiO2 showed a pH of 7.25; PCO2 of 52; PO2 of 

230; bicarbonate of 22; base excess -4.3


* Urinalysis was unremarkable


* Troponin was 0.05


* Chem profile showed sodium 141; potassium 5.3; chloride 108; CO2 23; anion 

gap 10; BUN 27; creatinine 1.18; glucose 182; calcium 7.6; GFR 61; lactic acid 

3.3


* Liver function studies showed total bilirubin 0.6; alkaline phosphatase 75; 

AST 60; ALT 44; total protein 6.1; albumin 3.0


* EKG revealed atrial fibrillation.  There were ST segment depressions and T-

wave inversions laterally but this was unchanged from the prior EKG.  A right 

bundle branch block was present.


* Chest x-ray showed bilateral airspace disease most characteristic of 

pulmonary edema.  There is a small left effusion.





The patient was initially given Cardizem to help control his atrial 

fibrillation with RVR.  He received 2 L of fluid.  Septic workup labs were 

ordered.  Lasix was ordered.  Empiric antibiotics were started for presumed 

healthcare associated pneumonia.





While still in the emergency department the patient experienced an asystolic 

cardiac arrest.  He received CPR and he was treated with epi 3, atropine 2, 

sodium bicarb 1 and calcium chloride 1.  He was successfully resuscitated.





Critical care was consulted.


.


Repeat laboratory work shows white blood count going up to 29.8.  Blood glucose 

went down to 14.  Creatinine has increased to 1.46.  Most significant, 

transaminases show evidence of shock liver with AST at 1631 and ALT at 1940.





There is no apparent living will.  We have been unable to contact family.  

Aggressive care continues.


.


Function/Cognitive Trajectory


Patient was recently hospitalized from 2018 through 2018 for 

respiratory distress associated with atrial fibrillation with RVR.  He required 

BiPAP during that admission but avoided mechanical ventilation.





His functional status and trajectory of decline are difficult to ascertain from 

available medical records, and there is no family available.


.





Review of Systems


ROS Limitations:  Clinical Condition (Patient cannot provide his own review of 

systems and there is no family to help with this.  Review of systems is taken 

as best as possible from the medical record.)


Constitutional:  COMPLAINS OF: Fatigue, Fever


Ears, nose, mouth, throat:  DENIES: Hearing loss, Oral lesions


Respiratory:  COMPLAINS OF: Cough, Wheezing, Sputum production, Shortness of 

breath


Cardiovascular:  COMPLAINS OF: Palpitations, DENIES: Chest pain


Gastrointestinal:  COMPLAINS OF: Dyspepsia or heartburn


Musculoskeletal:  COMPLAINS OF: Neck pain


Psychiatric:  COMPLAINS OF: Anxiety, Depression





Past Family Social History


Coded Allergies:  


     No Known Allergies (Verified  Allergy, Unknown, 18)


Past Medical History


Pulmonary embolus


Deep venous thrombosis


Atrial fibrillation


Congestive heart failure


Coronary artery disease


COPD


Hypertension


.


Past Surgical History


Appendectomy


Tonsillectomy


Hernia repair


Inferior vena cava filter placement


Surgery of the right ankle


.


Reported Medications


Prehospitalization medications at the nursing home included the following:





[Budeson-Formot 160-4.5 Mcg Inh] 60 PUFF Aero 2 Puff INH Q12HR


Cardizem CD 24 HR (Diltiazem CD 24 HR) 180 Mg Caper 180 Mg PO DAILY


Metoprolol Tartrate 25 Mg Tab 25 Mg PO BID


Xarelto (Rivaroxaban) 10 Mg Tab 10 Mg PO BID


Methylprednisolone 8 Mg Tab 4 Mg PO DAILY


Albuterol Neb (Albuterol Sulfate) 2.5 Mg/0.5 Ml Neb 2.5 Mg NEB Q6HR NEB


     Note: The Albuterol Sulfate Inhalation Solution is concentrated and


     must be diluted. Read complete instructions carefully before using.


.





Current Medications








 Medications


  (Trade)  Dose


 Ordered  Sig/Vinny


 Route  Start Time


 Stop Time Status Last Admin


 


  (NS Flush)  2 ml  UNSCH  PRN


 IV FLUSH  3/26/18 08:45


     


 


 


 Propofol  100 ml @ 0


 mls/hr  TITRATE  PRN


 IV  3/26/18 08:45


    3/26/18 09:17


 


 


  (Pepcid Inj)  20 mg  Q12HR


 IV PUSH  3/26/18 12:00


    3/26/18 12:15


 


 


  (Duoneb Neb)  1 ampule  Q6HR  NEB


 INH  3/26/18 12:00


    3/26/18 15:15


 


 


 Miscellaneous


 Information  1  Q361D


 XX  3/26/18 12:00


     


 


 


  (Chlorhexidine


 2% Cloth)  3 pack


 Taper  DAILY@04


 TOP  3/27/18 04:00


 3/23/19 03:59   


 


 


  (Chlorhexidine


 2% Cloth)  3 pack  UNSCH  PRN


 TOP  3/26/18 12:00


     


 


 


  (Janice-Colace)  1 tab  BID


 PO  3/26/18 21:00


     


 


 


  (Milk Of


 Magnesia Liq)  30 ml  Q12H  PRN


 PO  3/26/18 12:00


     


 


 


  (Senokot)  17.2 mg  Q12H  PRN


 PO  3/26/18 12:00


     


 


 


  (Dulcolax Supp)  10 mg  DAILY  PRN


 RECTAL  3/26/18 12:00


     


 


 


  (Lactulose Liq)  30 ml  DAILY  PRN


 PO  3/26/18 12:00


     


 


 


  (SoluMEDROL INJ)  40 mg  Q8HR


 IV PUSH  3/26/18 14:00


    3/26/18 15:52


 


 


 Vancomycin HCl


 1000 mg/Sodium


 Chloride  250 ml @ 


 250 mls/hr  Q12H


 IV  3/26/18 14:00


    3/26/18 15:53


 


 


 Piperacillin Sod/


 Tazobactam Sod  100 ml @ 


 200 mls/hr  Q6H


 IV  3/26/18 15:00


     


 


 


 Azithromycin 500


 mg/Sodium Chloride  250 ml @ 


 250 mls/hr  Q24H


 IV  3/27/18 10:00


     


 


 


  (D50w (Vial) Inj)  50 ml  UNSCH  PRN


 IV PUSH  3/26/18 12:15


    3/26/18 13:50


 


 


  (Glucagon Inj)  1 mg  UNSCH  PRN


 OTHER  3/26/18 12:15


     


 


 


  (NovoLIN R


 SUPPLEMENTAL


 SCALE)  1  Q4H


 SQ  3/26/18 12:15


     


 


 


 Dextrose  1,000 ml @ 


 42 mls/hr  [id number]


 IV  3/26/18 14:00


    3/26/18 14:00


 





.


Family History


Father  of lung cancer.


No history of DVT or pulmonary embolus in the family.


.


Substance Use


Tobacco: Greater than 40-pack-year history of smoking and has been smoking 1 

pack per day up until very recently.


Alcohol: History of abuse.


Prescription med abuse: No known prescription drug abuse.


Illicits: No known use of illicits


.


Psychosocial History


Born in [place]





Has not worked for at least 7 years.  He used to do [employer] work.





 twice.  


His contacts include 1 daughter --Edilia Guerrero --who we believe lives in 

[facility].  We have been unable to contact her.  Uncertain if there are 

other children.


Records indicate the patient also has a brother and 3 sisters.  Contact 

information includes a sister who lives locally.  We have not been able to 

contact her.


.


Spiritual/Cultural Factors


No reported Holiness in the electronic medical record.


.


Living Will:  Never completed


Health Care Surrogate:  Never completed


Durable Power of :  Never completed


Date completed:


Patient has not completed an advanced directive prior records.


.


Health Care Surrogate(s):


There is been no written designation of healthcare surrogate per our records.


.


Documented care wishes:


There is no written documentation of healthcare wishes or preferences.


.


Today's verbally stated goals:


Patient is unable to verbally state his own healthcare preferences or goals.  

At this point it is unlikely he will regain capacity to do so.


.


Family/friends goals:


We have been unable to contact any family or friends to provide guidance 

regarding patient's goals/preferences for health care.


.


Ethical and Legal Issues


Patient is currently incapacitated at it currently appears quite unlikely that 

he will regain capacity.  We have not yet been able to locate a healthcare 

decision maker.


.





Physical Exam





Vital Signs








  Date Time  Temp Pulse Resp B/P (MAP) Pulse Ox O2 Delivery O2 Flow Rate FiO2


 


3/26/18 19:05  110 40 121/78 (92) 90   


 


3/26/18 19:03  104 34 41/26 (31) 90   


 


3/26/18 18:59  110 33 44/12 (23) 90   


 


3/26/18 18:45  106      


 


3/26/18 18:45  104 36 106/81 (89) 91   


 


3/26/18 18:30  106 43 114/75 (88) 94   


 


3/26/18 17:56     95   100


 


3/26/18 17:45  106      


 


3/26/18 17:45        100


 


3/26/18 17:45 99.2 104 38 208/92 (130) 91   


 


3/26/18 16:32 99.7 122 24 96/69 (78) 98 Ventilator  


 


3/26/18 16:13 99.5 117 22 119/53 (75) 96 Ventilator  


 


3/26/18 15:37 99.1 114 18 90/92 (91) 95 Ventilator  100


 


3/26/18 15:15     94   100


 


3/26/18 14:44 98.6 115 18 131/62 (85) 91 Ventilator  


 


3/26/18 14:21  110 16 106/59 (75) 93 Ventilator  


 


3/26/18 13:50 98.2 101  109/59 (76) 94 Ventilator  


 


3/26/18 13:40     99   100


 


3/26/18 13:00 98.2 103 20 134/68 (90) 100 Ventilator  50


 


3/26/18 12:00 99.1 108 18 123/67 (85) 100 Ventilator  50


 


3/26/18 11:31 101.1 101 18 101/59 (73) 100 Ventilator  50


 


3/26/18 11:25 101.6 98 18 99/71 (80) 98 Ventilator  50


 


3/26/18 11:19 101.8 106 20 101/68 (79) 100 Ventilator  50


 


3/26/18 11:09 102.0 100 20 142/67 (92) 100 Ventilator  50


 


3/26/18 10:30 102.6 102 20 139/83 (101) 100 Ventilator  50


 


3/26/18 10:23 102.3 115 22 110/68 (82) 100 Ventilator  50


 


3/26/18 10:08 102.2 114 24 159/68 (98) 98 Ventilator  50


 


3/26/18 09:58 102.1    (97) 100 Ventilator  50


 


3/26/18 09:37  129  59/68    


 


3/26/18 09:35        50


 


3/26/18 09:34 101.3 133 28 148/72 (97) 100 Ventilator  100


 


3/26/18 09:17  155  150/88    


 


3/26/18 09:05  162  148/89    


 


3/26/18 08:55  156 38 148/89 (108) 100 Ventilator  


 


3/26/18 08:49     100   100


 


3/26/18 08:40     100 Ventilator  100


 


3/26/18 08:40        100


 


3/26/18 08:40   38  100 Ventilator  100


 


3/26/18 08:35  171 38  100 Ventilator  100


 


3/26/18 08:35 103.1 168 38 199/110 (139)    





.











 3/26/18 3/27/18





 19:00 07:00


 


Intake Total 3550 ml 


 


Balance 3550 ml 


 


  


 


Intake IV Total 3550 ml 





.


Exam


CONSTITUTIONAL/GENERAL: This is an adequately nourished patient tachypneic and 

laboring on the ventilator.  He is quite pale.  He is unresponsive.


TUBES/LINES/DRAINS: Orotracheal tube; orogastric tube; Haynes catheter; soft 

wrist restraints; external jugular line; peripheral IVs


SKIN: No jaundice, rashes.  There is a large bruise on the inner right thigh.  

Other smaller ecchymoses on the extremities. . No wounds seen anteriorly. Skin 

temperature appropriate. Not diaphoretic. 


HEAD: Atraumatic. Normocephalic.


EYES: Pupils equal and round and non--reactive.  Unable to assess extraocular 

movements.  No scleral icterus. No injection or drainage. Fundi not examined.


ENT: Unable to assess hearing.. Nose without bleeding or purulent drainage. 

Throat without visible erythema, exudates, masses, or lesions though difficult 

to adequately examine due to intubations. 


NECK: Trachea midline.  No palpable thyroid enlargement or nodularity. 


CARDIOVASCULAR: Tachycardic, irregular rhythm without murmurs, gallops, or 

rubs. No JVD. Peripheral pulses symmetric.


RESPIRATORY/CHEST: Symmetric, rapid, labored respirations.  Very little air 

movement bilaterally.  No wheezes, rales, or rhonchi.  


GASTROINTESTINAL: Abdomen soft, distended. No hepato-splenomegaly, or palpable 

masses. No guarding. Bowel sounds hypoactive.


GENITOURINARY: Without palpable bladder distension. Haynes catheter in place.


MUSCULOSKELETAL: Extremities without clubbing, cyanosis, or edema.  Slight 

mottling of the feet is noted.


LYMPHATICS: No palpable cervical or supraclavicular adenopathy.


NEUROLOGICAL: Unresponsive.  No spontaneous movements noted.  Is not 

withdrawing to noxious stimuli.  Pupils are nonreactive.


PSYCHIATRIC: Unable to assess due to level of responsiveness.


.





Diagnostic Tests


Laboratory





Laboratory Tests








Test


  3/26/18


08:55 3/26/18


09:10 3/26/18


09:15 3/26/18


09:20


 


White Blood Count


  23.1 TH/MM3


(4.0-11.0) 


  


  


 


 


Red Blood Count


  4.61 MIL/MM3


(4.50-5.90) 


  


  


 


 


Hemoglobin


  10.8 GM/DL


(13.0-17.0) 


  


  


 


 


Hematocrit


  35.7 %


(39.0-51.0) 


  


  


 


 


Mean Corpuscular Volume


  77.5 FL


(80.0-100.0) 


  


  


 


 


Mean Corpuscular Hemoglobin


  23.4 PG


(27.0-34.0) 


  


  


 


 


Mean Corpuscular Hemoglobin


Concent 30.2 %


(32.0-36.0) 


  


  


 


 


Red Cell Distribution Width


  24.2 %


(11.6-17.2) 


  


  


 


 


Platelet Count


  232 TH/MM3


(150-450) 


  


  


 


 


Mean Platelet Volume


  9.3 FL


(7.0-11.0) 


  


  


 


 


Neutrophils (%) (Auto)


  83.8 %


(16.0-70.0) 


  


  


 


 


Lymphocytes (%) (Auto)


  13.0 %


(9.0-44.0) 


  


  


 


 


Monocytes (%) (Auto)


  2.9 %


(0.0-8.0) 


  


  


 


 


Eosinophils (%) (Auto)


  0.2 %


(0.0-4.0) 


  


  


 


 


Basophils (%) (Auto)


  0.1 %


(0.0-2.0) 


  


  


 


 


Neutrophils # (Auto)


  19.4 TH/MM3


(1.8-7.7) 


  


  


 


 


Lymphocytes # (Auto)


  3.0 TH/MM3


(1.0-4.8) 


  


  


 


 


Monocytes # (Auto)


  0.7 TH/MM3


(0-0.9) 


  


  


 


 


Eosinophils # (Auto)


  0.1 TH/MM3


(0-0.4) 


  


  


 


 


Basophils # (Auto)


  0.0 TH/MM3


(0-0.2) 


  


  


 


 


CBC Comment AUTO DIFF    


 


Differential Total Cells


Counted 100 


  


  


  


 


 


Neutrophils % (Manual) 73 % (16-70)    


 


Band Neutrophils % 10 % (0-6)    


 


Lymphocytes % 10 % (9-44)    


 


Monocytes % 5 % (0-8)    


 


Eosinophils % 1 % (0-4)    


 


Neutrophils # (Manual)


  19.4 TH/MM3


(1.8-7.7) 


  


  


 


 


Myelocytes 1 % (0-0)    


 


Differential Comment


  FINAL DIFF


MANUAL 


  


  


 


 


Platelet Estimate


  NORMAL


(NORMAL) 


  


  


 


 


Platelet Morphology Comment


  NORMAL


(NORMAL) 


  


  


 


 


Tear Drop Cells 1+ (NORMAL)    


 


Ovalocytes 1+ (NORMAL)    


 


Blood Urea Nitrogen


  27 MG/DL


(7-18) 


  


  


 


 


Creatinine


  1.18 MG/DL


(0.60-1.30) 


  


  


 


 


Random Glucose


  182 MG/DL


() 


  


  


 


 


Total Protein


  6.1 GM/DL


(6.4-8.2) 


  


  


 


 


Albumin


  3.0 GM/DL


(3.4-5.0) 


  


  


 


 


Calcium Level


  7.6 MG/DL


(8.5-10.1) 


  


  


 


 


Alkaline Phosphatase


  75 U/L


() 


  


  


 


 


Aspartate Amino Transf


(AST/SGOT) 60 U/L (15-37) 


  


  


  


 


 


Alanine Aminotransferase


(ALT/SGPT) 44 U/L (12-78) 


  


  


  


 


 


Total Bilirubin


  0.6 MG/DL


(0.2-1.0) 


  


  


 


 


Sodium Level


  141 MEQ/L


(136-145) 


  


  


 


 


Potassium Level


  5.3 MEQ/L


(3.5-5.1) 


  


  


 


 


Chloride Level


  108 MEQ/L


() 


  


  


 


 


Carbon Dioxide Level


  23.0 MEQ/L


(21.0-32.0) 


  


  


 


 


Anion Gap


  10 MEQ/L


(5-15) 


  


  


 


 


Estimat Glomerular Filtration


Rate 61 ML/MIN


(>89) 


  


  


 


 


Troponin I


  0.05 NG/ML


(0.02-0.05) 


  


  


 


 


B-Type Natriuretic Peptide


  534 PG/ML


(0-100) 


  


  


 


 


Lactic Acid Level


  


  3.3 mmol/L


(0.4-2.0) 


  


 


 


Blood Gas Puncture Site   LT BRACHIAL  


 


Blood Gas Patient Temperature   98.6  


 


Blood Gas HCO3


  


  


  22 mmol/L


(22-26) 


 


 


Blood Gas Base Excess


  


  


  -4.3 mmol/L


(-2-2) 


 


 


Blood Gas Oxygen Saturation   97 % ()  


 


Arterial Blood pH


  


  


  7.25


(7.380-7.420) 


 


 


Arterial Blood Partial


Pressure CO2 


  


  52 mmHg


(38-42) 


 


 


Arterial Blood Partial


Pressure O2 


  


  230 mmHg


() 


 


 


Arterial Blood Oxygen Content


  


  


  16.4 Vol %


(12.0-20.0) 


 


 


Arterial Blood


Carboxyhemoglobin 


  


  1.3 % (0-4) 


  


 


 


Arterial Blood Methemoglobin   1.4 % (0-2)  


 


Blood Gas Hemoglobin


  


  


  11.7 G/DL


(12.0-16.0) 


 


 


Oxygen Delivery Device   VENTILATOR  


 


Blood Gas Ventilator Setting


  


  


  PRVC//R16/P5


  


 


 


Blood Gas Inspired Oxygen   100 %  


 


Urine Color


  


  


  


  YELLOW


(YELLW/STRAW)


 


Urine Turbidity    HAZY (CLEAR) 


 


Urine pH    6.5 (5.0-8.5) 


 


Urine Specific Gravity


  


  


  


  1.016


(1.002-1.035)


 


Urine Protein


  


  


  


  100 mg/dL


(NEG-TRACE)


 


Urine Glucose (UA)


  


  


  


  NEG mg/dL


(NEG)


 


Urine Ketones


  


  


  


  NEG mg/dL


(NEG)


 


Urine Occult Blood    TRACE (NEG) 


 


Urine Nitrite    NEG (NEG) 


 


Urine Bilirubin    NEG (NEG) 


 


Urine Urobilinogen


  


  


  


  LESS THAN 2.0


MG/DL (LESS


 


Urine Leukocyte Esterase    NEG (NEG) 


 


Urine RBC    2 /hpf (0-3) 


 


Urine WBC    15 /hpf (0-5) 


 


Urine Mucus    FEW /lpf (OCC) 


 


Microscopic Urinalysis Comment


  


  


  


  CATH-CULTURE


IND


 


Test


  3/26/18


11:32 3/26/18


12:48 3/26/18


16:05 3/26/18


18:40


 


Lactic Acid Level


  4.7 mmol/L


(0.4-2.0) 


  


  


 


 


White Blood Count


  


  29.8 TH/MM3


(4.0-11.0) 


  


 


 


Red Blood Count


  


  5.13 MIL/MM3


(4.50-5.90) 


  


 


 


Hemoglobin


  


  11.9 GM/DL


(13.0-17.0) 


  


 


 


Hematocrit


  


  40.3 %


(39.0-51.0) 


  


 


 


Mean Corpuscular Volume


  


  78.7 FL


(80.0-100.0) 


  


 


 


Mean Corpuscular Hemoglobin


  


  23.2 PG


(27.0-34.0) 


  


 


 


Mean Corpuscular Hemoglobin


Concent 


  29.4 %


(32.0-36.0) 


  


 


 


Red Cell Distribution Width


  


  24.2 %


(11.6-17.2) 


  


 


 


Platelet Count


  


  218 TH/MM3


(150-450) 


  


 


 


Mean Platelet Volume


  


  8.9 FL


(7.0-11.0) 


  


 


 


Neutrophils (%) (Auto)


  


  93.6 %


(16.0-70.0) 


  


 


 


Lymphocytes (%) (Auto)


  


  4.6 %


(9.0-44.0) 


  


 


 


Monocytes (%) (Auto)


  


  1.2 %


(0.0-8.0) 


  


 


 


Eosinophils (%) (Auto)


  


  0.1 %


(0.0-4.0) 


  


 


 


Basophils (%) (Auto)


  


  0.5 %


(0.0-2.0) 


  


 


 


Neutrophils # (Auto)


  


  27.9 TH/MM3


(1.8-7.7) 


  


 


 


Lymphocytes # (Auto)


  


  1.4 TH/MM3


(1.0-4.8) 


  


 


 


Monocytes # (Auto)


  


  0.4 TH/MM3


(0-0.9) 


  


 


 


Eosinophils # (Auto)


  


  0.0 TH/MM3


(0-0.4) 


  


 


 


Basophils # (Auto)


  


  0.1 TH/MM3


(0-0.2) 


  


 


 


CBC Comment  AUTO DIFF   


 


Differential Total Cells


Counted 


  100 


  


  


 


 


Neutrophils % (Manual)  78 % (16-70)   


 


Band Neutrophils %  15 % (0-6)   


 


Lymphocytes %  6 % (9-44)   


 


Monocytes %  1 % (0-8)   


 


Neutrophils # (Manual)


  


  27.7 TH/MM3


(1.8-7.7) 


  


 


 


Nucleated Red Blood Cells


  


  1 /100 WBC


(0-0) 


  


 


 


Differential Comment


  


  FINAL DIFF


MANUAL 


  


 


 


Platelet Estimate


  


  NORMAL


(NORMAL) 


  


 


 


Platelet Morphology Comment


  


  NORMAL


(NORMAL) 


  


 


 


Polychromasia


  


  2.2 %


(0.0-1.9) 


  


 


 


Ovalocytes  1+ (NORMAL)   


 


Blood Urea Nitrogen


  


  25 MG/DL


(7-18) 28 MG/DL


(7-18) 


 


 


Creatinine


  


  1.19 MG/DL


(0.60-1.30) 1.46 MG/DL


(0.60-1.30) 


 


 


Random Glucose


  


  14 MG/DL


() 31 MG/DL


() 


 


 


Total Protein


  


  5.2 GM/DL


(6.4-8.2) 5.0 GM/DL


(6.4-8.2) 


 


 


Albumin


  


  2.4 GM/DL


(3.4-5.0) 2.3 GM/DL


(3.4-5.0) 


 


 


Calcium Level


  


  7.6 MG/DL


(8.5-10.1) 7.7 MG/DL


(8.5-10.1) 


 


 


Alkaline Phosphatase


  


  104 U/L


() 106 U/L


() 


 


 


Aspartate Amino Transf


(AST/SGOT) 


  1121 U/L


(15-37) 1631 U/L


(15-37) 


 


 


Alanine Aminotransferase


(ALT/SGPT) 


  1141 U/L


(12-78) 1940 U/L


(12-78) 


 


 


Total Bilirubin


  


  1.4 MG/DL


(0.2-1.0) 1.3 MG/DL


(0.2-1.0) 


 


 


Sodium Level


  


  147 MEQ/L


(136-145) 146 MEQ/L


(136-145) 


 


 


Potassium Level


  


  5.5 MEQ/L


(3.5-5.1) 4.8 MEQ/L


(3.5-5.1) 


 


 


Chloride Level


  


  116 MEQ/L


() 116 MEQ/L


() 


 


 


Carbon Dioxide Level


  


  21.5 MEQ/L


(21.0-32.0) 20.9 MEQ/L


(21.0-32.0) 


 


 


Anion Gap


  


  10 MEQ/L


(5-15) 9 MEQ/L (5-15) 


  


 


 


Estimat Glomerular Filtration


Rate 


  61 ML/MIN


(>89) 48 ML/MIN


(>89) 


 


 


Prothrombin Time


  


  


  12.4 SEC


(9.8-11.6) 


 


 


Prothromb Time International


Ratio 


  


  1.2 RATIO 


  


 


 


Hepatitis A IgM Antibody


  


  


  NONREACTIVE


(NONREACTIVE) 


 


 


Hepatitis B Surface Antigen


  


  


  NONREACTIVE


(NONREACTIVE) 


 


 


Hepatitis B Core IgM Antibody


  


  


  NONREACTIVE


(NONREACTIVE) 


 


 


Hepatitis C IgG Antibody


  


  


  NONREACTIVE


(NONREACTIVE) 


 


 


Blood Gas Puncture Site    RT FEMORAL 


 


Blood Gas Patient Temperature    37.0 


 


Blood Gas HCO3


  


  


  


  19 mmol/L


(22-26)


 


Blood Gas Base Excess


  


  


  


  -9.1 mmol/L


(-2-2)


 


Blood Gas Oxygen Saturation    90 % () 


 


Arterial Blood pH


  


  


  


  7.09


(7.380-7.420)


 


Arterial Blood Partial


Pressure CO2 


  


  


  66 mmHg


(38-42)


 


Arterial Blood Partial


Pressure O2 


  


  


  84 mmHg


()


 


Arterial Blood Oxygen Content


  


  


  


  15.1 Vol %


(12.0-20.0)


 


Arterial Blood


Carboxyhemoglobin 


  


  


  1.1 % (0-4) 


 


 


Arterial Blood Methemoglobin    1.5 % (0-2) 


 


Blood Gas Hemoglobin


  


  


  


  11.8 G/DL


(12.0-16.0)


 


Oxygen Delivery Device


  


  


  


  VENT PRVC/AC


MODE


 


Blood Gas Ventilator Setting


  


  


  


  [id number]//[id number]


 


 


Blood Gas Inspired Oxygen    100 % 





.


Result Diagram:  


3/26/18 1248                                                                   

             3/26/18 2255





Microbiology





Microbiology








 Date/Time


Source Procedure


Growth Status


 


 


 3/26/18 08:55


Blood Peripheral Aerobic Blood Culture


Pending Received


 


 3/26/18 08:55


Blood Peripheral Anaerobic Blood Culture


Pending Received





 3/26/18 08:40


Blood Peripheral Aerobic Blood Culture


Pending Received


 


 3/26/18 08:40


Blood Peripheral Anaerobic Blood Culture


Pending Received


 


 3/26/18 12:48


Nasal Aspirate Influenza Types A,B Antigen (STEPHANIE) - Final


NEGATIVE FOR FLU A AND B ANTIGEN.... Complete


 


 3/26/18 09:20


Urine Catheterized Urine Urine Culture


Pending Received





.


Imaging





Last Impressions








Chest X-Ray 3/26/18 0840 Signed





Impressions: 





 Service Date/Time:  2018 08:53 - CONCLUSION:  1. Interval 





 intubation and placement of nasogastric tube. 2. New bilateral airspace 

disease 





 most characteristic of pulmonary edema. 3. Small left effusion.     Rob Marshall MD 





.


Procedures


* Intubation/mechanical ventilation.


.





Patient/Family Conference


Present at Family Conference:


Unable to contact family members.


.


Family Conference Time (mins):  0


Issues Discussed:








Assessment and Plan


Disease Oriented Problem List:  


(1) Cardiac arrest


(2) Respiratory failure


(3) Shock liver


(4) COPD with exacerbation


(5) Severe sepsis


(6) Acute kidney injury


(7) Atrial fibrillation with RVR


(8) Pulmonary edema


(9) CHF (congestive heart failure)


(10) Pneumonia


(11) Hypoalbuminemia


Symptom Scale:  


(1) Pain


0-10 Scale:  Unable to quantify


Comment:  Unclear if patient had prehospitalization pain syndromes.  Current 

sources of pain might include recent chest compressions; orotracheal and 

orogastric intubations; Haynes catheter; venous access catheters; restraints; 

prolonged bedbound status.


.





(2) Dyspnea


0-10 Scale:  Unable to quantify


Comment:  Dyspnea probably secondary to a combination of underlying COPD; 

tachycardia; and congestive heart failure.  Dyspnea currently managed on the 

ventilator.


.





(3) Encephalopathy


0-10 Scale:  Unable to quantify





Pertinent Non-Medical Issues


Psychosocial: Patient was a resident of [facility].  We have not 

been able to locate family or friends.


Spiritual: Records do not include any Protestant affiliation.  Uncertain if 

Holiness or spirituality played an important role in this patient's life.


Legal: No advance directives available to us.  No known healthcare surrogate.  

Have not been able to locate a proxy healthcare decision maker.


Ethical issues impacting care: Patient is incapacitated.  It appears unlikely 

at this time that he will regain capacity to make his own healthcare decisions.


.


Important Contacts


Edilia Guerrero (daughter) [phone]





Katelynn Womack (sister) [phone]


.


Prognosis


Patient has multiple underlying comorbidities.  He came in in respiratory 

distress and suffered a asystolic cardiac arrest.  He is now going into shock 

liver.  Kidney function is declining.  Prognosis is poor at this time.  It is 

uncertain if patient will survive this hospitalization.  If he does survive the 

hospitalization it is unclear what type of quality of life he will return to.  

Prognostication would also be helped if I was clear about his functional status 

prior to this event.


.


Code Status:  Full Code


Plan


==  Code Status: FULL CODE  -- pt will need to remain full code until we are 

able to find a legal health care decision maker. 





== Decision Making: Patient is incapacitated to make his own healthcare 

decisions.  At this point it is very unlikely that he will recover capacity to 

do so.. There is no known written designation of healthcare surrogate.  Records 

seem to indicate patient is .  We have a telephone number for a 

daughter but there is no answer and voicemail is full.  Proxy decision making 

would fall to the majority of adult children.  If there are no adult children 

or the choose not to participate I believe there are some siblings that would 

be next in line.  Finding a healthcare decision maker will be a high priority.  

If there are no family or friends available, we will need to consider bringing 

in [employer].





== Goals medical treatment: Without a living will, with patient being 

incapacitated, and without legal healthcare decision makers, goals must be 

aggressive at this point.





== Symptoms:


* Pain: Unclear if patient had prehospitalization pain syndromes.  Current 

sources of pain might include recent chest compressions; orotracheal and 

orogastric intubations; Haynes catheter; venous access catheters; restraints; 

prolonged bedbound status.  There are no current orders for opioids.  Sedating 

medicines are probably being minimized at this time with the hopes of improving 

respiratory status.  No recommendations at this time.


* Dyspnea:   probably secondary to a combination of underlying COPD; tachycardia

; and congestive heart failure.  Dyspnea currently managed on the ventilator.  

No additional recommendations at this time.


* Encephalopathy:  Probably metabolic and multi-factorial .  This will probably 

improve if we can correct some of the underlying metabolic issues.  No further 

recommendations at this time.





== Palliative care will assist in trying to locate and contact family members 

or friends who might be able to serve as healthcare proxy.  Should none be found

, we will need to consider bringing [employer] in to serve as proxy.





== Palliative care will try to get more information on prehospitalization 

functional status.  This might help with prognostication.  





== Palliative care will continue to follow to assist with symptom management 

and to further clarify goals of medical treatment as the clinical course 

evolves.


.





Thank you for the opportunity to participate in the care of Mr. Guerrero.


.





Attestation


To help prompt me to consider important information that might be impacting 

today's encounter and assessment, information from prior notes written by 

myself or my colleagues may have been "brought forward" into today's note.  My 

signature on this note, however, is an attestation that I personally performed 

the exam, history, and/or decision-making noted today, and, unless otherwise 

indicated, the interactions with patient, family, and staff as well as the 

review of records all occurred today.  I also attest that the listed assessment 

and stated plan reflect my best clinical judgment today based on the 

combination of historical information, prior notes, and today's exam/ 

interactions.  When time spent is documented, it refers only to time spent 

today by the signer, or if indicated, combined time spent today by 

collaborating physician/nurse practitioner.


.











Deonte Carrizales MD Mar 26, 2018 21:01

## 2018-03-26 NOTE — RADRPT
EXAM DATE/TIME:  03/26/2018 15:10 

 

[facility] COMPARISON:     

CHEST SINGLE AP, March 26, 2018, 8:53.

 

                     

INDICATIONS :     

Evaluate short of breath. Apparent pulmonary edema on chest CT. Followup infiltrates.

                     

 

MEDICAL HISTORY :            

Hypertension. Chronic obstructive pulmonary disease. Deep venous thrombosis   

 

SURGICAL HISTORY :     

Appendectomy.   

 

ENCOUNTER:     

Initial                                        

 

ACUITY:     

1 day      

 

PAIN SCORE:     

Non-responsive.

 

LOCATION:     

Bilateral chest 

 

FINDINGS:     

A single AP portable semierect view of the chest was obtained and again demonstrates the endotracheal
 tube in place with the tip now located approximately 2.7 cm above the savana. Nasogastric tube is ag
ain seen coursing through the esophagus and stomach. Hazy perihilar and bibasilar opacities are again
 noted with mild increased consolidation in the perihilar region on the right. The heart size remains
 at the upper limits of normal. Both costophrenic angles are blunted. Overlying oxygen tubing and denzel
ctrocardiogram leads are present. There are degenerative changes in both glenohumeral joints.

 

CONCLUSION:     

1. Endotracheal tube tip now lies approximately 2.7 cm above the savana.

2. Mild decrease in confluency of the alveolar opacities in the right perihilar region.

3. Small effusions again noted.

 

 

 

 Rob Marshall MD on March 26, 2018 at 15:49           

Board Certified Radiologist.

 This report was verified electronically.

## 2018-03-26 NOTE — RADRPT
EXAM DATE/TIME:  03/26/2018 16:24 

 

[facility] COMPARISON:     

No previous studies available for comparison.

        

 

 

INDICATIONS :     

Increased lab values. 

                     

 

MEDICAL HISTORY :     

Deep venous thrombosis. Hypertension. Chronic obstructive pulmonary disease. Hearing loss. Cardiac di
sorders. Irregular heartbeat. Pulmonary embolism. Dyspnea. GERD. Renal disease. Diabetes. Depression.
 Tobacco use. Anxiety. Anticoagulant therapy. 

 

SURGICAL HISTORY :     

Tonsillectomy. Appendectomy.   Right ankle surgery. 

 

ENCOUNTER:     

Initial

 

ACUITY:     

1 day

 

PAIN SCORE:     

Nonresponsive.

 

LOCATION:     

Bilateral upper quadrant 

                     

MEASUREMENTS:     

 

LIVER:     

15.7 cm length 

 

COMMON DUCT:     

3 mm

 

RIGHT KIDNEY:     

9.5 x 3.9 x 3.9 cm

 

SPLEEN:     

9.4 cm length

 

FINDINGS:     

 

LIVER:     

Normal echotexture without focal lesion or ductal dilatation. Portal vein is patent.  

 

COMMON DUCT:     

No intraluminal mass or stone visualized.  

 

GALLBLADDER:     

Gallbladder is mildly distended. There are no internal echoes. Minimal pericholecystic fluid is ident
ified.

 

PANCREAS:     

Not well visualized.

 

RIGHT KIDNEY:     

No hydronephrosis, stone or mass.  

 

SPLEEN:     

No focal lesion.  Small amount of fluid is identified surrounding the spleen.

 

CONCLUSION:     

1. Minimal free fluid in the abdomen

2. Mildly distended gallbladder without evidence of cholelithiasis.

3. No evidence of biliary obstructive disease or focal liver abnormality.

 

 

 

 Lencho Montes MD on March 26, 2018 at 16:30           

Board Certified Radiologist.

 This report was verified electronically.

## 2018-03-26 NOTE — RADRPT
EXAM DATE/TIME:  03/26/2018 08:53 

 

[facility] COMPARISON:     

CHEST SINGLE AP, February 12, 2018, 5:51.

 

                     

INDICATIONS :     

Unresponsive shortness of breath. Patient is status post intubation.

                     

 

MEDICAL HISTORY :     

None.          

 

SURGICAL HISTORY :     

None.   

 

ENCOUNTER:     

Initial                                        

 

ACUITY:     

1 day      

 

PAIN SCORE:     

Non-responsive.

 

LOCATION:     

Bilateral chest 

 

FINDINGS:     

A single AP supine view of the chest was obtained and demonstrates interval intubation with the endot
vivienne tube tip approximately 1 cm above the savana. A nasogastric tube has been placed and is seen 
coursing through the esophagus and into the stomach. There are new bilateral perihilar and bibasal ar
e airspace opacities. There is mild blunting of the left costophrenic angle again noted. The heart si
ze is at the upper limits of normal. Degenerative changes again noted in both glenohumeral joints.

 

CONCLUSION:     

1. Interval intubation and placement of nasogastric tube.

2. New bilateral airspace disease most characteristic of pulmonary edema.

3. Small left effusion.

 

 

 

 Rob Marshall MD on March 26, 2018 at 9:12           

Board Certified Radiologist.

 This report was verified electronically.

## 2018-03-26 NOTE — MH
cc:

Reji Norton MD

****

 

 

DATE OF ADMISSION:

03/26/2018

 

HISTORY OF PRESENT ILLNESS:

The patient is a 67-year-old male with a past medical history of COPD,

CHF, hypertension, history of PE and DVT on Xarelto, and coronary 

artery disease, who was brought in by [facility] from the nursing home for 

generalized weakness, shortness of breath, fever and altered mental 

status.  In the ED, the patient was found to have a temperature of 

103.2 rectally.  On arrival, he was in atrial fibrillation with RVR 

and due to respiratory distress, the patient was intubated by [facility].  

The patient was placed on a Diprivan for sedation.  In addition, he 

was placed on Cardizem drip at 15 mg an hour.  Shortly after, the 

patient went into asystole.  ACLS protocol was initiated and he 

received epinephrine, atropine, bicarbonate, and calcium with 

successful return of spontaneous circulation.

 

LABORATORY DATA:

Significant for leukocytosis with a WBC of 23.1, lactic acidemia, 

lactic acid level of 3.3 and 4.7 post-code.

 

A chest x-ray showed bilateral airspace disease.  He was given 1 liter

bolus of normal saline along with Zosyn, azithromycin and tobramycin. 

The patient received 3 liters of crystalloids in the ED.

 

ABG post-intubation showed acute hypercapnic respiratory acidosis with

a pH of 7.25, CO2 52, bicarbonate 22, PaO2 230, saturation 97%.

 

PAST MEDICAL HISTORY:

Significant for a history of PE and DVT, atrial fibrillation, 

hypertension, CHF, coronary artery disease, COPD.

 

PAST SURGICAL HISTORY:

Previous appendectomy, tonsillectomy and hernia repair.

 

SOCIAL HISTORY:

The patient has a 40-pack-year history of smoking and a history of 

ETOH use.  He is a nursing home resident.

 

ALLERGIES:

NO KNOWN DRUG ALLERGIES.

 

FAMILY HISTORY:

Noncontributing to present illness.

 

REPORTED MEDICATIONS:

Include:  Xarelto, metoprolol, Cardizem and Medrol Dose-Sly.

 

REVIEW OF SYSTEMS:

As per HPI.  The rest of the review of systems is unobtainable.

 

PHYSICAL EXAMINATION:

GENERAL:  This is a 67-year-old male, intubated for respiratory 

failure, status post cardiopulmonary arrest.

VITAL SIGNS:  Temperature T-max 103.2 rectally, blood pressure 104/55,

pulse 116, sats 93%, vent setting PRVC rate of 18, tidal volume 500, 

I-time 1, 5 of PEEP, FIO2 50%.

HEENT:  Atraumatic, normocephalic.  Pupils are equal, round and 

reactive to light and accommodation.  Extraocular muscles intact.  

Conjunctivae pink.  Nonicteric sclerae.  Oral mucosa within normal 

limits.

NECK:  Supple.  No JVD, adenopathy, or thyromegaly.  Trachea in the 

midline.  Orally intubated.

CARDIOVASCULAR:  Tachycardic.  Normal S1, S2.  No murmurs, rubs or 

gallops.

PULMONARY:  Bilateral air entry with coarse breath sounds.

ABDOMEN:  Soft, nontender.  No distention.  Positive bowel sounds.

EXTREMITIES:  No cyanosis, clubbing or edema.

NEUROLOGIC:  Intubated.

 

LABORATORY DATA:

Sodium 141, potassium 5.3, chloride 108, CO2 23, BUN 27, creatinine 

1.18, glucose 182.  Lactic acid 4.7, AST 60, ALT 44.

 

WBC 23.1, hemoglobin 10.8, hematocrit 35, platelet count 232.

 

Urinalysis, 15 WBC, negative for leukocyte esterase and nitrite.

 

RADIOGRAPHIC STUDIES:

Chest x-ray shows bilateral airspace disease.

 

IMPRESSION:

1.    Acute hypoxemic and hypercapnic respiratory failure requiring 

incubation.

2.    Diffuse bilateral pulmonary infiltrates.

3.    Leukocytosis with bandemia.

4.    Febrile illness.

5.    Status post cardiopulmonary arrest.

6.    Chronic obstructive pulmonary disease.

7.    Anemia.

8.    History of hypertension.

9.    History of congestive heart failure.

10.  History of pulmonary embolism and deep venous thrombosis.

11.  History of atrial fibrillation.

 

RECOMMENDATIONS:

1.    Diprivan infusion for sedation if needed.  Monitor neuro status 

closely.  The patient is for a CT scan of the brain without contrast. 

2.    Continue with vent support and maintain sats above 92%.

3.    Bronchodilators in the form of DuoNeb q. 6 hours and placed on 

Solu-Medrol 40 mg IV q. 8 hours.

4.    Increased respiratory rate to 20.  Decrease FIO2 to 40% and 

repeat ABG in 1 hour.

5.    We will proceed with CT scan of the chest without contrast for 

further evaluation of pulmonary parenchyma.

6.    Monitor heart rate and blood pressure closely and maintain MAP 

greater than 65 mmHg.  Serial lactic acid monitoring until clear.  He 

was given 3 liters of crystalloids in the ED.  We will place on 

maintenance fluids NS at 75 mL an hour.

7.    We will obtain 2D echo to evaluate LV function.  He had an 

echocardiogram on a previous admission in February which showed an EF 

of 60-65%.

8.    Monitor renal function, I's and O's and electrolyte replacement 

per protocol.  IV fluids as stated above.

9.    Keep n.p.o. for now and place on Pepcid 20 mg IV q. 12 hours for

GI prophylaxis.  Start nutrition support within 24 hours if remains 

intubated.

10.  Continued broad spectrum antibiotics.  We will place on 

vancomycin, Zosyn and azithromycin.  Monitor for signs of infection 

which include fever and WBC.  Consult ID service.  Follow up on blood 

and urine cultures.  I will obtain sputum culture with Gram stain, 

Strep pneumonia, Legionella urinary antigen and will send a nasal 

washing to rule out influenza.

11.  Placed on sliding scale insulin with Accu-Chek's for glycemic 

control.

12.  GI prophylaxis with Pepcid 20 mg q. 12 and DVT prophylaxis with 

SCDs.  We will resume Xarelto if CT scan of the brain is negative for 

acute intracranial process.

 

The patient is critically ill with respiratory failure, and pneumonia,

status post cardiopulmonary arrest.

 

Critical care time 60 minutes, excluding procedures.

 

 

 

 

__________________________________

Reji Norton MD

 

 

AI/DL

D: 03/26/2018, 12:36 PM

T: 03/26/2018, 01:33 PM

Visit #: [id number]

Job #: [id number]

## 2018-03-26 NOTE — PD.PROCEDR
Procedure Note


Procedure


Procedure: Arterial Line Placement


Right radial arterial line





Diagnosis: Status post PEA arrest





Indications: Need for beat to beat hemodynamic monitoring





Consent: Emergent





Description of the Procedure:  The right wrist was prepped and draped 

sterilely.  1% lidocaine was used for local anesthesia. The pulse was located 

and a needle was advanced into the artery.  A 20 gauge, 12 cm catheter was 

advanced into the artery using a modified Seldinger technique.  The catheter 

was sutured to the skin and a sterile dressing was applied.  The catheter was 

connected to a pressure transducer and an arterial waveform was noted.





There were no immediate complications noted.  There was minimal EBL.





I personally performed the procedure.











Earle Palacio MD Mar 26, 2018 20:44

## 2018-03-26 NOTE — PD.PROCEDR
Procedure Note


Procedure


Central Line Procedure Note





Left subclavian triple lumen catheter





Diagnosis: Status post cardiac arrest





Indications: Need for highly potent vasoactive substances





Consent: Emergent





Anesthesia: 1% lidocaine locally





Description of the Procedure: The patient was placed in the supine, mild-

Trendelenburg position.  The area was prepped and draped sterilely.  A 19g 

needle was inserted under negative pressure aspiration and dark venous blood 

was obtained.  A guidewire was inserted easily without resistance.  A small 

incision was made using a #11 blade. Using a modified Seldinger technique, the 

dilator and 7 Lithuanian, 20 cm catheter were advanced over the guidewire without 

resistance.  All ports were aspirated and flushed, and had brisk blood return.  

The line was secured at the skin using 2-0 silk interrupted sutures.  A 

Biopatch and Transparent sterile dressing were applied.  There were no 

immediate complications noted.  There was minimal EBL.  The patient tolerated 

the procedure well.





Ultrasound guidance was not used for this procedure





A Chest x-ray has been ordered.





I personally performed the procedure.











Earle Palacio MD Mar 26, 2018 20:45

## 2018-03-26 NOTE — RADRPT
EXAM DATE/TIME:  03/26/2018 13:28 

 

[facility] COMPARISON:     

CHEST SINGLE AP, March 26, 2018, 8:53.  CT BRAIN W/O CONTRAST, March 26, 2018, 13:25.  CT PULMONARY A
NGIOGRAM, February 12, 2018, 9:10.

 

 

INDICATIONS :     

Post cardiac arrest

                      

 

RADIATION DOSE:     

16.10 CTDIvol (mGy) 

 

 

MEDICAL HISTORY :     

Hypertension. Cardiovascular disease  Pulmonary embolism

 

SURGICAL HISTORY :      

Tonsillectomy.  

 

ENCOUNTER:      

Initial

 

ACUITY:      

1 day

 

PAIN SCALE:      

2/10

 

LOCATION:       

Bilateral chest 

 

TECHNIQUE:      

Volumetric scanning of the chest was performed.  Using automated exposure control and adjustment of t
he mA and/or kV according to patient size, radiation dose was kept as low as reasonably achievable to
 obtain optimal diagnostic quality images.  DICOM format image data is available electronically for r
eview and comparison.  

 

Follow-up recommendations for detected pulmonary nodules are based at a minimum on nodule size and pa
tient risk factors according to Fleischner Society Guidelines.

 

FINDINGS:     

 

LUNGS:     

Bilateral alveolar opacities are present with areas of consolidation in the perihilar regions and bot
h lower lobes. There is no focal mass. An endotracheal tube is present with the tip at the level the 
savana.

 

PLEURAE:     

There are minimal pleural effusions which are not well-visualized.

 

MEDIASTINUM:     

The heart and great vessels demonstrate no acute abnormality.  There is no mediastinal or hilar lymph
adenopathy. A nasogastric tube is coursing through the esophagus. Coronary artery calcifications are 
present.

 

AXILLAE:     

Within normal limits.  No lymphadenopathy.

 

MUSCULOSKELETAL:     

Within normal limits for patient age.

 

MISCELLANEOUS:     

The visualized upper abdominal organs demonstrate no acute abnormality.

 

CONCLUSION:     

1. Bilateral airspace disease with areas of consolidation and air bronchograms most characteristic of
 pulmonary edema.

2. Minimal pleural effusions.

3. The patient is intubated with the endotracheal tube tip at the level of the savana.

 

 

 

 

 Rob Marshall MD on March 26, 2018 at 13:34           

Board Certified Radiologist.

 This report was verified electronically.

## 2018-03-26 NOTE — HHI.PR
Subjective


Remarks


Patient was transferred to [place] ICU for ongoing management.  I evaluated 

the patient in the [place] ICU separate time from Dr. Phillips's 

evaluation.  The patient is persistently encephalopathic, GCS 3, pupils fixed 

and dilated.  The patient is labored breathing and an ABG resulted in severe 

metabolic acidosis with a pH of 7 and a base deficit of -9.  I emergently gave 

some sodium bicarbonate to help bring the pH into a more normal range.  I 

placed arterial line and central venous catheter to assist in ongoing 

management and evaluation of cardiac filling, see separate procedure note for 

details.  Patient remains in acute hypoxic hypercarbic respiratory failure as 

well as septic shock with an elevated lactate.





Objective





Vital Signs








  Date Time  Temp Pulse Resp B/P (MAP) Pulse Ox O2 Delivery O2 Flow Rate FiO2


 


3/26/18 19:05  110 40 121/78 (92) 90   


 


3/26/18 19:03  104 34 41/26 (31) 90   


 


3/26/18 18:59  110 33 44/12 (23) 90   


 


3/26/18 18:45  106      


 


3/26/18 18:45  104 36 106/81 (89) 91   


 


3/26/18 18:30  106 43 114/75 (88) 94   


 


3/26/18 17:56     95   100


 


3/26/18 17:45  106      


 


3/26/18 17:45        100


 


3/26/18 17:45 99.2 104 38 208/92 (130) 91   


 


3/26/18 16:32 99.7 122 24 96/69 (78) 98 Ventilator  


 


3/26/18 16:13 99.5 117 22 119/53 (75) 96 Ventilator  


 


3/26/18 15:37 99.1 114 18 90/92 (91) 95 Ventilator  100


 


3/26/18 15:15     94   100


 


3/26/18 14:44 98.6 115 18 131/62 (85) 91 Ventilator  


 


3/26/18 14:21  110 16 106/59 (75) 93 Ventilator  


 


3/26/18 13:50 98.2 101  109/59 (76) 94 Ventilator  


 


3/26/18 13:40     99   100


 


3/26/18 13:00 98.2 103 20 134/68 (90) 100 Ventilator  50


 


3/26/18 12:00 99.1 108 18 123/67 (85) 100 Ventilator  50


 


3/26/18 11:31 101.1 101 18 101/59 (73) 100 Ventilator  50


 


3/26/18 11:25 101.6 98 18 99/71 (80) 98 Ventilator  50


 


3/26/18 11:19 101.8 106 20 101/68 (79) 100 Ventilator  50


 


3/26/18 11:09 102.0 100 20 142/67 (92) 100 Ventilator  50


 


3/26/18 10:30 102.6 102 20 139/83 (101) 100 Ventilator  50


 


3/26/18 10:23 102.3 115 22 110/68 (82) 100 Ventilator  50


 


3/26/18 10:08 102.2 114 24 159/68 (98) 98 Ventilator  50


 


3/26/18 09:58 102.1    (97) 100 Ventilator  50


 


3/26/18 09:37  129  59/68    


 


3/26/18 09:35        50


 


3/26/18 09:34 101.3 133 28 148/72 (97) 100 Ventilator  100


 


3/26/18 09:17  155  150/88    


 


3/26/18 09:05  162  148/89    


 


3/26/18 08:55  156 38 148/89 (108) 100 Ventilator  


 


3/26/18 08:49     100   100


 


3/26/18 08:40     100 Ventilator  100


 


3/26/18 08:40        100


 


3/26/18 08:40   38  100 Ventilator  100


 


3/26/18 08:35  171 38  100 Ventilator  100


 


3/26/18 08:35 103.1 168 38 199/110 (139)    














I/O      


 


 3/25/18 3/25/18 3/25/18 3/26/18 3/26/18 3/26/18





 07:00 15:00 23:00 07:00 15:00 23:00


 


Intake Total     3550 ml 


 


Balance     3550 ml 


 


      


 


Intake IV Total     3550 ml 








Result Diagram:  


3/26/18 1248                                                                   

             3/26/18 1605





Objective Remarks


Patient is obtunded,, comatose, GCS 3.  Nonresponsive.  Pupils 5 mm, 

nonreactive.





Labored respirations, tachycardic and tachypneic





Extremities are cold and poorly perfused.





Assessment and Plan


Assessment and Plan


Assessment: 67-year-old male status post out of hospital cardiac arrest with 

severe leukocytosis, fever, evidence of septic shock, likely from pulmonary 

origin.  Continue antibiotics.  Serial lactates.  Trend labs.  Frequent urine 

output monitoring.  Given poor neurologic exam, unlikely to have a good 

prognosis.  Palliative care involved.  Remains very critically ill.





Ongoing active problems:


Severe metabolic acidosis


Septic shock


Status post cardiac arrest


Hypoxic ischemic encephalopathy





Plan:


Placed arterial and central venous catheters


Sodium bicarb normalized pH


Serial labs


Close unit for monitoring


Frequent neurochecks


No weaning of mechanical ventilation until neurologic improvement


Appreciate palliative care.





This patient remains critically ill with one or more organ systems which are or 

may become a threat to life.  This addendum represents an additional 25 minutes 

in excess of any time previously documented in the care and management of this 

patient.  This time is discontinuous, exclusive of procedures, and includes, 

but is not limited to, evaluation of the patient, review of the medical record, 

discussions with family, consultants, nursing staff, or respiratory therapy, 

and documentation in the medical record.











Earle Palacio MD Mar 26, 2018 20:48

## 2018-03-26 NOTE — HHI.HCPN
Attempted to contact family to notify them re: patient's critical condition; to 

verify appropriate legal decision maker; and get guidance regarding goals of 

medical treatment.





Calls included :  


* mAerica Guerrero (daughter)  [facility] --  [phone]    -- voicemail 

was full; unable to leave message


* Katelynn Womack  (sister)  [phone]   -- No answer.





We will need to continue with full code status and all aggressive care until we 

can identify legal health care decision maker and get guidance regarding goals.











Deonte Carrizales MD Mar 26, 2018 16:31

## 2018-03-26 NOTE — RADRPT
EXAM DATE/TIME:  03/26/2018 13:25 

 

[facility] COMPARISON:     

CT BRAIN W/O CONTRAST, April 08, 2017, 7:51.

 

 

INDICATIONS :     

Altered mental status

                      

 

RADIATION DOSE:     

60.34 CTDIvol (mGy) 

 

 

 

MEDICAL HISTORY :     

Hypertension. Cardiovascular disease Pulmonary embolism

 

SURGICAL HISTORY :      

Tonsillectomy. 

 

ENCOUNTER:      

Initial

 

ACUITY:      

1 day

 

PAIN SCALE:      

0/10

 

LOCATION:        

cranial 

 

TECHNIQUE:     

Multiple contiguous axial images were obtained of the head.  Using automated exposure control and adj
ustment of the mA and/or kV according to patient size, radiation dose was kept as low as reasonably a
chievable to obtain optimal diagnostic quality images.   DICOM format image data is available electro
nically for review and comparison.  

 

FINDINGS:     

 

CEREBRUM:     

The ventricles are normal for age.  No evidence of midline shift, mass lesion, hemorrhage or acute in
farction.  No extra-axial fluid collections are seen.

 

POSTERIOR FOSSA:     

The cerebellum and brainstem are intact.  The 4th ventricle is midline.  The cerebellopontine angle i
s unremarkable.

 

EXTRACRANIAL:     

The visualized portion of the orbits is intact.

 

SKULL:     

The calvaria is intact.  No evidence of skull fracture.

 

CONCLUSION:     Negative noncontrast CT. 

 

 

 Rob Marshall MD on March 26, 2018 at 13:30           

Board Certified Radiologist.

 This report was verified electronically.

## 2018-03-26 NOTE — HHI.HCSW
Visit


Advance Directive


Spoke with social service department at [facility]. No advanced 

directives on file, states Mr. Guerrero was his own person. They do not know of 

any other family than his daughter and sister (both listed in EMR). 


.


Follow Up Visit


Palliative care consulted to assist with goals of care. Goals to be determined 

pending family discussion with daughter to determine health care proxy and 

patient/family wishes.











Denisse Trotter, KAYLAW Mar 26, 2018 15:47

## 2018-03-26 NOTE — RADRPT
EXAM DATE/TIME:  03/26/2018 20:45 

 

[facility] COMPARISON:     

CHEST SINGLE AP, March 26, 2018, 15:10.

 

                     

INDICATIONS :     

Central line placement.

                     

 

MEDICAL HISTORY :     

Hypertension.  Cardiovascular disease.     Pulmonary embolism.   

 

SURGICAL HISTORY :     

None.   

 

ENCOUNTER:     

Subsequent                                        

 

ACUITY:     

2 days      

 

PAIN SCORE:     

Non-responsive.

 

LOCATION:     

Bilateral chest 

 

FINDINGS:     

Endotracheal tube in good position. NG enters stomach. Left central line in superior vena cava. Bilat
eral patchy airspace disease and pleural effusions similar to exam from earlier today.

 

CONCLUSION:     

1. Placement of left central line with tip in superior vena cava. No pneumothorax.

 

 

 

 Reymundo Flynn MD on March 26, 2018 at 21:47           

Board Certified Radiologist.

 This report was verified electronically.

## 2018-03-27 VITALS — SYSTOLIC BLOOD PRESSURE: 44 MMHG | HEART RATE: 136 BPM | OXYGEN SATURATION: 94 % | DIASTOLIC BLOOD PRESSURE: 30 MMHG

## 2018-03-27 VITALS
HEART RATE: 116 BPM | DIASTOLIC BLOOD PRESSURE: 58 MMHG | SYSTOLIC BLOOD PRESSURE: 110 MMHG | RESPIRATION RATE: 22 BRPM | OXYGEN SATURATION: 97 %

## 2018-03-27 VITALS
DIASTOLIC BLOOD PRESSURE: 60 MMHG | SYSTOLIC BLOOD PRESSURE: 124 MMHG | OXYGEN SATURATION: 96 % | RESPIRATION RATE: 19 BRPM | HEART RATE: 108 BPM

## 2018-03-27 VITALS
DIASTOLIC BLOOD PRESSURE: 66 MMHG | OXYGEN SATURATION: 98 % | SYSTOLIC BLOOD PRESSURE: 136 MMHG | HEART RATE: 106 BPM | RESPIRATION RATE: 19 BRPM

## 2018-03-27 VITALS
DIASTOLIC BLOOD PRESSURE: 54 MMHG | OXYGEN SATURATION: 97 % | RESPIRATION RATE: 20 BRPM | SYSTOLIC BLOOD PRESSURE: 71 MMHG | HEART RATE: 118 BPM

## 2018-03-27 VITALS
RESPIRATION RATE: 26 BRPM | HEART RATE: 114 BPM | OXYGEN SATURATION: 93 % | DIASTOLIC BLOOD PRESSURE: 62 MMHG | SYSTOLIC BLOOD PRESSURE: 118 MMHG

## 2018-03-27 VITALS
OXYGEN SATURATION: 97 % | SYSTOLIC BLOOD PRESSURE: 80 MMHG | RESPIRATION RATE: 20 BRPM | DIASTOLIC BLOOD PRESSURE: 58 MMHG | HEART RATE: 110 BPM

## 2018-03-27 VITALS
OXYGEN SATURATION: 97 % | SYSTOLIC BLOOD PRESSURE: 100 MMHG | HEART RATE: 116 BPM | RESPIRATION RATE: 20 BRPM | DIASTOLIC BLOOD PRESSURE: 56 MMHG

## 2018-03-27 VITALS
RESPIRATION RATE: 48 BRPM | OXYGEN SATURATION: 99 % | SYSTOLIC BLOOD PRESSURE: 138 MMHG | DIASTOLIC BLOOD PRESSURE: 68 MMHG | HEART RATE: 136 BPM

## 2018-03-27 VITALS
OXYGEN SATURATION: 99 % | DIASTOLIC BLOOD PRESSURE: 36 MMHG | SYSTOLIC BLOOD PRESSURE: 64 MMHG | RESPIRATION RATE: 11 BRPM | HEART RATE: 108 BPM

## 2018-03-27 VITALS
RESPIRATION RATE: 23 BRPM | SYSTOLIC BLOOD PRESSURE: 85 MMHG | DIASTOLIC BLOOD PRESSURE: 50 MMHG | OXYGEN SATURATION: 99 % | HEART RATE: 126 BPM

## 2018-03-27 VITALS
RESPIRATION RATE: 20 BRPM | HEART RATE: 102 BPM | DIASTOLIC BLOOD PRESSURE: 62 MMHG | SYSTOLIC BLOOD PRESSURE: 120 MMHG | OXYGEN SATURATION: 97 %

## 2018-03-27 VITALS
DIASTOLIC BLOOD PRESSURE: 64 MMHG | HEART RATE: 102 BPM | RESPIRATION RATE: 19 BRPM | SYSTOLIC BLOOD PRESSURE: 134 MMHG | OXYGEN SATURATION: 97 %

## 2018-03-27 VITALS
DIASTOLIC BLOOD PRESSURE: 56 MMHG | RESPIRATION RATE: 20 BRPM | OXYGEN SATURATION: 96 % | HEART RATE: 118 BPM | SYSTOLIC BLOOD PRESSURE: 114 MMHG

## 2018-03-27 VITALS
DIASTOLIC BLOOD PRESSURE: 74 MMHG | RESPIRATION RATE: 20 BRPM | HEART RATE: 92 BPM | OXYGEN SATURATION: 99 % | SYSTOLIC BLOOD PRESSURE: 142 MMHG

## 2018-03-27 VITALS
HEART RATE: 102 BPM | SYSTOLIC BLOOD PRESSURE: 86 MMHG | RESPIRATION RATE: 20 BRPM | DIASTOLIC BLOOD PRESSURE: 54 MMHG | OXYGEN SATURATION: 99 %

## 2018-03-27 VITALS
SYSTOLIC BLOOD PRESSURE: 82 MMHG | RESPIRATION RATE: 20 BRPM | DIASTOLIC BLOOD PRESSURE: 48 MMHG | HEART RATE: 110 BPM | OXYGEN SATURATION: 97 %

## 2018-03-27 VITALS
DIASTOLIC BLOOD PRESSURE: 54 MMHG | HEART RATE: 112 BPM | OXYGEN SATURATION: 95 % | RESPIRATION RATE: 20 BRPM | SYSTOLIC BLOOD PRESSURE: 104 MMHG

## 2018-03-27 VITALS
HEART RATE: 104 BPM | RESPIRATION RATE: 18 BRPM | SYSTOLIC BLOOD PRESSURE: 124 MMHG | DIASTOLIC BLOOD PRESSURE: 62 MMHG | OXYGEN SATURATION: 99 %

## 2018-03-27 VITALS
HEART RATE: 94 BPM | SYSTOLIC BLOOD PRESSURE: 152 MMHG | OXYGEN SATURATION: 99 % | RESPIRATION RATE: 20 BRPM | DIASTOLIC BLOOD PRESSURE: 74 MMHG

## 2018-03-27 VITALS
OXYGEN SATURATION: 97 % | RESPIRATION RATE: 19 BRPM | DIASTOLIC BLOOD PRESSURE: 66 MMHG | HEART RATE: 102 BPM | SYSTOLIC BLOOD PRESSURE: 134 MMHG

## 2018-03-27 VITALS
DIASTOLIC BLOOD PRESSURE: 50 MMHG | OXYGEN SATURATION: 99 % | RESPIRATION RATE: 31 BRPM | HEART RATE: 122 BPM | SYSTOLIC BLOOD PRESSURE: 96 MMHG

## 2018-03-27 VITALS
OXYGEN SATURATION: 97 % | DIASTOLIC BLOOD PRESSURE: 64 MMHG | RESPIRATION RATE: 20 BRPM | HEART RATE: 110 BPM | SYSTOLIC BLOOD PRESSURE: 130 MMHG

## 2018-03-27 VITALS
RESPIRATION RATE: 19 BRPM | HEART RATE: 120 BPM | OXYGEN SATURATION: 99 % | SYSTOLIC BLOOD PRESSURE: 94 MMHG | DIASTOLIC BLOOD PRESSURE: 52 MMHG

## 2018-03-27 VITALS
HEART RATE: 106 BPM | OXYGEN SATURATION: 98 % | DIASTOLIC BLOOD PRESSURE: 56 MMHG | SYSTOLIC BLOOD PRESSURE: 100 MMHG | RESPIRATION RATE: 19 BRPM

## 2018-03-27 VITALS
SYSTOLIC BLOOD PRESSURE: 76 MMHG | TEMPERATURE: 99.1 F | DIASTOLIC BLOOD PRESSURE: 44 MMHG | OXYGEN SATURATION: 97 % | RESPIRATION RATE: 20 BRPM | HEART RATE: 108 BPM

## 2018-03-27 VITALS
HEART RATE: 104 BPM | SYSTOLIC BLOOD PRESSURE: 130 MMHG | DIASTOLIC BLOOD PRESSURE: 64 MMHG | OXYGEN SATURATION: 98 % | RESPIRATION RATE: 20 BRPM

## 2018-03-27 VITALS
OXYGEN SATURATION: 95 % | DIASTOLIC BLOOD PRESSURE: 64 MMHG | RESPIRATION RATE: 20 BRPM | HEART RATE: 106 BPM | SYSTOLIC BLOOD PRESSURE: 132 MMHG

## 2018-03-27 VITALS
HEART RATE: 120 BPM | SYSTOLIC BLOOD PRESSURE: 92 MMHG | DIASTOLIC BLOOD PRESSURE: 48 MMHG | RESPIRATION RATE: 32 BRPM | OXYGEN SATURATION: 99 %

## 2018-03-27 VITALS
RESPIRATION RATE: 20 BRPM | DIASTOLIC BLOOD PRESSURE: 58 MMHG | OXYGEN SATURATION: 96 % | SYSTOLIC BLOOD PRESSURE: 122 MMHG | HEART RATE: 106 BPM

## 2018-03-27 VITALS
DIASTOLIC BLOOD PRESSURE: 72 MMHG | HEART RATE: 96 BPM | SYSTOLIC BLOOD PRESSURE: 140 MMHG | RESPIRATION RATE: 20 BRPM | OXYGEN SATURATION: 99 %

## 2018-03-27 VITALS — HEART RATE: 101 BPM

## 2018-03-27 VITALS
HEART RATE: 142 BPM | DIASTOLIC BLOOD PRESSURE: 82 MMHG | RESPIRATION RATE: 39 BRPM | SYSTOLIC BLOOD PRESSURE: 154 MMHG | OXYGEN SATURATION: 98 %

## 2018-03-27 VITALS
HEART RATE: 96 BPM | SYSTOLIC BLOOD PRESSURE: 110 MMHG | RESPIRATION RATE: 20 BRPM | DIASTOLIC BLOOD PRESSURE: 56 MMHG | OXYGEN SATURATION: 97 %

## 2018-03-27 VITALS
RESPIRATION RATE: 20 BRPM | OXYGEN SATURATION: 97 % | SYSTOLIC BLOOD PRESSURE: 94 MMHG | HEART RATE: 108 BPM | DIASTOLIC BLOOD PRESSURE: 52 MMHG

## 2018-03-27 VITALS
OXYGEN SATURATION: 99 % | DIASTOLIC BLOOD PRESSURE: 74 MMHG | RESPIRATION RATE: 19 BRPM | SYSTOLIC BLOOD PRESSURE: 146 MMHG | HEART RATE: 94 BPM

## 2018-03-27 VITALS
HEART RATE: 110 BPM | RESPIRATION RATE: 20 BRPM | SYSTOLIC BLOOD PRESSURE: 82 MMHG | OXYGEN SATURATION: 99 % | DIASTOLIC BLOOD PRESSURE: 46 MMHG

## 2018-03-27 VITALS
RESPIRATION RATE: 20 BRPM | HEART RATE: 112 BPM | OXYGEN SATURATION: 98 % | SYSTOLIC BLOOD PRESSURE: 100 MMHG | DIASTOLIC BLOOD PRESSURE: 54 MMHG

## 2018-03-27 VITALS
DIASTOLIC BLOOD PRESSURE: 72 MMHG | HEART RATE: 128 BPM | OXYGEN SATURATION: 99 % | RESPIRATION RATE: 43 BRPM | SYSTOLIC BLOOD PRESSURE: 146 MMHG

## 2018-03-27 VITALS
DIASTOLIC BLOOD PRESSURE: 53 MMHG | TEMPERATURE: 98.2 F | OXYGEN SATURATION: 97 % | SYSTOLIC BLOOD PRESSURE: 80 MMHG | HEART RATE: 114 BPM | RESPIRATION RATE: 15 BRPM

## 2018-03-27 VITALS
RESPIRATION RATE: 31 BRPM | HEART RATE: 122 BPM | DIASTOLIC BLOOD PRESSURE: 56 MMHG | OXYGEN SATURATION: 99 % | SYSTOLIC BLOOD PRESSURE: 96 MMHG

## 2018-03-27 VITALS
DIASTOLIC BLOOD PRESSURE: 69 MMHG | SYSTOLIC BLOOD PRESSURE: 132 MMHG | RESPIRATION RATE: 34 BRPM | OXYGEN SATURATION: 98 % | HEART RATE: 128 BPM

## 2018-03-27 VITALS
OXYGEN SATURATION: 99 % | SYSTOLIC BLOOD PRESSURE: 100 MMHG | HEART RATE: 118 BPM | DIASTOLIC BLOOD PRESSURE: 52 MMHG | RESPIRATION RATE: 28 BRPM

## 2018-03-27 VITALS
DIASTOLIC BLOOD PRESSURE: 62 MMHG | SYSTOLIC BLOOD PRESSURE: 126 MMHG | OXYGEN SATURATION: 97 % | HEART RATE: 110 BPM | RESPIRATION RATE: 20 BRPM

## 2018-03-27 VITALS
HEART RATE: 130 BPM | RESPIRATION RATE: 22 BRPM | SYSTOLIC BLOOD PRESSURE: 77 MMHG | TEMPERATURE: 100.1 F | DIASTOLIC BLOOD PRESSURE: 56 MMHG | OXYGEN SATURATION: 98 %

## 2018-03-27 VITALS
RESPIRATION RATE: 19 BRPM | SYSTOLIC BLOOD PRESSURE: 104 MMHG | OXYGEN SATURATION: 98 % | DIASTOLIC BLOOD PRESSURE: 56 MMHG | HEART RATE: 94 BPM

## 2018-03-27 VITALS
OXYGEN SATURATION: 99 % | RESPIRATION RATE: 20 BRPM | SYSTOLIC BLOOD PRESSURE: 86 MMHG | DIASTOLIC BLOOD PRESSURE: 52 MMHG | HEART RATE: 108 BPM

## 2018-03-27 VITALS
HEART RATE: 124 BPM | SYSTOLIC BLOOD PRESSURE: 98 MMHG | OXYGEN SATURATION: 99 % | DIASTOLIC BLOOD PRESSURE: 56 MMHG | RESPIRATION RATE: 33 BRPM

## 2018-03-27 VITALS
HEART RATE: 116 BPM | DIASTOLIC BLOOD PRESSURE: 50 MMHG | RESPIRATION RATE: 20 BRPM | OXYGEN SATURATION: 99 % | SYSTOLIC BLOOD PRESSURE: 88 MMHG

## 2018-03-27 VITALS
OXYGEN SATURATION: 97 % | RESPIRATION RATE: 20 BRPM | SYSTOLIC BLOOD PRESSURE: 84 MMHG | HEART RATE: 108 BPM | DIASTOLIC BLOOD PRESSURE: 44 MMHG

## 2018-03-27 VITALS
SYSTOLIC BLOOD PRESSURE: 87 MMHG | DIASTOLIC BLOOD PRESSURE: 56 MMHG | HEART RATE: 116 BPM | RESPIRATION RATE: 22 BRPM | OXYGEN SATURATION: 100 %

## 2018-03-27 VITALS
RESPIRATION RATE: 20 BRPM | HEART RATE: 114 BPM | DIASTOLIC BLOOD PRESSURE: 54 MMHG | OXYGEN SATURATION: 98 % | SYSTOLIC BLOOD PRESSURE: 94 MMHG

## 2018-03-27 VITALS
SYSTOLIC BLOOD PRESSURE: 98 MMHG | HEART RATE: 116 BPM | RESPIRATION RATE: 20 BRPM | DIASTOLIC BLOOD PRESSURE: 56 MMHG | OXYGEN SATURATION: 97 %

## 2018-03-27 VITALS
OXYGEN SATURATION: 96 % | RESPIRATION RATE: 20 BRPM | SYSTOLIC BLOOD PRESSURE: 114 MMHG | DIASTOLIC BLOOD PRESSURE: 56 MMHG | HEART RATE: 112 BPM

## 2018-03-27 VITALS
DIASTOLIC BLOOD PRESSURE: 54 MMHG | SYSTOLIC BLOOD PRESSURE: 78 MMHG | RESPIRATION RATE: 19 BRPM | HEART RATE: 116 BPM | OXYGEN SATURATION: 99 %

## 2018-03-27 VITALS
SYSTOLIC BLOOD PRESSURE: 74 MMHG | HEART RATE: 110 BPM | DIASTOLIC BLOOD PRESSURE: 40 MMHG | RESPIRATION RATE: 20 BRPM | OXYGEN SATURATION: 98 %

## 2018-03-27 VITALS
OXYGEN SATURATION: 94 % | DIASTOLIC BLOOD PRESSURE: 48 MMHG | RESPIRATION RATE: 27 BRPM | HEART RATE: 104 BPM | SYSTOLIC BLOOD PRESSURE: 92 MMHG

## 2018-03-27 VITALS
OXYGEN SATURATION: 98 % | HEART RATE: 106 BPM | SYSTOLIC BLOOD PRESSURE: 78 MMHG | RESPIRATION RATE: 20 BRPM | DIASTOLIC BLOOD PRESSURE: 46 MMHG

## 2018-03-27 VITALS
DIASTOLIC BLOOD PRESSURE: 64 MMHG | HEART RATE: 114 BPM | RESPIRATION RATE: 15 BRPM | TEMPERATURE: 99.3 F | SYSTOLIC BLOOD PRESSURE: 128 MMHG | OXYGEN SATURATION: 98 %

## 2018-03-27 VITALS
DIASTOLIC BLOOD PRESSURE: 58 MMHG | SYSTOLIC BLOOD PRESSURE: 108 MMHG | HEART RATE: 116 BPM | OXYGEN SATURATION: 96 % | RESPIRATION RATE: 19 BRPM

## 2018-03-27 VITALS
RESPIRATION RATE: 20 BRPM | HEART RATE: 116 BPM | SYSTOLIC BLOOD PRESSURE: 118 MMHG | OXYGEN SATURATION: 97 % | DIASTOLIC BLOOD PRESSURE: 64 MMHG

## 2018-03-27 VITALS
HEART RATE: 110 BPM | OXYGEN SATURATION: 97 % | RESPIRATION RATE: 20 BRPM | SYSTOLIC BLOOD PRESSURE: 76 MMHG | DIASTOLIC BLOOD PRESSURE: 44 MMHG

## 2018-03-27 VITALS
SYSTOLIC BLOOD PRESSURE: 76 MMHG | RESPIRATION RATE: 20 BRPM | HEART RATE: 108 BPM | DIASTOLIC BLOOD PRESSURE: 46 MMHG | OXYGEN SATURATION: 97 %

## 2018-03-27 VITALS — HEART RATE: 120 BPM

## 2018-03-27 VITALS
RESPIRATION RATE: 29 BRPM | SYSTOLIC BLOOD PRESSURE: 76 MMHG | HEART RATE: 126 BPM | DIASTOLIC BLOOD PRESSURE: 42 MMHG | OXYGEN SATURATION: 100 %

## 2018-03-27 VITALS
OXYGEN SATURATION: 99 % | RESPIRATION RATE: 20 BRPM | HEART RATE: 118 BPM | DIASTOLIC BLOOD PRESSURE: 52 MMHG | SYSTOLIC BLOOD PRESSURE: 92 MMHG

## 2018-03-27 VITALS
DIASTOLIC BLOOD PRESSURE: 72 MMHG | SYSTOLIC BLOOD PRESSURE: 142 MMHG | OXYGEN SATURATION: 98 % | RESPIRATION RATE: 19 BRPM | HEART RATE: 92 BPM

## 2018-03-27 VITALS
SYSTOLIC BLOOD PRESSURE: 114 MMHG | HEART RATE: 126 BPM | OXYGEN SATURATION: 99 % | DIASTOLIC BLOOD PRESSURE: 58 MMHG | RESPIRATION RATE: 32 BRPM | TEMPERATURE: 99.1 F

## 2018-03-27 VITALS
SYSTOLIC BLOOD PRESSURE: 128 MMHG | OXYGEN SATURATION: 97 % | HEART RATE: 112 BPM | DIASTOLIC BLOOD PRESSURE: 64 MMHG | RESPIRATION RATE: 20 BRPM

## 2018-03-27 VITALS — OXYGEN SATURATION: 98 %

## 2018-03-27 VITALS
OXYGEN SATURATION: 99 % | DIASTOLIC BLOOD PRESSURE: 50 MMHG | SYSTOLIC BLOOD PRESSURE: 92 MMHG | HEART RATE: 114 BPM | RESPIRATION RATE: 20 BRPM

## 2018-03-27 VITALS — OXYGEN SATURATION: 99 %

## 2018-03-27 VITALS
DIASTOLIC BLOOD PRESSURE: 70 MMHG | SYSTOLIC BLOOD PRESSURE: 138 MMHG | HEART RATE: 94 BPM | OXYGEN SATURATION: 97 % | RESPIRATION RATE: 19 BRPM

## 2018-03-27 VITALS
RESPIRATION RATE: 19 BRPM | OXYGEN SATURATION: 96 % | SYSTOLIC BLOOD PRESSURE: 124 MMHG | HEART RATE: 108 BPM | DIASTOLIC BLOOD PRESSURE: 64 MMHG

## 2018-03-27 VITALS
HEART RATE: 124 BPM | RESPIRATION RATE: 32 BRPM | OXYGEN SATURATION: 99 % | SYSTOLIC BLOOD PRESSURE: 96 MMHG | DIASTOLIC BLOOD PRESSURE: 50 MMHG

## 2018-03-27 VITALS
DIASTOLIC BLOOD PRESSURE: 54 MMHG | HEART RATE: 108 BPM | RESPIRATION RATE: 19 BRPM | SYSTOLIC BLOOD PRESSURE: 92 MMHG | OXYGEN SATURATION: 97 %

## 2018-03-27 VITALS
RESPIRATION RATE: 23 BRPM | SYSTOLIC BLOOD PRESSURE: 110 MMHG | DIASTOLIC BLOOD PRESSURE: 60 MMHG | HEART RATE: 112 BPM | OXYGEN SATURATION: 95 %

## 2018-03-27 VITALS
HEART RATE: 108 BPM | RESPIRATION RATE: 19 BRPM | SYSTOLIC BLOOD PRESSURE: 118 MMHG | OXYGEN SATURATION: 96 % | DIASTOLIC BLOOD PRESSURE: 58 MMHG

## 2018-03-27 VITALS
HEART RATE: 110 BPM | SYSTOLIC BLOOD PRESSURE: 74 MMHG | OXYGEN SATURATION: 98 % | RESPIRATION RATE: 20 BRPM | DIASTOLIC BLOOD PRESSURE: 42 MMHG

## 2018-03-27 VITALS
HEART RATE: 106 BPM | DIASTOLIC BLOOD PRESSURE: 44 MMHG | SYSTOLIC BLOOD PRESSURE: 80 MMHG | OXYGEN SATURATION: 98 % | RESPIRATION RATE: 20 BRPM

## 2018-03-27 VITALS
RESPIRATION RATE: 42 BRPM | SYSTOLIC BLOOD PRESSURE: 156 MMHG | OXYGEN SATURATION: 96 % | DIASTOLIC BLOOD PRESSURE: 80 MMHG | HEART RATE: 134 BPM

## 2018-03-27 VITALS
DIASTOLIC BLOOD PRESSURE: 58 MMHG | RESPIRATION RATE: 32 BRPM | HEART RATE: 124 BPM | SYSTOLIC BLOOD PRESSURE: 118 MMHG | OXYGEN SATURATION: 100 %

## 2018-03-27 VITALS
OXYGEN SATURATION: 99 % | DIASTOLIC BLOOD PRESSURE: 52 MMHG | SYSTOLIC BLOOD PRESSURE: 102 MMHG | RESPIRATION RATE: 50 BRPM | HEART RATE: 122 BPM

## 2018-03-27 VITALS — OXYGEN SATURATION: 96 %

## 2018-03-27 LAB
ALBUMIN SERPL-MCNC: 2.1 GM/DL (ref 3.4–5)
ALP SERPL-CCNC: 92 U/L (ref 45–117)
ALT SERPL-CCNC: 2117 U/L (ref 12–78)
AST SERPL-CCNC: 1496 U/L (ref 15–37)
BASOPHILS # BLD AUTO: 0 TH/MM3 (ref 0–0.2)
BASOPHILS NFR BLD: 0.1 % (ref 0–2)
BILIRUB SERPL-MCNC: 1.1 MG/DL (ref 0.2–1)
BUN SERPL-MCNC: 34 MG/DL (ref 7–18)
BUN SERPL-MCNC: 37 MG/DL (ref 7–18)
CALCIUM SERPL-MCNC: 7.1 MG/DL (ref 8.5–10.1)
CALCIUM SERPL-MCNC: 7.1 MG/DL (ref 8.5–10.1)
CALCIUM TP COR SERPL-MCNC: 8.4 MG/DL (ref 8.5–10.1)
CALCIUM TP COR SERPL-MCNC: 8.5 MG/DL (ref 8.5–10.1)
CHLORIDE SERPL-SCNC: 115 MEQ/L (ref 98–107)
CREAT SERPL-MCNC: 1.8 MG/DL (ref 0.6–1.3)
CREAT SERPL-MCNC: 2.3 MG/DL (ref 0.6–1.3)
EOSINOPHIL # BLD: 0 TH/MM3 (ref 0–0.4)
EOSINOPHIL NFR BLD: 0.1 % (ref 0–4)
ERYTHROCYTE [DISTWIDTH] IN BLOOD BY AUTOMATED COUNT: 22.9 % (ref 11.6–17.2)
GFR SERPLBLD BASED ON 1.73 SQ M-ARVRAT: 28 ML/MIN (ref 89–?)
GFR SERPLBLD BASED ON 1.73 SQ M-ARVRAT: 38 ML/MIN (ref 89–?)
GLUCOSE SERPL-MCNC: 111 MG/DL (ref 74–106)
GLUCOSE SERPL-MCNC: 116 MG/DL (ref 74–106)
HCO3 BLD-SCNC: 21.5 MEQ/L (ref 21–32)
HCO3 BLD-SCNC: 21.8 MEQ/L (ref 21–32)
HCT VFR BLD CALC: 34.4 % (ref 39–51)
HGB BLD-MCNC: 10.4 GM/DL (ref 13–17)
INR PPP: 1.4 RATIO
LYMPHOCYTES # BLD AUTO: 0.8 TH/MM3 (ref 1–4.8)
LYMPHOCYTES NFR BLD AUTO: 3.7 % (ref 9–44)
LYMPHOCYTES: 9 % (ref 9–44)
MCH RBC QN AUTO: 23.2 PG (ref 27–34)
MCHC RBC AUTO-ENTMCNC: 30.1 % (ref 32–36)
MCV RBC AUTO: 77.2 FL (ref 80–100)
METAMYELOCYTES NFR BLD: 4 % (ref 0–1)
MONOCYTE #: 0.3 TH/MM3 (ref 0–0.9)
MONOCYTES NFR BLD: 1.3 % (ref 0–8)
MONOCYTES: 2 % (ref 0–8)
NEUTROPHILS # BLD AUTO: 19.2 TH/MM3 (ref 1.8–7.7)
NEUTROPHILS NFR BLD AUTO: 94.8 % (ref 16–70)
NEUTS BAND # BLD MANUAL: 18.1 TH/MM3 (ref 1.8–7.7)
NEUTS BAND NFR BLD: 30 % (ref 0–6)
NEUTS SEG NFR BLD MANUAL: 55 % (ref 16–70)
NUCLEATED RED BLOOD CELL: 2 (ref 0–0)
OVALOCYTES BLD QL SMEAR: (no result)
PLATELET # BLD: 160 TH/MM3 (ref 150–450)
PMV BLD AUTO: 9.9 FL (ref 7–11)
PROT SERPL-MCNC: 4.5 GM/DL (ref 6.4–8.2)
PROT SERPL-MCNC: 4.8 GM/DL (ref 6.4–8.2)
PROTHROMBIN TIME: 14.4 SEC (ref 9.8–11.6)
RBC # BLD AUTO: 4.46 MIL/MM3 (ref 4.5–5.9)
SODIUM SERPL-SCNC: 144 MEQ/L (ref 136–145)
WBC # BLD AUTO: 20.3 TH/MM3 (ref 4–11)
WBC NRBC COR # BLD: 2 /100 WBC (ref 0–0)

## 2018-03-27 RX ADMIN — HUMAN INSULIN SCH: 100 INJECTION, SOLUTION SUBCUTANEOUS at 09:24

## 2018-03-27 RX ADMIN — Medication PRN MLS/HR: at 01:51

## 2018-03-27 RX ADMIN — AZITHROMYCIN SCH MLS/HR: 500 INJECTION, POWDER, LYOPHILIZED, FOR SOLUTION INTRAVENOUS at 11:00

## 2018-03-27 RX ADMIN — METHYLPREDNISOLONE SODIUM SUCCINATE SCH MG: 40 INJECTION, POWDER, FOR SOLUTION INTRAMUSCULAR; INTRAVENOUS at 15:11

## 2018-03-27 RX ADMIN — HEPARIN SODIUM SCH UNITS: 10000 INJECTION, SOLUTION INTRAVENOUS; SUBCUTANEOUS at 09:23

## 2018-03-27 RX ADMIN — HUMAN INSULIN SCH: 100 INJECTION, SOLUTION SUBCUTANEOUS at 00:15

## 2018-03-27 RX ADMIN — HEPARIN SODIUM SCH UNITS: 10000 INJECTION, SOLUTION INTRAVENOUS; SUBCUTANEOUS at 20:13

## 2018-03-27 RX ADMIN — CHLORHEXIDINE GLUCONATE 0.12% ORAL RINSE SCH ML: 1.2 LIQUID ORAL at 20:00

## 2018-03-27 RX ADMIN — PROPOFOL PRN MLS/HR: 10 INJECTION, EMULSION INTRAVENOUS at 13:00

## 2018-03-27 RX ADMIN — SODIUM CHLORIDE SCH MLS/HR: 234 INJECTION INTRAMUSCULAR; INTRAVENOUS; SUBCUTANEOUS at 04:57

## 2018-03-27 RX ADMIN — NOREPINEPHRINE BITARTRATE PRN MLS/HR: 1 INJECTION INTRAVENOUS at 09:58

## 2018-03-27 RX ADMIN — IPRATROPIUM BROMIDE AND ALBUTEROL SULFATE SCH AMPULE: .5; 3 SOLUTION RESPIRATORY (INHALATION) at 10:16

## 2018-03-27 RX ADMIN — VANCOMYCIN HYDROCHLORIDE SCH MLS/HR: 1 INJECTION, SOLUTION INTRAVENOUS at 02:09

## 2018-03-27 RX ADMIN — IPRATROPIUM BROMIDE AND ALBUTEROL SULFATE SCH AMPULE: .5; 3 SOLUTION RESPIRATORY (INHALATION) at 15:53

## 2018-03-27 RX ADMIN — HUMAN INSULIN SCH: 100 INJECTION, SOLUTION SUBCUTANEOUS at 04:15

## 2018-03-27 RX ADMIN — IPRATROPIUM BROMIDE AND ALBUTEROL SULFATE SCH AMPULE: .5; 3 SOLUTION RESPIRATORY (INHALATION) at 21:12

## 2018-03-27 RX ADMIN — METHYLPREDNISOLONE SODIUM SUCCINATE SCH MG: 40 INJECTION, POWDER, FOR SOLUTION INTRAMUSCULAR; INTRAVENOUS at 23:14

## 2018-03-27 RX ADMIN — STANDARDIZED SENNA CONCENTRATE AND DOCUSATE SODIUM SCH TAB: 8.6; 5 TABLET, FILM COATED ORAL at 09:23

## 2018-03-27 RX ADMIN — IPRATROPIUM BROMIDE AND ALBUTEROL SULFATE SCH AMPULE: .5; 3 SOLUTION RESPIRATORY (INHALATION) at 03:09

## 2018-03-27 RX ADMIN — FAMOTIDINE SCH MG: 10 INJECTION, SOLUTION INTRAVENOUS at 09:23

## 2018-03-27 RX ADMIN — HUMAN INSULIN SCH: 100 INJECTION, SOLUTION SUBCUTANEOUS at 19:47

## 2018-03-27 RX ADMIN — HUMAN INSULIN SCH: 100 INJECTION, SOLUTION SUBCUTANEOUS at 13:01

## 2018-03-27 RX ADMIN — TAZOBACTAM SODIUM AND PIPERACILLIN SODIUM SCH MLS/HR: 500; 4 INJECTION, SOLUTION INTRAVENOUS at 16:42

## 2018-03-27 RX ADMIN — OXYTOCIN SCH MLS/HR: 10 INJECTION, SOLUTION INTRAMUSCULAR; INTRAVENOUS at 04:57

## 2018-03-27 RX ADMIN — TAZOBACTAM SODIUM AND PIPERACILLIN SODIUM SCH MLS/HR: 500; 4 INJECTION, SOLUTION INTRAVENOUS at 04:56

## 2018-03-27 RX ADMIN — NOREPINEPHRINE BITARTRATE PRN MLS/HR: 1 INJECTION INTRAVENOUS at 16:42

## 2018-03-27 RX ADMIN — MUPIROCIN CALCIUM SCH APPLIC: 20 OINTMENT TOPICAL at 20:13

## 2018-03-27 RX ADMIN — VANCOMYCIN HYDROCHLORIDE SCH MLS/HR: 1 INJECTION, SOLUTION INTRAVENOUS at 15:12

## 2018-03-27 RX ADMIN — CHLORHEXIDINE GLUCONATE SCH PACK: 500 CLOTH TOPICAL at 03:43

## 2018-03-27 RX ADMIN — HUMAN INSULIN SCH: 100 INJECTION, SOLUTION SUBCUTANEOUS at 16:43

## 2018-03-27 RX ADMIN — FAMOTIDINE SCH MG: 20 TABLET, FILM COATED ORAL at 20:13

## 2018-03-27 RX ADMIN — CHLORHEXIDINE GLUCONATE 0.12% ORAL RINSE SCH ML: 1.2 LIQUID ORAL at 09:22

## 2018-03-27 RX ADMIN — OXYTOCIN SCH MLS/HR: 10 INJECTION, SOLUTION INTRAMUSCULAR; INTRAVENOUS at 20:14

## 2018-03-27 RX ADMIN — STANDARDIZED SENNA CONCENTRATE AND DOCUSATE SODIUM SCH TAB: 8.6; 5 TABLET, FILM COATED ORAL at 20:13

## 2018-03-27 RX ADMIN — TAZOBACTAM SODIUM AND PIPERACILLIN SODIUM SCH MLS/HR: 500; 4 INJECTION, SOLUTION INTRAVENOUS at 09:23

## 2018-03-27 RX ADMIN — METHYLPREDNISOLONE SODIUM SUCCINATE SCH MG: 40 INJECTION, POWDER, FOR SOLUTION INTRAMUSCULAR; INTRAVENOUS at 05:16

## 2018-03-27 RX ADMIN — TAZOBACTAM SODIUM AND PIPERACILLIN SODIUM SCH MLS/HR: 500; 4 INJECTION, SOLUTION INTRAVENOUS at 20:12

## 2018-03-27 NOTE — HHI.CCPN
Subjective


Remarks/Hospital Course


3/27: overnight, started to become purposeful in upper extremities, and pupils 

are 3mm and now reactive. remains in shock with shock liver, acute kidney injury

, multiorgan failure. LFTs uptrending.





Objective





Vital Signs








  Date Time  Temp Pulse Resp B/P (MAP) Pulse Ox O2 Delivery O2 Flow Rate FiO2


 


3/27/18 06:05  101      


 


3/27/18 06:00   20 80/58 (65) 97   





    100/54 (69)    


 


3/27/18 05:25        70


 


3/27/18 04:30 100.1       


 


3/26/18 16:32      Ventilator  














Intake and Output   


 


 3/27/18 3/27/18 3/28/18





 08:00 16:00 00:00


 


Intake Total 350 ml  


 


Output Total 600 ml  


 


Balance -250 ml  








Result Diagram:  


3/27/18 0435                                                                   

             3/27/18 0435





Other Results





Microbiology








 Date/Time


Source Procedure


Growth Status


 


 


 3/26/18 12:48


Nasal Aspirate Influenza Types A,B Antigen (STEPHANIE) - Final


NEGATIVE FOR FLU A AND B ANTIGEN.... Complete








Laboratory Tests








Test


  3/26/18


09:15 3/26/18


18:40 3/26/18


23:15


 


Blood Gas Puncture Site LT BRACHIAL  RT FEMORAL  KAI 


 


Blood Gas Patient Temperature 98.6  37.0  37.0 


 


Blood Gas HCO3


  22 mmol/L


(22-26) 19 mmol/L


(22-26) 18 mmol/L


(22-26)


 


Blood Gas Base Excess


  -4.3 mmol/L


(-2-2) -9.1 mmol/L


(-2-2) -8.6 mmol/L


(-2-2)


 


Blood Gas Oxygen Saturation 97 % ()  90 % ()  95 % () 


 


Arterial Blood pH


  7.25


(7.380-7.420) 7.09


(7.380-7.420) 7.24


(7.380-7.420)


 


Arterial Blood Partial


Pressure CO2 52 mmHg


(38-42) 66 mmHg


(38-42) 43 mmHg


(38-42)


 


Arterial Blood Partial


Pressure O2 230 mmHg


() 84 mmHg


() 107 mmHg


()


 


Arterial Blood Oxygen Content


  16.4 Vol %


(12.0-20.0) 15.1 Vol %


(12.0-20.0) 14.8 Vol %


(12.0-20.0)


 


Arterial Blood


Carboxyhemoglobin 1.3 % (0-4) 


  1.1 % (0-4) 


  0.3 % (0-4) 


 


 


Arterial Blood Methemoglobin 1.4 % (0-2)  1.5 % (0-2)  1.1 % (0-2) 


 


Blood Gas Hemoglobin


  11.7 G/DL


(12.0-16.0) 11.8 G/DL


(12.0-16.0) 11.0 G/DL


(12.0-16.0)


 


Oxygen Delivery Device


  VENTILATOR 


  VENT PRVC/AC


MODE VENTILATOR 


 


 


Blood Gas Ventilator Setting


  PRVC//R16/P5


  RR20//PEEP8


  PRVC/AC 


 


 


Blood Gas Inspired Oxygen 100 %  100 %  90 % 








Objective Remarks


gen: elderly male, lying in bed, comatose, critically ill


heent: pupils 3mm, equal, reactive. mucous membranes moist. nc. at.


neck: no jvd. trachea midline.


chest: equal chest rise. coarse breath sounds. left subclavian cvl in place, 

site c/d/i.


cv: tachycardic rate, irregularly irregular rhythm. afib.


abd: soft, nontender, nondistended. no guarding.


extr: distal pulses 2+. no peripheral edema. right radial art line in place, 

site c/d/i.


neuro: RASS -4. GCS 6 (E1V1M4). +cough. - gag. + corneals. purposeful in the 

upper extremities, withdrawing in the lower extremities.





A/P


Assessment and Plan


Assessment: 67yM who presented in extremis and progressed rapidly to PEA arrest

, most likely secondary to septic shock. Possible sources at this point are 

pneumonic and urinary.  Now with what appears to be hypoxic ischemic 

encephalopathy superimposed on metabolic encephalopathy from septic shock. 

remains very critically ill in multiorgan failure. continue supportive care. 

Appreciate palliative care involvement.





Plan by systems:





Neurologic:


Hypoxic-ischemic encephalopathy


Metabolic encephalopathy


Frequent neuro checks


Propofol for goal RASS -2


Avoid long-acting sedatives





Respiratory:


Acute hypoxic and hypercarbic respiratory failure


Vent bundle


Head of bed at 30


Nebs


No SBT until neurologic status improves


Wean FiO2 for goal SPO2 greater than 90%





Cardiovascular:


Septic shock


Status post PEA arrest


Atrial fibrillation with rapid ventricular response


Continue IV fluids


Trend lactates


Will avoid rate controlling medicines at present given recent PA arrest and 

bradycardia


Telemetry





Renal:


Acute kidney injury-worsening


Secondary septic shock


Continue Haynes today


-- Strict I/Os





FEN/GI:


Acute protein calorie malnutrition -severe


Shock liver


Hyperkalemia


Continue n.p.o.


Trend LFTs











Heme/ID:


Septic shock


Leukocytosis


Possible urinary tract infection


Possible pneumonia


Continue vancomycin


Continue Zosyn


Continue azithromycin


Follow-up cultures


send urinary legionella and pneumococcal Ag





Endocrine:


Hypoglycemia


Secondary to shock liver


Continue D10W


Frequent glycemic checks


-- SSI





Prophylaxis:


GI Prophylaxis


IV Pepcid





DVT Prophylaxis


-- SCDs


Subcu heparin





Lines:


3/26 left subclavian triple-lumen catheter


3/26 right radial arterial line


Haynes





Dispo:


Remain in ICU.  Remains critically ill.





This patient remains critically ill with one or more organ systems which are or 

may become a threat to life.  I have spent in excess of 42 minutes 

discontinuously in the care and management of this patient.  This time is 

exclusive of procedures, and includes, but is not limited to, evaluation of the 

patient, review of the medical record, discussions with family, consultants, 

nursing staff, or respiratory therapy, and documentation in the medical record.











Earle Palacio MD Mar 27, 2018 07:36

## 2018-03-27 NOTE — EKG
Date Performed: 03/26/2018       Time Performed: 08:46:52

 

PTAGE:      67 years

 

EKG:      ATRIAL FIBRILLATION WITH RAPID VENTRICULAR RESPONSE ST DEVIATION AND MODERATE T-WAVE ABNORM
ALITY, CONSIDER LATERAL ISCHEMIA ABNORMAL ECG

 

PREVIOUS TRACING       : 02/12/2018 05.31

 

DOCTOR:   Umesh Lugo  Interpretating Date/Time  03/27/2018 00:01:06

## 2018-03-27 NOTE — ECHRPT
Indication:   S/P CARDIAC ARREST

 

 CONCLUSIONS

 Normal left ventricular size. ef @ 35=40%, ant-sep wamm appears dispropotionately hypokinetic c/w 

 remainder of lv

 Wall thickness is normal. 

 The left atrial size is mild-to-moderately dilated. 

 The right atrial size is mildly dilated. 

 No atrial level shunt is demonstrated by color flow Doppler interrogation. 

 Trace mitral valve regurgitation. 

 Aortic valve sclerosis is present. 

 Trace aortic valve regurgitation. 

 There is mild tricuspid valve regurgitation. 

 The estimated pulmonary arterial pressure is 53.8 mmHg. 

 A right sided pleural effusion is present. 

 

 BP:  80    / 58      HR: 101                      Rhythm:           Atrial fibrillation

 

 MEASUREMENTS  (Male / Female) Normal Values       Technical Quality:Fair

 2D ECHO

 LV Diastolic Diameter PLAX        4.2 cm                4.2 - 5.9 / 3.9 - 5.3 cm

 LV Systolic Diameter PLAX         3.0 cm                

 IVS Diastolic Thickness           1.0 cm                0.6 - 1.0 / 0.6 - 0.9 cm

 LVPW Diastolic Thickness          1.0 cm                0.6 - 1.0 / 0.6 - 0.9 cm

 LV Relative Wall Thickness        0.5                   

 RV Internal Dim ED PLAX           3.0 cm                

 LVOT Diameter                     2.1 cm                

 Aortic Root Diameter              3.4 cm                

 LA Systolic Diameter LX           3.8 cm                3.0 - 4.0 / 2.7 - 3.8 cm

 

 M-MODE

 AV Cusp Separation MM             1.8 cm                

 

 DOPPLER

 AV Peak Velocity                  141.5 cm/s            

 AV Peak Gradient                  8.0 mmHg              

 AV Mean Gradient                  5.5 mmHg              

 AV Velocity Time Integral         20.4 cm               

 LVOT Peak Velocity                63.1 cm/s             

 LVOT Peak Gradient                1.6 mmHg              

 LVOT Velocity Time Integral       9.2 cm                

 AV Area Cont Eq vti               1.6 cm               

 AV Area Cont Eq pk                1.5 cm               

 Mitral E Point Velocity           88.5 cm/s             

 LV E' Lateral Velocity            7.8 cm/s              

 Mitral E to LV E' Lateral Ratio   11.4                  

 LV E' Septal Velocity             5.6 cm/s              

 Mitral E to LV E' Septal Ratio    15.8                  

 TR Peak Velocity                  331.0 cm/s            

 TR Peak Gradient                  43.8 mmHg             

 Right Atrial Pressure             10.0 mmHg             

 Pulmonary Artery Systolic Pressu  53.8 mmHg             

 Right Ventricular Systolic Press  53.8 mmHg             

 PV Peak Velocity                  72.6 cm/s             

 PV Peak Gradient                  2.1 mmHg              

 

 

 FINDINGS

 

 LEFT VENTRICLE

 Normal left ventricular size. 

 Wall thickness is normal. 

 The left ventricular systolic function is normal with an estimated ejection fraction in the range of
 60-65%. 

 

 

 RIGHT VENTRICLE

 Normal right ventricular size and systolic function. 

 

 LEFT ATRIUM

 The left atrial size is mild-to-moderately dilated. 

 

 

 RIGHT ATRIUM

 The right atrial size is mildly dilated. 

 

 ATRIAL SEPTUM

 No atrial level shunt is demonstrated by color flow Doppler interrogation. 

 

 AORTA

 The aortic root and proximal ascending aorta are not well visualized. 

 

 MITRAL VALVE

 Trace mitral valve regurgitation. 

 

 AORTIC VALVE

 Aortic valve sclerosis is present. 

 Trace aortic valve regurgitation. 

 

 TRICUSPID VALVE

 There is mild tricuspid valve regurgitation. 

 The estimated pulmonary arterial pressure is 53.8 mmHg. 

 

 PULMONARY VALVE

 No pulmonary valve regurgitation or stenosis. 

 

 PERICARDIUM

 No pericardial effusion. 

 A right sided pleural effusion is present. 

 

 

 

 

  Bobby Trejo MD, FACC, [facility]

  (Electronically Signed)

  Final Date:27 March 2018 14:30

## 2018-03-27 NOTE — HHI.HCSW
Visit


Advance Directive


Attempted to contact karol Guerrero-- number in EMR continues to ring 

to [place], mailbox full unable to leave a message. Telephone call to sister 

Katelynn Jiménez-- number in EMR just rings, unable to leave message. 





Google search produced possible number for karol Yeboah-- [phone]-- not 

a working number, another possible number [phone]-- rings busy. 


Google search unable to locate additional contact for sister Katelynn. 





No potential matches found via social media. 





[employer] requested, results:


*POSSIBLE MICHAEL????


ESTELA VIDES


: 1955


: 1946


Age: 62


Gender: Female


NO PHONE LISTED AND NO RECORDS TO PURCHASE.


USING HER RELATIVES LIST, POSSIBLE PHONE NUMBER FOR RANDAL VIDES~ [phone]


***FOUND NO KATELYNN TINO, [place]~~ TO MANY RECORDS FOR TEJAS JIMÉNEZ.~ FOUND A ANJEL 

WOOD AGE 82 .





Attempted to contact possible relative Randal Vides [phone]-- number 

rings to [employer] company


--Unable to narrow additional contact information on google search. Unable to 

identify match on facebook. 





**At this time seems appropriate to proceed with social work advantage as no 

family/relatives able to be identified, contacted.


.


Follow Up Visit


Palliative care consulted to assist with goals of care. Goals to be determined 

pending securing health care proxy decision maker, possible social work 

advantage. 


.











Denisse Trotter, KAYLAW Mar 27, 2018 12:30

## 2018-03-28 VITALS
OXYGEN SATURATION: 99 % | SYSTOLIC BLOOD PRESSURE: 116 MMHG | HEART RATE: 94 BPM | TEMPERATURE: 98.3 F | DIASTOLIC BLOOD PRESSURE: 60 MMHG | RESPIRATION RATE: 15 BRPM

## 2018-03-28 VITALS
OXYGEN SATURATION: 98 % | DIASTOLIC BLOOD PRESSURE: 60 MMHG | RESPIRATION RATE: 22 BRPM | SYSTOLIC BLOOD PRESSURE: 110 MMHG | HEART RATE: 96 BPM

## 2018-03-28 VITALS
RESPIRATION RATE: 7 BRPM | DIASTOLIC BLOOD PRESSURE: 64 MMHG | HEART RATE: 96 BPM | OXYGEN SATURATION: 99 % | SYSTOLIC BLOOD PRESSURE: 110 MMHG

## 2018-03-28 VITALS
OXYGEN SATURATION: 99 % | SYSTOLIC BLOOD PRESSURE: 128 MMHG | HEART RATE: 98 BPM | DIASTOLIC BLOOD PRESSURE: 66 MMHG | RESPIRATION RATE: 19 BRPM

## 2018-03-28 VITALS
RESPIRATION RATE: 16 BRPM | SYSTOLIC BLOOD PRESSURE: 110 MMHG | OXYGEN SATURATION: 98 % | DIASTOLIC BLOOD PRESSURE: 62 MMHG | HEART RATE: 94 BPM

## 2018-03-28 VITALS
OXYGEN SATURATION: 99 % | HEART RATE: 94 BPM | SYSTOLIC BLOOD PRESSURE: 84 MMHG | RESPIRATION RATE: 5 BRPM | DIASTOLIC BLOOD PRESSURE: 50 MMHG

## 2018-03-28 VITALS
RESPIRATION RATE: 19 BRPM | DIASTOLIC BLOOD PRESSURE: 68 MMHG | SYSTOLIC BLOOD PRESSURE: 120 MMHG | HEART RATE: 86 BPM | OXYGEN SATURATION: 99 %

## 2018-03-28 VITALS
RESPIRATION RATE: 13 BRPM | HEART RATE: 92 BPM | SYSTOLIC BLOOD PRESSURE: 112 MMHG | OXYGEN SATURATION: 99 % | DIASTOLIC BLOOD PRESSURE: 66 MMHG

## 2018-03-28 VITALS
HEART RATE: 96 BPM | RESPIRATION RATE: 18 BRPM | DIASTOLIC BLOOD PRESSURE: 66 MMHG | SYSTOLIC BLOOD PRESSURE: 120 MMHG | OXYGEN SATURATION: 90 %

## 2018-03-28 VITALS
HEART RATE: 100 BPM | DIASTOLIC BLOOD PRESSURE: 62 MMHG | SYSTOLIC BLOOD PRESSURE: 112 MMHG | RESPIRATION RATE: 20 BRPM | OXYGEN SATURATION: 98 %

## 2018-03-28 VITALS
RESPIRATION RATE: 6 BRPM | HEART RATE: 96 BPM | DIASTOLIC BLOOD PRESSURE: 66 MMHG | OXYGEN SATURATION: 99 % | SYSTOLIC BLOOD PRESSURE: 106 MMHG

## 2018-03-28 VITALS
OXYGEN SATURATION: 98 % | RESPIRATION RATE: 19 BRPM | SYSTOLIC BLOOD PRESSURE: 110 MMHG | HEART RATE: 94 BPM | DIASTOLIC BLOOD PRESSURE: 58 MMHG

## 2018-03-28 VITALS
RESPIRATION RATE: 16 BRPM | SYSTOLIC BLOOD PRESSURE: 106 MMHG | OXYGEN SATURATION: 99 % | DIASTOLIC BLOOD PRESSURE: 60 MMHG | HEART RATE: 88 BPM

## 2018-03-28 VITALS
HEART RATE: 94 BPM | OXYGEN SATURATION: 98 % | RESPIRATION RATE: 20 BRPM | SYSTOLIC BLOOD PRESSURE: 126 MMHG | DIASTOLIC BLOOD PRESSURE: 66 MMHG

## 2018-03-28 VITALS
OXYGEN SATURATION: 99 % | HEART RATE: 96 BPM | RESPIRATION RATE: 5 BRPM | DIASTOLIC BLOOD PRESSURE: 58 MMHG | SYSTOLIC BLOOD PRESSURE: 102 MMHG

## 2018-03-28 VITALS
HEART RATE: 88 BPM | TEMPERATURE: 99.1 F | RESPIRATION RATE: 19 BRPM | SYSTOLIC BLOOD PRESSURE: 134 MMHG | DIASTOLIC BLOOD PRESSURE: 72 MMHG | OXYGEN SATURATION: 100 %

## 2018-03-28 VITALS
DIASTOLIC BLOOD PRESSURE: 60 MMHG | SYSTOLIC BLOOD PRESSURE: 110 MMHG | OXYGEN SATURATION: 95 % | RESPIRATION RATE: 19 BRPM | HEART RATE: 124 BPM

## 2018-03-28 VITALS
RESPIRATION RATE: 19 BRPM | HEART RATE: 96 BPM | DIASTOLIC BLOOD PRESSURE: 60 MMHG | OXYGEN SATURATION: 99 % | SYSTOLIC BLOOD PRESSURE: 114 MMHG

## 2018-03-28 VITALS — OXYGEN SATURATION: 99 %

## 2018-03-28 VITALS
DIASTOLIC BLOOD PRESSURE: 66 MMHG | HEART RATE: 92 BPM | SYSTOLIC BLOOD PRESSURE: 122 MMHG | RESPIRATION RATE: 19 BRPM | OXYGEN SATURATION: 100 %

## 2018-03-28 VITALS
OXYGEN SATURATION: 98 % | RESPIRATION RATE: 16 BRPM | HEART RATE: 90 BPM | DIASTOLIC BLOOD PRESSURE: 64 MMHG | SYSTOLIC BLOOD PRESSURE: 112 MMHG

## 2018-03-28 VITALS
HEART RATE: 98 BPM | DIASTOLIC BLOOD PRESSURE: 64 MMHG | SYSTOLIC BLOOD PRESSURE: 112 MMHG | OXYGEN SATURATION: 95 % | RESPIRATION RATE: 19 BRPM

## 2018-03-28 VITALS
HEART RATE: 88 BPM | OXYGEN SATURATION: 99 % | RESPIRATION RATE: 20 BRPM | SYSTOLIC BLOOD PRESSURE: 118 MMHG | DIASTOLIC BLOOD PRESSURE: 62 MMHG

## 2018-03-28 VITALS
RESPIRATION RATE: 9 BRPM | HEART RATE: 92 BPM | OXYGEN SATURATION: 100 % | DIASTOLIC BLOOD PRESSURE: 58 MMHG | SYSTOLIC BLOOD PRESSURE: 108 MMHG

## 2018-03-28 VITALS
HEART RATE: 86 BPM | RESPIRATION RATE: 20 BRPM | SYSTOLIC BLOOD PRESSURE: 108 MMHG | DIASTOLIC BLOOD PRESSURE: 58 MMHG | OXYGEN SATURATION: 99 %

## 2018-03-28 VITALS
DIASTOLIC BLOOD PRESSURE: 66 MMHG | SYSTOLIC BLOOD PRESSURE: 126 MMHG | OXYGEN SATURATION: 97 % | RESPIRATION RATE: 19 BRPM | HEART RATE: 90 BPM

## 2018-03-28 VITALS — OXYGEN SATURATION: 98 %

## 2018-03-28 VITALS
HEART RATE: 121 BPM | RESPIRATION RATE: 13 BRPM | TEMPERATURE: 98.4 F | DIASTOLIC BLOOD PRESSURE: 62 MMHG | OXYGEN SATURATION: 93 % | SYSTOLIC BLOOD PRESSURE: 104 MMHG

## 2018-03-28 VITALS
SYSTOLIC BLOOD PRESSURE: 116 MMHG | HEART RATE: 88 BPM | RESPIRATION RATE: 19 BRPM | DIASTOLIC BLOOD PRESSURE: 66 MMHG | OXYGEN SATURATION: 95 %

## 2018-03-28 VITALS
SYSTOLIC BLOOD PRESSURE: 122 MMHG | HEART RATE: 94 BPM | RESPIRATION RATE: 13 BRPM | DIASTOLIC BLOOD PRESSURE: 72 MMHG | OXYGEN SATURATION: 100 %

## 2018-03-28 VITALS
HEART RATE: 90 BPM | OXYGEN SATURATION: 98 % | SYSTOLIC BLOOD PRESSURE: 110 MMHG | RESPIRATION RATE: 20 BRPM | DIASTOLIC BLOOD PRESSURE: 60 MMHG

## 2018-03-28 VITALS
RESPIRATION RATE: 20 BRPM | DIASTOLIC BLOOD PRESSURE: 60 MMHG | OXYGEN SATURATION: 99 % | HEART RATE: 90 BPM | SYSTOLIC BLOOD PRESSURE: 112 MMHG

## 2018-03-28 VITALS
OXYGEN SATURATION: 100 % | DIASTOLIC BLOOD PRESSURE: 68 MMHG | SYSTOLIC BLOOD PRESSURE: 118 MMHG | HEART RATE: 90 BPM | RESPIRATION RATE: 19 BRPM

## 2018-03-28 VITALS
RESPIRATION RATE: 9 BRPM | DIASTOLIC BLOOD PRESSURE: 64 MMHG | HEART RATE: 90 BPM | SYSTOLIC BLOOD PRESSURE: 110 MMHG | OXYGEN SATURATION: 99 %

## 2018-03-28 VITALS
OXYGEN SATURATION: 99 % | HEART RATE: 94 BPM | DIASTOLIC BLOOD PRESSURE: 56 MMHG | SYSTOLIC BLOOD PRESSURE: 104 MMHG | RESPIRATION RATE: 19 BRPM

## 2018-03-28 VITALS — HEART RATE: 96 BPM

## 2018-03-28 VITALS — OXYGEN SATURATION: 96 %

## 2018-03-28 VITALS
SYSTOLIC BLOOD PRESSURE: 120 MMHG | HEART RATE: 92 BPM | RESPIRATION RATE: 18 BRPM | OXYGEN SATURATION: 99 % | DIASTOLIC BLOOD PRESSURE: 66 MMHG

## 2018-03-28 LAB
ALBUMIN SERPL-MCNC: 1.7 GM/DL (ref 3.4–5)
ALP SERPL-CCNC: 77 U/L (ref 45–117)
ALT SERPL-CCNC: 2260 U/L (ref 12–78)
AST SERPL-CCNC: 847 U/L (ref 15–37)
BILIRUB SERPL-MCNC: 1.2 MG/DL (ref 0.2–1)
BUN SERPL-MCNC: 50 MG/DL (ref 7–18)
CALCIUM SERPL-MCNC: 6.9 MG/DL (ref 8.5–10.1)
CALCIUM TP COR SERPL-MCNC: 8.4 MG/DL (ref 8.5–10.1)
CHLORIDE SERPL-SCNC: 110 MEQ/L (ref 98–107)
CREAT SERPL-MCNC: 3.1 MG/DL (ref 0.6–1.3)
ERYTHROCYTE [DISTWIDTH] IN BLOOD BY AUTOMATED COUNT: 23 % (ref 11.6–17.2)
GFR SERPLBLD BASED ON 1.73 SQ M-ARVRAT: 20 ML/MIN (ref 89–?)
GLUCOSE SERPL-MCNC: 146 MG/DL (ref 74–106)
HCO3 BLD-SCNC: 17.2 MEQ/L (ref 21–32)
HCT VFR BLD CALC: 28 % (ref 39–51)
HGB BLD-MCNC: 9.1 GM/DL (ref 13–17)
INR PPP: 1.3 RATIO
MCH RBC QN AUTO: 24.5 PG (ref 27–34)
MCHC RBC AUTO-ENTMCNC: 32.6 % (ref 32–36)
MCV RBC AUTO: 75.2 FL (ref 80–100)
PLATELET # BLD: 112 TH/MM3 (ref 150–450)
PMV BLD AUTO: 9 FL (ref 7–11)
PROT SERPL-MCNC: 4.4 GM/DL (ref 6.4–8.2)
PROTHROMBIN TIME: 12.7 SEC (ref 9.8–11.6)
RBC # BLD AUTO: 3.73 MIL/MM3 (ref 4.5–5.9)
SODIUM SERPL-SCNC: 139 MEQ/L (ref 136–145)
WBC # BLD AUTO: 18 TH/MM3 (ref 4–11)

## 2018-03-28 RX ADMIN — TAZOBACTAM SODIUM AND PIPERACILLIN SODIUM SCH MLS/HR: 250; 2 INJECTION, SOLUTION INTRAVENOUS at 15:00

## 2018-03-28 RX ADMIN — VANCOMYCIN HYDROCHLORIDE SCH MLS/HR: 1 INJECTION, SOLUTION INTRAVENOUS at 14:54

## 2018-03-28 RX ADMIN — VANCOMYCIN HYDROCHLORIDE SCH MLS/HR: 1 INJECTION, SOLUTION INTRAVENOUS at 02:58

## 2018-03-28 RX ADMIN — SODIUM CHLORIDE SCH MLS/HR: 234 INJECTION INTRAMUSCULAR; INTRAVENOUS; SUBCUTANEOUS at 02:58

## 2018-03-28 RX ADMIN — HUMAN INSULIN SCH: 100 INJECTION, SOLUTION SUBCUTANEOUS at 00:52

## 2018-03-28 RX ADMIN — HUMAN INSULIN SCH: 100 INJECTION, SOLUTION SUBCUTANEOUS at 08:30

## 2018-03-28 RX ADMIN — TAZOBACTAM SODIUM AND PIPERACILLIN SODIUM SCH MLS/HR: 500; 4 INJECTION, SOLUTION INTRAVENOUS at 04:16

## 2018-03-28 RX ADMIN — CHLORHEXIDINE GLUCONATE 0.12% ORAL RINSE SCH ML: 1.2 LIQUID ORAL at 20:54

## 2018-03-28 RX ADMIN — OXYTOCIN SCH MLS/HR: 10 INJECTION, SOLUTION INTRAMUSCULAR; INTRAVENOUS at 00:49

## 2018-03-28 RX ADMIN — IPRATROPIUM BROMIDE AND ALBUTEROL SULFATE SCH AMPULE: .5; 3 SOLUTION RESPIRATORY (INHALATION) at 15:52

## 2018-03-28 RX ADMIN — CHLORHEXIDINE GLUCONATE 0.12% ORAL RINSE SCH ML: 1.2 LIQUID ORAL at 08:00

## 2018-03-28 RX ADMIN — MUPIROCIN CALCIUM SCH APPLIC: 20 OINTMENT TOPICAL at 20:55

## 2018-03-28 RX ADMIN — HUMAN INSULIN SCH: 100 INJECTION, SOLUTION SUBCUTANEOUS at 21:42

## 2018-03-28 RX ADMIN — HUMAN INSULIN SCH: 100 INJECTION, SOLUTION SUBCUTANEOUS at 12:15

## 2018-03-28 RX ADMIN — METHYLPREDNISOLONE SODIUM SUCCINATE SCH MG: 40 INJECTION, POWDER, FOR SOLUTION INTRAMUSCULAR; INTRAVENOUS at 05:52

## 2018-03-28 RX ADMIN — PROPOFOL PRN MLS/HR: 10 INJECTION, EMULSION INTRAVENOUS at 10:12

## 2018-03-28 RX ADMIN — CHLORHEXIDINE GLUCONATE SCH PACK: 500 CLOTH TOPICAL at 04:00

## 2018-03-28 RX ADMIN — IPRATROPIUM BROMIDE AND ALBUTEROL SULFATE SCH AMPULE: .5; 3 SOLUTION RESPIRATORY (INHALATION) at 03:23

## 2018-03-28 RX ADMIN — TAZOBACTAM SODIUM AND PIPERACILLIN SODIUM SCH MLS/HR: 500; 4 INJECTION, SOLUTION INTRAVENOUS at 09:00

## 2018-03-28 RX ADMIN — IPRATROPIUM BROMIDE AND ALBUTEROL SULFATE SCH AMPULE: .5; 3 SOLUTION RESPIRATORY (INHALATION) at 11:06

## 2018-03-28 RX ADMIN — MUPIROCIN CALCIUM SCH APPLIC: 20 OINTMENT TOPICAL at 09:00

## 2018-03-28 RX ADMIN — DIAZEPAM SCH MG: 10 TABLET ORAL at 14:54

## 2018-03-28 RX ADMIN — STANDARDIZED SENNA CONCENTRATE AND DOCUSATE SODIUM SCH TAB: 8.6; 5 TABLET, FILM COATED ORAL at 09:00

## 2018-03-28 RX ADMIN — DIAZEPAM SCH MG: 10 TABLET ORAL at 20:55

## 2018-03-28 RX ADMIN — Medication PRN MLS/HR: at 21:34

## 2018-03-28 RX ADMIN — METHYLPREDNISOLONE SODIUM SUCCINATE SCH MG: 40 INJECTION, POWDER, FOR SOLUTION INTRAMUSCULAR; INTRAVENOUS at 14:53

## 2018-03-28 RX ADMIN — HUMAN INSULIN SCH: 100 INJECTION, SOLUTION SUBCUTANEOUS at 16:15

## 2018-03-28 RX ADMIN — FAMOTIDINE SCH MG: 20 TABLET, FILM COATED ORAL at 20:55

## 2018-03-28 RX ADMIN — HUMAN INSULIN SCH: 100 INJECTION, SOLUTION SUBCUTANEOUS at 04:15

## 2018-03-28 RX ADMIN — IPRATROPIUM BROMIDE AND ALBUTEROL SULFATE SCH AMPULE: .5; 3 SOLUTION RESPIRATORY (INHALATION) at 22:02

## 2018-03-28 RX ADMIN — DIAZEPAM SCH MG: 10 TABLET ORAL at 08:30

## 2018-03-28 RX ADMIN — STANDARDIZED SENNA CONCENTRATE AND DOCUSATE SODIUM SCH TAB: 8.6; 5 TABLET, FILM COATED ORAL at 20:55

## 2018-03-28 RX ADMIN — TAZOBACTAM SODIUM AND PIPERACILLIN SODIUM SCH MLS/HR: 250; 2 INJECTION, SOLUTION INTRAVENOUS at 20:55

## 2018-03-28 RX ADMIN — HUMAN INSULIN SCH: 100 INJECTION, SOLUTION SUBCUTANEOUS at 23:35

## 2018-03-28 RX ADMIN — AZITHROMYCIN SCH MLS/HR: 500 INJECTION, POWDER, LYOPHILIZED, FOR SOLUTION INTRAVENOUS at 10:00

## 2018-03-28 RX ADMIN — NOREPINEPHRINE BITARTRATE PRN MLS/HR: 1 INJECTION INTRAVENOUS at 00:40

## 2018-03-28 RX ADMIN — METHYLPREDNISOLONE SODIUM SUCCINATE SCH MG: 40 INJECTION, POWDER, FOR SOLUTION INTRAMUSCULAR; INTRAVENOUS at 23:22

## 2018-03-28 RX ADMIN — FAMOTIDINE SCH MG: 20 TABLET, FILM COATED ORAL at 09:00

## 2018-03-28 NOTE — HHI.CCPN
Subjective


Remarks/Hospital Course


3/27: overnight, started to become purposeful in upper extremities, and pupils 

are 3mm and now reactive. remains in shock with shock liver, acute kidney injury

, multiorgan failure. LFTs uptrending.





3/28: improvement in mental status. now following commands. discussion with 

sister: patient drinks heavily: 2-3 6-packs/day. liver enzymes still elevated, 

remains oliguric with Cr > 3 now and rising. albumin continues to fall which is 

likely combination of very poor nutritional status and poor liver synthetic 

function. blood sugar slightly improved. overall still in multiorgan failure. 

family appears to have poor insight into patient's condition: sister herself is 

going through rehab for a recent severe illness. now also starting tremors 

which may be early alcohol withdraw given new clinical history to suggest large 

etoh intake.





Objective





Vital Signs








  Date Time  Temp Pulse Resp B/P (MAP) Pulse Ox O2 Delivery O2 Flow Rate FiO2


 


3/28/18 06:08  96      


 


3/28/18 06:00   20 110/60 (77) 98   


 


3/28/18 04:15 99.1       


 


3/28/18 04:00        40


 


3/26/18 16:32      Ventilator  














Intake and Output   


 


 3/28/18 3/28/18 3/29/18





 08:00 16:00 00:00


 


Intake Total 999 ml  


 


Output Total 400 ml  


 


Balance 599 ml  








Result Diagram:  


3/28/18 0427                                                                   

             3/28/18 0427





Other Results





Microbiology








 Date/Time


Source Procedure


Growth Status


 


 


 3/26/18 12:48


Nasal Aspirate Influenza Types A,B Antigen (STEPHANIE) - Final


NEGATIVE FOR FLU A AND B ANTIGEN.... Complete





 3/27/18 09:30


Urine Catheterized Urine Legionella Antigen - Final


PRESUMPTIVE NEGATIVE FOR LEGIONELLA P... Complete


 


 3/27/18 09:30


Urine Catheterized Urine Streptococcus pneumoniae Antigen (M - Final


PRESUMPTIVE NEGATIVE FOR STREPTOCOCCU... Complete








Laboratory Tests








Test


  3/27/18


07:30


 


Blood Gas Puncture Site ART LINE 


 


Blood Gas Patient Temperature 37.0 


 


Blood Gas HCO3


  16 mmol/L


(22-26)


 


Blood Gas Base Excess


  -9.4 mmol/L


(-2-2)


 


Blood Gas Oxygen Saturation 96 % () 


 


Arterial Blood pH


  7.27


(7.380-7.420)


 


Arterial Blood Partial


Pressure CO2 37 mmHg


(38-42)


 


Arterial Blood Partial


Pressure O2 156 mmHg


()


 


Arterial Blood Oxygen Content


  12.6 Vol %


(12.0-20.0)


 


Arterial Blood


Carboxyhemoglobin 1.2 % (0-4) 


 


 


Arterial Blood Methemoglobin 1.5 % (0-2) 


 


Blood Gas Hemoglobin


  9.1 G/DL


(12.0-16.0)


 


Oxygen Delivery Device


  VENT PRVC/AC


MODE


 


Blood Gas Ventilator Setting


  /RR20/PEEP


8


 


Blood Gas Inspired Oxygen 70 % 








Objective Remarks


gen: elderly male, lying in bed, intubated, sedated


heent: pupils 3mm, equal, reactive. mucous membranes moist. nc. at.


neck: no jvd. trachea midline.


chest: equal chest rise. coarse breath sounds. left subclavian cvl in place, 

site c/d/i.


cv: tachycardic rate, irregularly irregular rhythm. afib.


abd: soft, nontender, nondistended. no guarding.


extr: distal pulses 2+. no peripheral edema. right radial art line in place, 

site c/d/i.


neuro: RASS -2. GCS 10 ([id number]). +cough. + gag. + corneals. follows commands.





A/P


Assessment and Plan


Assessment: 67yM who presented in extremis and progressed rapidly to PEA arrest

, most likely secondary to septic shock. Possible sources at this point are 

pneumonic and urinary.  Remains in septic shock with multiorgan failure. by 

pulse contour analysis, cardiac output is adequate. remains on vasopressors. 

multiple organs still involved, including worsening renal failure and 

persistent shock liver. Chronic etoh history will complicate clinical course. 

remains very critically ill, off pathway. Again, prognosis is poor and I 

appreciate palliative care input.





Plan by systems:





Neurologic:


Hypoxic-ischemic encephalopathy


Metabolic encephalopathy - improving


Alcohol Dependence


Alcohol withdraw syndrome


Frequent neuro checks


Propofol for goal RASS -2


start iv thiamine, mvi


valium 10mg per tube q8h


would be a good candidate for clonidine, but shock prevents this currently





Respiratory:


Acute hypoxic and hypercarbic respiratory failure - persistent.


Vent bundle


Head of bed at 30


Nebs


remains with severe metabolic acidosis. unable to start SBT due to acidosis.


Wean FiO2 for goal SPO2 greater than 90%





Cardiovascular:


Septic shock - persistent.


Status post PEA arrest


Atrial fibrillation with rapid ventricular response


d/c LR.


Will avoid rate controlling medicines at present given recent PA arrest and 

bradycardia


Telemetry


continue levophed for goal map > 65 mmHg.





Renal:


Acute kidney injury-worsening


Secondary septic shock


Continue Haynes today


order renal ultrasound to rule out obstructive uropathy.


-- Strict I/Os





FEN/GI:


Acute protein calorie malnutrition -severe


Shock liver


Hyperkalemia


start tube feeds


Trend LFTs


nutrition consult





Heme/ID:


Septic shock


Leukocytosis


Possible urinary tract infection


Possible pneumonia


Continue vancomycin


Continue Zosyn


Continue azithromycin


Follow-up cultures


urinary legionella and pneumococcal Ag: negative.





Endocrine:


Hypoglycemia- improving.


Secondary to shock liver


Continue D10W


-- SSI





Prophylaxis:


GI Prophylaxis


IV Pepcid





DVT Prophylaxis


-- SCDs


needs full anticoagulation for afib, but worsening renal function is 

concerning. will start heparin drip and titrate as needed. can transition back 

to Xarelto if renal function improves.





Lines:


3/26 left subclavian triple-lumen catheter


3/26 right radial arterial line


Haynes





Dispo:


Remain in ICU.  Remains critically ill.





This patient remains critically ill with one or more organ systems which are or 

may become a threat to life.  I have spent in excess of 49 minutes 

discontinuously in the care and management of this patient.  This time is 

exclusive of procedures, and includes, but is not limited to, evaluation of the 

patient, review of the medical record, discussions with family, consultants, 

nursing staff, or respiratory therapy, and documentation in the medical record.











Earle Palacio MD Mar 28, 2018 07:02

## 2018-03-28 NOTE — RADRPT
EXAM DATE/TIME:  03/28/2018 08:50 

 

[facility] COMPARISON:     

CHEST SINGLE AP, March 26, 2018, 20:45.  CT ABDOMEN & PELVIS W CONTRAST, April 08, 2017, 5:03.

        

 

 

INDICATIONS :     

Increased BUN/Creatinine. 

                     

 

MEDICAL HISTORY :           

Deep venous thrombosis. Hypertension. Chronic obstructive pulmonary disease. Hearing loss. Cardiac di
sorders. Irregular heartbeat. Pulmonary embolism. Dyspnea. GERD. Renal disease. Diabetes. Depression.
 Tobacco use. Anxiety. Anticoagulant therapy.

 

SURGICAL HISTORY :          

Tonsillectomy. Appendectomy. Right ankle surgery.

 

ENCOUNTER:     

Subsequent

 

ACUITY:     

1 day

 

PAIN SCORE:     

Nonresponsive.

 

LOCATION:     

Bilateral flank 

MEASUREMENTS:     

 

RIGHT KIDNEY:     

12.1 x 4.5 x 4.8 cm

 

LEFT KIDNEY:     

10.5 x 4.7 x 4.4 cm

 

FINDINGS:     

 

RIGHT KIDNEY:     

There is increased echogenicity of the renal parenchyma. There is no evidence of hydronephrosis. Ther
e is some mild perinephric edema.

 

LEFT KIDNEY:     

There is increased echogenicity of the renal parenchyma. There is no evidence of hydronephrosis. Ther
e is some mild perinephric edema. There appears to be a left-sided pleural effusion.

 

BLADDER:     

Haynes catheter in a decompressed urinary bladder.

 

CONCLUSION:     

1. No evidence of hydronephrosis.

2. Increased echogenicity of the renal parenchyma bilaterally suggestive of chronic medical renal dis
ease.

 

 

 

 Hermelindo Rodriguez MD on March 28, 2018 at 9:47           

Board Certified Radiologist.

 This report was verified electronically.

## 2018-03-28 NOTE — HHI.HCPN
Spoke with nurse to obtain additional contact information for sister, not 

currently updated in EMR. Sent request for contact information to be updated in 

EMR. 





Spoke with sister. She states she will not be available to meet with palliative 

care today as she is about to leave the hospital. She states she will be back 

tomorrow, Thursday 3/29 and will be staying most of the day. Palliative care to 

meet with her tomorrow. 





Palliative care will continue to follow throughout hospitalization.











Denisse Trotter, KAYLAW Mar 28, 2018 12:21

## 2018-03-28 NOTE — HHI.HCPN
Reason for visit


   a.  To assist with evaluation and management of symptoms including:  dyspnea

; encephalopathy, pain, anxiety


   b.  To assist medical decision maker(s) with: better understanding of 

current medical conditions; weighing benefits/burdens of medical treatment 

options; making  medical treatment decisions.


.





Subjective/Interval History


Patient seen to follow-up on symptom management and goals of care.





Sister's telephone number was updated today and she was contacted by the 

palliative care , Denisse Trotter.  She is not available today for a 

face-to-face meeting but will be at the bedside all day tomorrow and will be 

happy to receive the palliative care update either by phone or in person.





Clinical data:


* Laboratory: PT 12.7, INR 1.3, APTT 40.1 (on heparin), WBC 18.0, hemoglobin 9.1

, hematocrit 28.0, platelets 112, sodium 139, potassium 4.6, chloride 110, 

carbon dioxide 17.2, BUN 50 (37), creatinine 3.10 (2.30)  (1496), ALT 

2260 Ahrendt disease 2117), total protein 4.4, albumin 1.7, ABG pH 7.29, PCO2 35

, PO2 113, HCO3 16, base excess -9.2, saturation 95%.


* Microbiology: Blood culture shows gram-positive cocci, final and sensitivity 

pending.


* Radiology: Ultrasound of the kidneys show increased echogenicity of the renal 

parenchyma with no evidence of hydronephrosis and mild perinephric edema 

bilaterally.  Left-sided pleural effusion is noted.





Patient is seen in room 8407, ICU, intubated, sedated.  When on sedation 

vacation this morning he was showing some improvement.  Has been seen with some 

purposeful movement in the upper extremities.  Following commands occasionally 

when sedation is off.  Remains critically ill in multisystem organ failure, 

respiratory failure, shock liver and acute kidney injury.


.


Family/friend interactions


Spoke with [facility] where patient had resided several times.  He 

was there for short-term rehab and then went home but returned to the SNF 3/15/

2018 with multiple bruises noted per the staff.  There is no incident report of 

patient falls since that time.  His sister had reported that he was a heavy 

drinker of 2-3 6 packs per day however has been in the SNF for the last 2 weeks 

where he has not had any alcohol.  They report he does have a chronic anxiety 

disorder and takes a benzodiazepine twice daily and as needed.  He was 

previously ambulatory but noncompliant with using his walker.





Spoke with his sister Serene at length regarding past social, psychosocial 

and medical history.  She is in contact with the patient's daughter, Edilia, who 

lives in [place] as well as the patient's estranged son, who changed his name to 

Reid Ryan, but still contacts his aunt.  Serene states that she has spoken 

with both children regarding the patient's hospitalization and critical 

condition.  We will continue this conversation as clinical course develops.  

She wishes to continue his current CODE STATUS pending that discussion with the 

children.


.





Advance Directives


Living Will:  Copy in medical record


Health Care Surrogate:  Copy in medical record


Durable Power of :  Copy in medical record (DURABLE POWER OF  

for healthcare)


Advance Directive Specifics


Date completed:


2011


.


Health Care Surrogate(s):


He has named his sister Cee Dejesus as his primary healthcare surrogate and 

his other sister Sue Womack as his alternate


.


Documented care wishes:


Living will on chart.


.





Objective





Vital Signs








  Date Time  Temp Pulse Resp B/P (MAP) Pulse Ox O2 Delivery O2 Flow Rate FiO2


 


3/28/18 11:05     99   35


 


3/28/18 07:24     99   40


 


3/28/18 06:08  96      


 


3/28/18 06:00  90 20 110/60 (77) 98   


 


3/28/18 05:30  90 20 112/60 (77) 99   


 


3/28/18 05:00  86 20 108/58 (75) 99   


 


3/28/18 04:45  94 19 104/56 (72) 99   


 


3/28/18 04:16  84  111/55    


 


3/28/18 04:15 99.1 88 19 134/72 (92) 100   


 


3/28/18 04:00  108      


 


3/28/18 04:00        40


 


3/28/18 04:00  92 9 108/58 (75) 100   


 


3/28/18 03:45  92 19 122/66 (84) 100   


 


3/28/18 03:30  92 18 120/66 (84) 99   


 


3/28/18 03:25     98   40


 


3/28/18 03:15  96 18 120/66 (84) 90   


 


3/28/18 03:00  94 20 126/66 (86) 98   


 


3/28/18 02:45  90 19 126/66 (86) 97   


 


3/28/18 02:30  96 22 110/60 (77) 98   


 


3/28/18 02:00  98 19 128/66 (86) 99   


 


3/28/18 02:00  80      


 


3/28/18 01:30  96 19 114/60 (78) 99   


 


3/28/18 01:00 98.3 94 15 116/60 (78) 99   


 


3/28/18 00:40  95  109/64    


 


3/28/18 00:30  88 20 118/62 (80) 99   


 


3/28/18 00:00  94 19 110/58 (75) 98   


 


3/28/18 00:00  98      


 


3/28/18 00:00        40


 


3/27/18 23:45     98   40


 


3/27/18 23:30  94 19 104/56 (72) 98   


 


3/27/18 23:15  96 20 110/56 (74) 97   


 


3/27/18 23:00  94 19 138/70 (92) 97   


 


3/27/18 22:45  94 19 146/74 (98) 99   


 


3/27/18 22:30  94 20 152/74 (100) 99   


 


3/27/18 22:15  92 20 142/74 (96) 99   


 


3/27/18 22:00  92      


 


3/27/18 22:00  94 19 142/72 (95) 98   


 


3/27/18 21:55  96 20 140/72 (94) 99   


 


3/27/18 21:55  96 20 140/72 (94) 99   


 


3/27/18 20:15  116 19 108/58 (75) 96   


 


3/27/18 20:00  116      


 


3/27/18 20:00  108 22 110/58 (75) 97   


 


3/27/18 20:00        40


 


3/27/18 20:00     96   40


 


3/27/18 19:48  108  142/74    


 


3/27/18 19:45  102 20 120/62 (81) 97   


 


3/27/18 19:30  102 19 134/66 (88) 97   


 


3/27/18 19:15  104 18 124/62 (82) 99   


 


3/27/18 19:00  102 19 134/64 (87) 97   


 


3/27/18 18:00  106 20 122/58 (79) 96   


 


3/27/18 18:00  106      


 


3/27/18 17:45  114 26 118/62 (80) 93   


 


3/27/18 17:30  110 20 126/62 (83) 97   


 


3/27/18 17:15  108 19 124/60 (81) 96   


 


3/27/18 17:05     96   40


 


3/27/18 17:00  106 20 132/64 (86) 95   


 


3/27/18 17:00  106 20 132/64 (86) 95   


 


3/27/18 16:45  108 11 64/36 (45) 99   


 


3/27/18 16:42  106  61/36    


 


3/27/18 16:30  104 20 130/64 (86) 98   


 


3/27/18 16:15  106 19 136/66 (89) 98   


 


3/27/18 16:00        40


 


3/27/18 16:00  114      


 


3/27/18 16:00 99.3 114 15 128/64 (85) 98   


 


3/27/18 15:45  108 19 124/64 (84) 96   


 


3/27/18 15:30  110 20 130/64 (86) 97   


 


3/27/18 15:15  112 20 128/64 (85) 97   


 


3/27/18 15:00  108 19 118/58 (78) 96   


 


3/27/18 14:45  112 20 114/56 (75) 96   














Intake & Output  


 


 3/28/18 3/28/18





 07:00 19:00


 


Intake Total 1099 ml 


 


Output Total 400 ml 


 


Balance 699 ml 


 


  


 


Intake IV Total 1099 ml 


 


Output Urine Total 400 ml 








Physical Exam


CONSTITUTIONAL/GENERAL: This is an adequately nourished patient intubated, 

sedated, in no acute distress.


TUBES/LINES/DRAINS: Orotracheal tube; orogastric tube; Haynes catheter; soft 

wrist restraints; left subclavian CVL; peripheral IVs


SKIN: No jaundice, rashes.  There is a large bruise on the inner right thigh.  

Other smaller ecchymoses on the extremities. . No wounds seen anteriorly. Skin 

temperature appropriate. Not diaphoretic. 


HEAD: Atraumatic. Normocephalic.


EYES: Pupils equal and round and reactive, 3 mm.  Unable to assess extraocular 

movements.  No scleral icterus. No injection or drainage. Fundi not examined.


ENT: Unable to assess hearing.. Nose without bleeding or purulent drainage.


CARDIOVASCULAR: Mildly tachycardic, irregular rhythm without murmurs, gallops, 

or rubs. No JVD. Peripheral pulses symmetric.


RESPIRATORY/CHEST: Lungs diminished, coarse breath sounds, rare wheeze.  On 

mechanical vent.


GASTROINTESTINAL: Abdomen soft, distended. No hepato-splenomegaly, or palpable 

masses. No guarding. Bowel sounds hypoactive.


GENITOURINARY: Without palpable bladder distension. Haynes catheter in place.


MUSCULOSKELETAL: Extremities without clubbing, cyanosis, or edema.  Slight 

mottling of the feet is noted.


NEUROLOGICAL: Unresponsive.  No spontaneous movements noted.  Withdraws to 

noxious stimuli on sedation vacation.


PSYCHIATRIC: Unable to assess due to level of responsiveness.


.





Diagnostic Tests


Laboratory





Laboratory Tests








Test


  3/26/18


08:55 3/26/18


09:10 3/26/18


09:15 3/26/18


09:20


 


White Blood Count


  23.1 TH/MM3


(4.0-11.0) 


  


  


 


 


Red Blood Count


  4.61 MIL/MM3


(4.50-5.90) 


  


  


 


 


Hemoglobin


  10.8 GM/DL


(13.0-17.0) 


  


  


 


 


Hematocrit


  35.7 %


(39.0-51.0) 


  


  


 


 


Mean Corpuscular Volume


  77.5 FL


(80.0-100.0) 


  


  


 


 


Mean Corpuscular Hemoglobin


  23.4 PG


(27.0-34.0) 


  


  


 


 


Mean Corpuscular Hemoglobin


Concent 30.2 %


(32.0-36.0) 


  


  


 


 


Red Cell Distribution Width


  24.2 %


(11.6-17.2) 


  


  


 


 


Platelet Count


  232 TH/MM3


(150-450) 


  


  


 


 


Mean Platelet Volume


  9.3 FL


(7.0-11.0) 


  


  


 


 


Neutrophils (%) (Auto)


  83.8 %


(16.0-70.0) 


  


  


 


 


Lymphocytes (%) (Auto)


  13.0 %


(9.0-44.0) 


  


  


 


 


Monocytes (%) (Auto)


  2.9 %


(0.0-8.0) 


  


  


 


 


Eosinophils (%) (Auto)


  0.2 %


(0.0-4.0) 


  


  


 


 


Basophils (%) (Auto)


  0.1 %


(0.0-2.0) 


  


  


 


 


Neutrophils # (Auto)


  19.4 TH/MM3


(1.8-7.7) 


  


  


 


 


Lymphocytes # (Auto)


  3.0 TH/MM3


(1.0-4.8) 


  


  


 


 


Monocytes # (Auto)


  0.7 TH/MM3


(0-0.9) 


  


  


 


 


Eosinophils # (Auto)


  0.1 TH/MM3


(0-0.4) 


  


  


 


 


Basophils # (Auto)


  0.0 TH/MM3


(0-0.2) 


  


  


 


 


CBC Comment AUTO DIFF    


 


Differential Total Cells


Counted 100 


  


  


  


 


 


Neutrophils % (Manual) 73 % (16-70)    


 


Band Neutrophils % 10 % (0-6)    


 


Lymphocytes % 10 % (9-44)    


 


Monocytes % 5 % (0-8)    


 


Eosinophils % 1 % (0-4)    


 


Neutrophils # (Manual)


  19.4 TH/MM3


(1.8-7.7) 


  


  


 


 


Myelocytes 1 % (0-0)    


 


Differential Comment


  FINAL DIFF


MANUAL 


  


  


 


 


Platelet Estimate


  NORMAL


(NORMAL) 


  


  


 


 


Platelet Morphology Comment


  NORMAL


(NORMAL) 


  


  


 


 


Tear Drop Cells 1+ (NORMAL)    


 


Ovalocytes 1+ (NORMAL)    


 


Blood Urea Nitrogen


  27 MG/DL


(7-18) 


  


  


 


 


Creatinine


  1.18 MG/DL


(0.60-1.30) 


  


  


 


 


Random Glucose


  182 MG/DL


() 


  


  


 


 


Total Protein


  6.1 GM/DL


(6.4-8.2) 


  


  


 


 


Albumin


  3.0 GM/DL


(3.4-5.0) 


  


  


 


 


Calcium Level


  7.6 MG/DL


(8.5-10.1) 


  


  


 


 


Alkaline Phosphatase


  75 U/L


() 


  


  


 


 


Aspartate Amino Transf


(AST/SGOT) 60 U/L (15-37) 


  


  


  


 


 


Alanine Aminotransferase


(ALT/SGPT) 44 U/L (12-78) 


  


  


  


 


 


Total Bilirubin


  0.6 MG/DL


(0.2-1.0) 


  


  


 


 


Sodium Level


  141 MEQ/L


(136-145) 


  


  


 


 


Potassium Level


  5.3 MEQ/L


(3.5-5.1) 


  


  


 


 


Chloride Level


  108 MEQ/L


() 


  


  


 


 


Carbon Dioxide Level


  23.0 MEQ/L


(21.0-32.0) 


  


  


 


 


Anion Gap


  10 MEQ/L


(5-15) 


  


  


 


 


Estimat Glomerular Filtration


Rate 61 ML/MIN


(>89) 


  


  


 


 


Troponin I


  0.05 NG/ML


(0.02-0.05) 


  


  


 


 


B-Type Natriuretic Peptide


  534 PG/ML


(0-100) 


  


  


 


 


Lactic Acid Level


  


  3.3 mmol/L


(0.4-2.0) 


  


 


 


Blood Gas Puncture Site   LT BRACHIAL  


 


Blood Gas Patient Temperature   98.6  


 


Blood Gas HCO3


  


  


  22 mmol/L


(22-26) 


 


 


Blood Gas Base Excess


  


  


  -4.3 mmol/L


(-2-2) 


 


 


Blood Gas Oxygen Saturation   97 % ()  


 


Arterial Blood pH


  


  


  7.25


(7.380-7.420) 


 


 


Arterial Blood Partial


Pressure CO2 


  


  52 mmHg


(38-42) 


 


 


Arterial Blood Partial


Pressure O2 


  


  230 mmHg


() 


 


 


Arterial Blood Oxygen Content


  


  


  16.4 Vol %


(12.0-20.0) 


 


 


Arterial Blood


Carboxyhemoglobin 


  


  1.3 % (0-4) 


  


 


 


Arterial Blood Methemoglobin   1.4 % (0-2)  


 


Blood Gas Hemoglobin


  


  


  11.7 G/DL


(12.0-16.0) 


 


 


Oxygen Delivery Device   VENTILATOR  


 


Blood Gas Ventilator Setting


  


  


  PRVC//R16/P5


  


 


 


Blood Gas Inspired Oxygen   100 %  


 


Urine Color


  


  


  


  YELLOW


(YELLW/STRAW)


 


Urine Turbidity    HAZY (CLEAR) 


 


Urine pH    6.5 (5.0-8.5) 


 


Urine Specific Gravity


  


  


  


  1.016


(1.002-1.035)


 


Urine Protein


  


  


  


  100 mg/dL


(NEG-TRACE)


 


Urine Glucose (UA)


  


  


  


  NEG mg/dL


(NEG)


 


Urine Ketones


  


  


  


  NEG mg/dL


(NEG)


 


Urine Occult Blood    TRACE (NEG) 


 


Urine Nitrite    NEG (NEG) 


 


Urine Bilirubin    NEG (NEG) 


 


Urine Urobilinogen


  


  


  


  LESS THAN 2.0


MG/DL (LESS


 


Urine Leukocyte Esterase    NEG (NEG) 


 


Urine RBC    2 /hpf (0-3) 


 


Urine WBC    15 /hpf (0-5) 


 


Urine Mucus    FEW /lpf (OCC) 


 


Microscopic Urinalysis Comment


  


  


  


  CATH-CULTURE


IND


 


Test


  3/26/18


11:32 3/26/18


12:48 3/26/18


16:05 3/26/18


18:40


 


Lactic Acid Level


  4.7 mmol/L


(0.4-2.0) 


  


  


 


 


White Blood Count


  


  29.8 TH/MM3


(4.0-11.0) 


  


 


 


Red Blood Count


  


  5.13 MIL/MM3


(4.50-5.90) 


  


 


 


Hemoglobin


  


  11.9 GM/DL


(13.0-17.0) 


  


 


 


Hematocrit


  


  40.3 %


(39.0-51.0) 


  


 


 


Mean Corpuscular Volume


  


  78.7 FL


(80.0-100.0) 


  


 


 


Mean Corpuscular Hemoglobin


  


  23.2 PG


(27.0-34.0) 


  


 


 


Mean Corpuscular Hemoglobin


Concent 


  29.4 %


(32.0-36.0) 


  


 


 


Red Cell Distribution Width


  


  24.2 %


(11.6-17.2) 


  


 


 


Platelet Count


  


  218 TH/MM3


(150-450) 


  


 


 


Mean Platelet Volume


  


  8.9 FL


(7.0-11.0) 


  


 


 


Neutrophils (%) (Auto)


  


  93.6 %


(16.0-70.0) 


  


 


 


Lymphocytes (%) (Auto)


  


  4.6 %


(9.0-44.0) 


  


 


 


Monocytes (%) (Auto)


  


  1.2 %


(0.0-8.0) 


  


 


 


Eosinophils (%) (Auto)


  


  0.1 %


(0.0-4.0) 


  


 


 


Basophils (%) (Auto)


  


  0.5 %


(0.0-2.0) 


  


 


 


Neutrophils # (Auto)


  


  27.9 TH/MM3


(1.8-7.7) 


  


 


 


Lymphocytes # (Auto)


  


  1.4 TH/MM3


(1.0-4.8) 


  


 


 


Monocytes # (Auto)


  


  0.4 TH/MM3


(0-0.9) 


  


 


 


Eosinophils # (Auto)


  


  0.0 TH/MM3


(0-0.4) 


  


 


 


Basophils # (Auto)


  


  0.1 TH/MM3


(0-0.2) 


  


 


 


CBC Comment  AUTO DIFF   


 


Differential Total Cells


Counted 


  100 


  


  


 


 


Neutrophils % (Manual)  78 % (16-70)   


 


Band Neutrophils %  15 % (0-6)   


 


Lymphocytes %  6 % (9-44)   


 


Monocytes %  1 % (0-8)   


 


Neutrophils # (Manual)


  


  27.7 TH/MM3


(1.8-7.7) 


  


 


 


Nucleated Red Blood Cells


  


  1 /100 WBC


(0-0) 


  


 


 


Differential Comment


  


  FINAL DIFF


MANUAL 


  


 


 


Platelet Estimate


  


  NORMAL


(NORMAL) 


  


 


 


Platelet Morphology Comment


  


  NORMAL


(NORMAL) 


  


 


 


Polychromasia


  


  2.2 %


(0.0-1.9) 


  


 


 


Ovalocytes  1+ (NORMAL)   


 


Blood Urea Nitrogen


  


  25 MG/DL


(7-18) 28 MG/DL


(7-18) 


 


 


Creatinine


  


  1.19 MG/DL


(0.60-1.30) 1.46 MG/DL


(0.60-1.30) 


 


 


Random Glucose


  


  14 MG/DL


() 31 MG/DL


() 


 


 


Total Protein


  


  5.2 GM/DL


(6.4-8.2) 5.0 GM/DL


(6.4-8.2) 


 


 


Albumin


  


  2.4 GM/DL


(3.4-5.0) 2.3 GM/DL


(3.4-5.0) 


 


 


Calcium Level


  


  7.6 MG/DL


(8.5-10.1) 7.7 MG/DL


(8.5-10.1) 


 


 


Alkaline Phosphatase


  


  104 U/L


() 106 U/L


() 


 


 


Aspartate Amino Transf


(AST/SGOT) 


  1121 U/L


(15-37) 1631 U/L


(15-37) 


 


 


Alanine Aminotransferase


(ALT/SGPT) 


  1141 U/L


(12-78) 1940 U/L


(12-78) 


 


 


Total Bilirubin


  


  1.4 MG/DL


(0.2-1.0) 1.3 MG/DL


(0.2-1.0) 


 


 


Sodium Level


  


  147 MEQ/L


(136-145) 146 MEQ/L


(136-145) 


 


 


Potassium Level


  


  5.5 MEQ/L


(3.5-5.1) 4.8 MEQ/L


(3.5-5.1) 


 


 


Chloride Level


  


  116 MEQ/L


() 116 MEQ/L


() 


 


 


Carbon Dioxide Level


  


  21.5 MEQ/L


(21.0-32.0) 20.9 MEQ/L


(21.0-32.0) 


 


 


Anion Gap


  


  10 MEQ/L


(5-15) 9 MEQ/L (5-15) 


  


 


 


Estimat Glomerular Filtration


Rate 


  61 ML/MIN


(>89) 48 ML/MIN


(>89) 


 


 


Prothrombin Time


  


  


  12.4 SEC


(9.8-11.6) 


 


 


Prothromb Time International


Ratio 


  


  1.2 RATIO 


  


 


 


Hepatitis A IgM Antibody


  


  


  NONREACTIVE


(NONREACTIVE) 


 


 


Hepatitis B Surface Antigen


  


  


  NONREACTIVE


(NONREACTIVE) 


 


 


Hepatitis B Core IgM Antibody


  


  


  NONREACTIVE


(NONREACTIVE) 


 


 


Hepatitis C IgG Antibody


  


  


  NONREACTIVE


(NONREACTIVE) 


 


 


Blood Gas Puncture Site    RT FEMORAL 


 


Blood Gas Patient Temperature    37.0 


 


Blood Gas HCO3


  


  


  


  19 mmol/L


(22-26)


 


Blood Gas Base Excess


  


  


  


  -9.1 mmol/L


(-2-2)


 


Blood Gas Oxygen Saturation    90 % () 


 


Arterial Blood pH


  


  


  


  7.09


(7.380-7.420)


 


Arterial Blood Partial


Pressure CO2 


  


  


  66 mmHg


(38-42)


 


Arterial Blood Partial


Pressure O2 


  


  


  84 mmHg


()


 


Arterial Blood Oxygen Content


  


  


  


  15.1 Vol %


(12.0-20.0)


 


Arterial Blood


Carboxyhemoglobin 


  


  


  1.1 % (0-4) 


 


 


Arterial Blood Methemoglobin    1.5 % (0-2) 


 


Blood Gas Hemoglobin


  


  


  


  11.8 G/DL


(12.0-16.0)


 


Oxygen Delivery Device


  


  


  


  VENT PRVC/AC


MODE


 


Blood Gas Ventilator Setting


  


  


  


  RR20//PEEP8


 


 


Blood Gas Inspired Oxygen    100 % 


 


Test


  3/26/18


21:50 3/26/18


23:05 3/26/18


23:15 3/27/18


00:01


 


Lactic Acid Level


  2.3 mmol/L


(0.4-2.0) 


  


  2.3 mmol/L


(0.4-2.0)


 


Nasal Screen MRSA (PCR)


  


  MRSA DETECTED


(NOT DETECT) 


  


 


 


Blood Urea Nitrogen


  


  34 MG/DL


(7-18) 


  


 


 


Creatinine


  


  1.80 MG/DL


(0.60-1.30) 


  


 


 


Random Glucose


  


  111 MG/DL


() 


  


 


 


Total Protein


  


  4.8 GM/DL


(6.4-8.2) 


  


 


 


Calcium Level


  


  7.1 MG/DL


(8.5-10.1) 


  


 


 


Sodium Level


  


  144 MEQ/L


(136-145) 


  


 


 


Potassium Level


  


  4.8 MEQ/L


(3.5-5.1) 


  


 


 


Chloride Level


  


  114 MEQ/L


() 


  


 


 


Carbon Dioxide Level


  


  21.8 MEQ/L


(21.0-32.0) 


  


 


 


Anion Gap  8 MEQ/L (5-15)   


 


Estimat Glomerular Filtration


Rate 


  38 ML/MIN


(>89) 


  


 


 


Protein Corrected Calcium


  


  8.4 MG/DL


(8.5-10.1) 


  


 


 


Blood Gas Puncture Site   KAI  


 


Blood Gas Patient Temperature   37.0  


 


Blood Gas HCO3


  


  


  18 mmol/L


(22-26) 


 


 


Blood Gas Base Excess


  


  


  -8.6 mmol/L


(-2-2) 


 


 


Blood Gas Oxygen Saturation   95 % ()  


 


Arterial Blood pH


  


  


  7.24


(7.380-7.420) 


 


 


Arterial Blood Partial


Pressure CO2 


  


  43 mmHg


(38-42) 


 


 


Arterial Blood Partial


Pressure O2 


  


  107 mmHg


() 


 


 


Arterial Blood Oxygen Content


  


  


  14.8 Vol %


(12.0-20.0) 


 


 


Arterial Blood


Carboxyhemoglobin 


  


  0.3 % (0-4) 


  


 


 


Arterial Blood Methemoglobin   1.1 % (0-2)  


 


Blood Gas Hemoglobin


  


  


  11.0 G/DL


(12.0-16.0) 


 


 


Oxygen Delivery Device   VENTILATOR  


 


Blood Gas Ventilator Setting   [facility]/AC  


 


Blood Gas Inspired Oxygen   90 %  


 


Test


  3/27/18


04:35 3/27/18


07:30 3/27/18


08:20 3/28/18


04:27


 


White Blood Count


  20.3 TH/MM3


(4.0-11.0) 


  


  18.0 TH/MM3


(4.0-11.0)


 


Red Blood Count


  4.46 MIL/MM3


(4.50-5.90) 


  


  3.73 MIL/MM3


(4.50-5.90)


 


Hemoglobin


  10.4 GM/DL


(13.0-17.0) 


  


  9.1 GM/DL


(13.0-17.0)


 


Hematocrit


  34.4 %


(39.0-51.0) 


  


  28.0 %


(39.0-51.0)


 


Mean Corpuscular Volume


  77.2 FL


(80.0-100.0) 


  


  75.2 FL


(80.0-100.0)


 


Mean Corpuscular Hemoglobin


  23.2 PG


(27.0-34.0) 


  


  24.5 PG


(27.0-34.0)


 


Mean Corpuscular Hemoglobin


Concent 30.1 %


(32.0-36.0) 


  


  32.6 %


(32.0-36.0)


 


Red Cell Distribution Width


  22.9 %


(11.6-17.2) 


  


  23.0 %


(11.6-17.2)


 


Platelet Count


  160 TH/MM3


(150-450) 


  


  112 TH/MM3


(150-450)


 


Mean Platelet Volume


  9.9 FL


(7.0-11.0) 


  


  9.0 FL


(7.0-11.0)


 


Neutrophils (%) (Auto)


  94.8 %


(16.0-70.0) 


  


  


 


 


Lymphocytes (%) (Auto)


  3.7 %


(9.0-44.0) 


  


  


 


 


Monocytes (%) (Auto)


  1.3 %


(0.0-8.0) 


  


  


 


 


Eosinophils (%) (Auto)


  0.1 %


(0.0-4.0) 


  


  


 


 


Basophils (%) (Auto)


  0.1 %


(0.0-2.0) 


  


  


 


 


Neutrophils # (Auto)


  19.2 TH/MM3


(1.8-7.7) 


  


  


 


 


Lymphocytes # (Auto)


  0.8 TH/MM3


(1.0-4.8) 


  


  


 


 


Monocytes # (Auto)


  0.3 TH/MM3


(0-0.9) 


  


  


 


 


Eosinophils # (Auto)


  0.0 TH/MM3


(0-0.4) 


  


  


 


 


Basophils # (Auto)


  0.0 TH/MM3


(0-0.2) 


  


  


 


 


CBC Comment AUTO DIFF    


 


Differential Total Cells


Counted 100 


  


  


  


 


 


Neutrophils % (Manual) 55 % (16-70)    


 


Band Neutrophils % 30 % (0-6)    


 


Lymphocytes % 9 % (9-44)    


 


Monocytes % 2 % (0-8)    


 


Neutrophils # (Manual)


  18.1 TH/MM3


(1.8-7.7) 


  


  


 


 


Metamyelocytes 4 % (0-1)    


 


Nucleated Red Blood Cells


  2 /100 WBC


(0-0) 


  


  


 


 


Differential Comment


  FINAL DIFF


MANUAL 


  


  


 


 


Platelet Estimate


  NORMAL


(NORMAL) 


  


  


 


 


Platelet Morphology Comment


  CLUMPED


(NORMAL) 


  


  


 


 


Ovalocytes 1+ (NORMAL)    


 


Blood Urea Nitrogen


  37 MG/DL


(7-18) 


  


  50 MG/DL


(7-18)


 


Creatinine


  2.30 MG/DL


(0.60-1.30) 


  


  3.10 MG/DL


(0.60-1.30)


 


Random Glucose


  116 MG/DL


() 


  


  146 MG/DL


()


 


Total Protein


  4.5 GM/DL


(6.4-8.2) 


  


  4.4 GM/DL


(6.4-8.2)


 


Albumin


  2.1 GM/DL


(3.4-5.0) 


  


  1.7 GM/DL


(3.4-5.0)


 


Calcium Level


  7.1 MG/DL


(8.5-10.1) 


  


  6.9 MG/DL


(8.5-10.1)


 


Alkaline Phosphatase


  92 U/L


() 


  


  77 U/L


()


 


Aspartate Amino Transf


(AST/SGOT) 1496 U/L


(15-37) 


  


  847 U/L


(15-37)


 


Alanine Aminotransferase


(ALT/SGPT) 2117 U/L


(12-78) 


  


  2260 U/L


(12-78)


 


Total Bilirubin


  1.1 MG/DL


(0.2-1.0) 


  


  1.2 MG/DL


(0.2-1.0)


 


Sodium Level


  144 MEQ/L


(136-145) 


  


  139 MEQ/L


(136-145)


 


Potassium Level


  5.0 MEQ/L


(3.5-5.1) 


  


  4.6 MEQ/L


(3.5-5.1)


 


Chloride Level


  115 MEQ/L


() 


  


  110 MEQ/L


()


 


Carbon Dioxide Level


  21.5 MEQ/L


(21.0-32.0) 


  


  17.2 MEQ/L


(21.0-32.0)


 


Anion Gap


  8 MEQ/L (5-15) 


  


  


  12 MEQ/L


(5-15)


 


Estimat Glomerular Filtration


Rate 28 ML/MIN


(>89) 


  


  20 ML/MIN


(>89)


 


Protein Corrected Calcium


  8.5 MG/DL


(8.5-10.1) 


  


  8.4 MG/DL


(8.5-10.1)


 


Blood Gas Puncture Site  ART LINE   


 


Blood Gas Patient Temperature  37.0   


 


Blood Gas HCO3


  


  16 mmol/L


(22-26) 


  


 


 


Blood Gas Base Excess


  


  -9.4 mmol/L


(-2-2) 


  


 


 


Blood Gas Oxygen Saturation  96 % ()   


 


Arterial Blood pH


  


  7.27


(7.380-7.420) 


  


 


 


Arterial Blood Partial


Pressure CO2 


  37 mmHg


(38-42) 


  


 


 


Arterial Blood Partial


Pressure O2 


  156 mmHg


() 


  


 


 


Arterial Blood Oxygen Content


  


  12.6 Vol %


(12.0-20.0) 


  


 


 


Arterial Blood


Carboxyhemoglobin 


  1.2 % (0-4) 


  


  


 


 


Arterial Blood Methemoglobin  1.5 % (0-2)   


 


Blood Gas Hemoglobin


  


  9.1 G/DL


(12.0-16.0) 


  


 


 


Oxygen Delivery Device


  


  VENT PRVC/AC


MODE 


  


 


 


Blood Gas Ventilator Setting


  


  /RR20/PEEP


8 


  


 


 


Blood Gas Inspired Oxygen  70 %   


 


Prothrombin Time


  


  


  14.4 SEC


(9.8-11.6) 12.7 SEC


(9.8-11.6)


 


Prothromb Time International


Ratio 


  


  1.4 RATIO 


  1.3 RATIO 


 


 


Activated Partial


Thromboplast Time 


  


  33.4 SEC


(24.3-30.1) 40.1 SEC


(24.3-30.1)


 


Test


  3/28/18


06:28 3/28/18


08:03 


  


 


 


Blood Gas Puncture Site KAI    


 


Blood Gas Patient Temperature 37.0    


 


Blood Gas HCO3


  16 mmol/L


(22-26) 


  


  


 


 


Blood Gas Base Excess


  -9.2 mmol/L


(-2-2) 


  


  


 


 


Blood Gas Oxygen Saturation 95 % ()    


 


Arterial Blood pH


  7.29


(7.380-7.420) 


  


  


 


 


Arterial Blood Partial


Pressure CO2 35 mmHg


(38-42) 


  


  


 


 


Arterial Blood Partial


Pressure O2 113 mmHg


() 


  


  


 


 


Arterial Blood Oxygen Content


  12.0 Vol %


(12.0-20.0) 


  


  


 


 


Arterial Blood


Carboxyhemoglobin 1.3 % (0-4) 


  


  


  


 


 


Arterial Blood Methemoglobin 1.6 % (0-2)    


 


Blood Gas Hemoglobin


  8.8 G/DL


(12.0-16.0) 


  


  


 


 


Oxygen Delivery Device VENTILATOR    


 


Blood Gas Ventilator Setting PRVC/AV    


 


Blood Gas Inspired Oxygen 40 %    


 


Prealbumin


  


  13 MG/DL


(20-40) 


  


 








Result Diagram:  


3/28/18 0427                                                                   

             3/28/18 0427





Microbiology





Microbiology








 Date/Time


Source Procedure


Growth Status


 


 


 3/26/18 08:55


Blood Peripheral Aerobic Blood Culture - Preliminary


Gram Positive Cocci Resulted


 


 3/26/18 08:55


Blood Peripheral Anaerobic Blood Culture - Preliminary


NO GROWTH IN 2 DAYS Resulted





 3/26/18 08:40


Blood Peripheral Aerobic Blood Culture - Preliminary


NO GROWTH IN 2 DAYS Resulted


 


 3/26/18 08:40 Anaerobic Blood Culture - Final


Staph Sp Coagulase Negative Resulted





 3/26/18 23:10


Sputum Endotracheal Gram Stain - Final Complete


 


 3/26/18 23:10


Sputum Endotracheal Sputum Culture - Final


NO GROWTH IN 48 HOURS. Complete


 


 3/26/18 12:48


Nasal Aspirate Influenza Types A,B Antigen (STEPHANIE) - Final


NEGATIVE FOR FLU A AND B ANTIGEN.... Complete





 3/27/18 09:30


Urine Catheterized Urine Legionella Antigen - Final


PRESUMPTIVE NEGATIVE FOR LEGIONELLA P... Complete


 


 3/27/18 09:30


Urine Catheterized Urine Streptococcus pneumoniae Antigen (M - Final


PRESUMPTIVE NEGATIVE FOR STREPTOCOCCU... Complete


 


 3/26/18 09:20


Urine Catheterized Urine Urine Culture - Final


NO GROWTH IN 48 HOURS. Complete








Imaging





Last Impressions








Renal Ultrasound 3/28/18 0000 Signed





Impressions: 





 Service Date/Time:  2018 08:50 - CONCLUSION:  1. No 





 evidence of hydronephrosis. 2. Increased echogenicity of the renal parenchyma 





 bilaterally suggestive of chronic medical renal disease.     Hermelindo Rodriguez MD 


 


Head CT 3/26/18 0919 Signed





Impressions: 





 Service Date/Time:  2018 13:25 - CONCLUSION: Negative 





 noncontrast CT.     Rob Marshall MD 


 


Chest X-Ray 3/26/18 0840 Signed





Impressions: 





 Service Date/Time:  2018 08:53 - CONCLUSION:  1. Interval 





 intubation and placement of nasogastric tube. 2. New bilateral airspace 

disease 





 most characteristic of pulmonary edema. 3. Small left effusion.     Rob Marshall MD 


 


Liver Ultrasound 3/26/18 0000 Signed





Impressions: 





 Service Date/Time:  2018 16:24 - CONCLUSION:  1. Minimal 

free 





 fluid in the abdomen 2. Mildly distended gallbladder without evidence of 





 cholelithiasis. 3. No evidence of biliary obstructive disease or focal liver 





 abnormality.     Lencho Montes MD 


 


Chest CT 3/26/18 0000 Signed





Impressions: 





 Service Date/Time:  2018 13:28 - CONCLUSION:  1. Bilateral 





 airspace disease with areas of consolidation and air bronchograms most 





 characteristic of pulmonary edema. 2. Minimal pleural effusions. 3. The 

patient 





 is intubated with the endotracheal tube tip at the level of the savana.      





 Rob Marshall MD 








Procedures


* 3/26 intubation/mechanical ventilation.  Intubation by EMS at nursing home.


* 3/26 right radial arterial line placement


* 3/26 left subclavian triple-lumen catheter placement


.





Assessment and Plan


Disease Oriented Problem List:  


(1) Cardiac arrest


(2) Respiratory failure


(3) Shock liver


(4) COPD with exacerbation


(5) Severe sepsis


(6) Acute kidney injury


(7) Atrial fibrillation with RVR


(8) Pulmonary edema


(9) CHF (congestive heart failure)


(10) Pneumonia


(11) Hypoalbuminemia


Symptom Scale:  


(1) Pain


0-10 Scale:  Unable to quantify


Comment:  Unclear if patient had prehospitalization pain syndromes.  Current 

sources of pain might include recent chest compressions; orotracheal and 

orogastric intubations; Haynes catheter; venous access catheters; restraints; 

prolonged bedbound status.


.





(2) Dyspnea


0-10 Scale:  Unable to quantify


Comment:  Dyspnea probably secondary to a combination of underlying COPD; 

tachycardia; and congestive heart failure.  Dyspnea currently managed on the 

ventilator.


.





(3) Encephalopathy


0-10 Scale:  Unable to quantify





Pertinent Non-Medical Issues


Psychosocial: He was born in [facility] and moved to [facility] as a young child where he spent most of his life.  He was in the [employer] and 

served in the [place] War.  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  He has 1 daughter, Edilia Jeffers who lives in 

[place] and one son, from whom he is estranged who changed his name to Reid Ryan.  He was 1 of 5 children.  2 sisters are  and he has 1 sister, 

Sue who is his healthcare surrogate and one brother in [place].  Patient was 

a resident of [facility].  Sister has been located and produced 

advanced directive paperwork naming her as healthcare surrogate.





Spiritual: Not an important concept to the patient per the sister.





Legal: Healthcare surrogate and living will on chart.





Ethical issues impacting care: Patient is incapacitated.  It appears unlikely 

at this time that he will regain capacity to make his own healthcare decisions.

  His sister is willing to serve as his healthcare surrogate.


.


Important Contacts


Daily Baltazar (daughter) 





Katelynn Womack (sister) [phone] healthcare surrogate


.


Prognosis


Patient has multiple underlying comorbidities.  He came in in respiratory 

distress and suffered a asystolic cardiac arrest.  He is now going into shock 

liver.  Kidney function is declining.  Prognosis is poor at this time.  It is 

uncertain if patient will survive this hospitalization.  If he does survive the 

hospitalization it is unclear what type of quality of life he will return to.  

Prognostication would also be helped if I was clear about his functional status 

prior to this event.


.


Code Status:  Full Code


Plan


==  Code Status: FULL CODE  --sister wishes him to remain a full code until she 

has discussed this with the patient's daughter.





== Decision Making: Patient is incapacitated to make his own healthcare 

decisions.  At this point it is very unlikely that he will recover capacity to 

do so.  His sister Katelynn is his healthcare surrogate





== Goals medical treatment: Sister states that patient stated he would undergo 

1 round of CPR but then would not want it extended and would want to be allowed 

to pass naturally.  The CODE STATUS has not been changed pending discussion 

with the patient's daughter.





== Symptoms:


* Pain: Unclear if patient had prehospitalization pain syndromes.  Current 

sources of pain might include recent chest compressions; orotracheal and 

orogastric intubations; Haynes catheter; venous access catheters; restraints; 

prolonged bedbound status.  There are no current orders for opioids.  Sedating 

medicines are probably being minimized at this time with the hopes of improving 

respiratory status.  No recommendations at this time.


* Dyspnea:   probably secondary to a combination of underlying COPD; tachycardia

; and congestive heart failure.  Dyspnea currently managed on the ventilator.  

No additional recommendations at this time.


* Encephalopathy:  Probably metabolic and multi-factorial .  This will probably 

improve if we can correct some of the underlying metabolic issues.  No further 

recommendations at this time.


* Anxiety: Per nursing home and the patient's sister, he has a severe anxiety 

disorder and has been chronically on benzodiazepines.  He is a heavy alcohol 

user of 2-3 6 packs per day, but has not been drinking during the last 2 weeks 

as he has been in a nursing home.  He is receiving Valium 10 mg every 8 hours 

and no further tremors are seen at this evaluation.











== Palliative care will continue to follow to assist with symptom management 

and to further clarify goals of medical treatment as the clinical course 

evolves.


.





Attestation


To help prompt me to consider important information that might be impacting 

today's encounter and assessment, information from prior notes written by 

myself or my colleagues may have been "brought forward" into today's note.  My 

signature on this note, however, is an attestation that I personally performed 

the exam, history, and/or decision-making noted today, and, unless otherwise 

indicated, the interactions with patient, family, and staff as well as the 

review of records all occurred today.  I also attest that the listed assessment 

and stated plan reflect my best clinical judgment today based on the 

combination of historical information, prior notes, and today's exam/ 

interactions.  When time spent is documented, it refers only to time spent 

today by the signer, or if indicated, combined time spent today by 

collaborating physician/nurse practitioner.


.











Sarah Farmer Mar 28, 2018 15:43

## 2018-03-29 VITALS
HEART RATE: 98 BPM | DIASTOLIC BLOOD PRESSURE: 76 MMHG | TEMPERATURE: 97.6 F | RESPIRATION RATE: 20 BRPM | OXYGEN SATURATION: 99 % | SYSTOLIC BLOOD PRESSURE: 112 MMHG

## 2018-03-29 VITALS — OXYGEN SATURATION: 99 %

## 2018-03-29 VITALS
OXYGEN SATURATION: 96 % | HEART RATE: 94 BPM | DIASTOLIC BLOOD PRESSURE: 78 MMHG | RESPIRATION RATE: 21 BRPM | SYSTOLIC BLOOD PRESSURE: 128 MMHG

## 2018-03-29 VITALS
SYSTOLIC BLOOD PRESSURE: 119 MMHG | DIASTOLIC BLOOD PRESSURE: 78 MMHG | HEART RATE: 148 BPM | OXYGEN SATURATION: 97 % | RESPIRATION RATE: 20 BRPM

## 2018-03-29 VITALS
OXYGEN SATURATION: 98 % | DIASTOLIC BLOOD PRESSURE: 76 MMHG | HEART RATE: 94 BPM | SYSTOLIC BLOOD PRESSURE: 124 MMHG | TEMPERATURE: 97.6 F | RESPIRATION RATE: 20 BRPM

## 2018-03-29 VITALS
SYSTOLIC BLOOD PRESSURE: 119 MMHG | RESPIRATION RATE: 19 BRPM | DIASTOLIC BLOOD PRESSURE: 83 MMHG | OXYGEN SATURATION: 100 % | HEART RATE: 92 BPM

## 2018-03-29 VITALS
HEART RATE: 84 BPM | RESPIRATION RATE: 16 BRPM | OXYGEN SATURATION: 100 % | SYSTOLIC BLOOD PRESSURE: 122 MMHG | DIASTOLIC BLOOD PRESSURE: 77 MMHG

## 2018-03-29 VITALS
OXYGEN SATURATION: 93 % | DIASTOLIC BLOOD PRESSURE: 85 MMHG | SYSTOLIC BLOOD PRESSURE: 114 MMHG | RESPIRATION RATE: 20 BRPM | HEART RATE: 152 BPM

## 2018-03-29 VITALS
HEART RATE: 118 BPM | RESPIRATION RATE: 20 BRPM | OXYGEN SATURATION: 95 % | DIASTOLIC BLOOD PRESSURE: 72 MMHG | SYSTOLIC BLOOD PRESSURE: 132 MMHG

## 2018-03-29 VITALS — HEART RATE: 152 BPM

## 2018-03-29 VITALS — OXYGEN SATURATION: 97 %

## 2018-03-29 VITALS
HEART RATE: 114 BPM | SYSTOLIC BLOOD PRESSURE: 126 MMHG | RESPIRATION RATE: 20 BRPM | DIASTOLIC BLOOD PRESSURE: 70 MMHG | OXYGEN SATURATION: 97 %

## 2018-03-29 VITALS
DIASTOLIC BLOOD PRESSURE: 80 MMHG | RESPIRATION RATE: 20 BRPM | SYSTOLIC BLOOD PRESSURE: 132 MMHG | HEART RATE: 94 BPM | OXYGEN SATURATION: 98 %

## 2018-03-29 VITALS
DIASTOLIC BLOOD PRESSURE: 99 MMHG | OXYGEN SATURATION: 96 % | SYSTOLIC BLOOD PRESSURE: 133 MMHG | HEART RATE: 154 BPM | RESPIRATION RATE: 20 BRPM

## 2018-03-29 VITALS
OXYGEN SATURATION: 98 % | RESPIRATION RATE: 20 BRPM | HEART RATE: 94 BPM | SYSTOLIC BLOOD PRESSURE: 127 MMHG | DIASTOLIC BLOOD PRESSURE: 76 MMHG

## 2018-03-29 VITALS — OXYGEN SATURATION: 95 %

## 2018-03-29 VITALS
SYSTOLIC BLOOD PRESSURE: 124 MMHG | HEART RATE: 110 BPM | OXYGEN SATURATION: 99 % | DIASTOLIC BLOOD PRESSURE: 65 MMHG | RESPIRATION RATE: 20 BRPM

## 2018-03-29 VITALS
HEART RATE: 124 BPM | OXYGEN SATURATION: 98 % | SYSTOLIC BLOOD PRESSURE: 136 MMHG | RESPIRATION RATE: 20 BRPM | DIASTOLIC BLOOD PRESSURE: 78 MMHG

## 2018-03-29 VITALS
HEART RATE: 104 BPM | RESPIRATION RATE: 20 BRPM | SYSTOLIC BLOOD PRESSURE: 121 MMHG | DIASTOLIC BLOOD PRESSURE: 72 MMHG | TEMPERATURE: 97.9 F | OXYGEN SATURATION: 97 %

## 2018-03-29 VITALS
HEART RATE: 112 BPM | RESPIRATION RATE: 20 BRPM | OXYGEN SATURATION: 95 % | DIASTOLIC BLOOD PRESSURE: 78 MMHG | SYSTOLIC BLOOD PRESSURE: 110 MMHG

## 2018-03-29 VITALS — HEART RATE: 87 BPM

## 2018-03-29 VITALS — HEART RATE: 94 BPM | RESPIRATION RATE: 20 BRPM | OXYGEN SATURATION: 100 %

## 2018-03-29 VITALS
DIASTOLIC BLOOD PRESSURE: 76 MMHG | OXYGEN SATURATION: 100 % | RESPIRATION RATE: 8 BRPM | HEART RATE: 108 BPM | SYSTOLIC BLOOD PRESSURE: 129 MMHG

## 2018-03-29 VITALS
SYSTOLIC BLOOD PRESSURE: 125 MMHG | OXYGEN SATURATION: 100 % | DIASTOLIC BLOOD PRESSURE: 73 MMHG | RESPIRATION RATE: 20 BRPM | HEART RATE: 94 BPM

## 2018-03-29 VITALS — OXYGEN SATURATION: 100 %

## 2018-03-29 VITALS
HEART RATE: 134 BPM | DIASTOLIC BLOOD PRESSURE: 76 MMHG | SYSTOLIC BLOOD PRESSURE: 126 MMHG | OXYGEN SATURATION: 95 % | RESPIRATION RATE: 22 BRPM

## 2018-03-29 VITALS
OXYGEN SATURATION: 99 % | DIASTOLIC BLOOD PRESSURE: 70 MMHG | SYSTOLIC BLOOD PRESSURE: 120 MMHG | HEART RATE: 96 BPM | RESPIRATION RATE: 20 BRPM

## 2018-03-29 VITALS
DIASTOLIC BLOOD PRESSURE: 69 MMHG | TEMPERATURE: 97.3 F | SYSTOLIC BLOOD PRESSURE: 117 MMHG | HEART RATE: 118 BPM | RESPIRATION RATE: 20 BRPM | OXYGEN SATURATION: 97 %

## 2018-03-29 VITALS
SYSTOLIC BLOOD PRESSURE: 113 MMHG | OXYGEN SATURATION: 100 % | DIASTOLIC BLOOD PRESSURE: 77 MMHG | RESPIRATION RATE: 20 BRPM | HEART RATE: 96 BPM

## 2018-03-29 VITALS
OXYGEN SATURATION: 98 % | DIASTOLIC BLOOD PRESSURE: 70 MMHG | SYSTOLIC BLOOD PRESSURE: 122 MMHG | RESPIRATION RATE: 20 BRPM | HEART RATE: 96 BPM

## 2018-03-29 VITALS — HEART RATE: 96 BPM | OXYGEN SATURATION: 100 % | RESPIRATION RATE: 7 BRPM

## 2018-03-29 VITALS
HEART RATE: 98 BPM | RESPIRATION RATE: 19 BRPM | DIASTOLIC BLOOD PRESSURE: 68 MMHG | OXYGEN SATURATION: 92 % | SYSTOLIC BLOOD PRESSURE: 116 MMHG

## 2018-03-29 VITALS
OXYGEN SATURATION: 89 % | SYSTOLIC BLOOD PRESSURE: 124 MMHG | HEART RATE: 156 BPM | DIASTOLIC BLOOD PRESSURE: 73 MMHG | RESPIRATION RATE: 20 BRPM

## 2018-03-29 VITALS
OXYGEN SATURATION: 98 % | HEART RATE: 94 BPM | SYSTOLIC BLOOD PRESSURE: 122 MMHG | RESPIRATION RATE: 19 BRPM | DIASTOLIC BLOOD PRESSURE: 70 MMHG

## 2018-03-29 VITALS
SYSTOLIC BLOOD PRESSURE: 112 MMHG | DIASTOLIC BLOOD PRESSURE: 71 MMHG | OXYGEN SATURATION: 99 % | RESPIRATION RATE: 34 BRPM | HEART RATE: 110 BPM

## 2018-03-29 VITALS — HEART RATE: 140 BPM

## 2018-03-29 VITALS
SYSTOLIC BLOOD PRESSURE: 126 MMHG | RESPIRATION RATE: 20 BRPM | OXYGEN SATURATION: 99 % | DIASTOLIC BLOOD PRESSURE: 80 MMHG | HEART RATE: 96 BPM

## 2018-03-29 VITALS — OXYGEN SATURATION: 95 % | HEART RATE: 118 BPM

## 2018-03-29 VITALS — HEART RATE: 93 BPM | OXYGEN SATURATION: 100 %

## 2018-03-29 VITALS — OXYGEN SATURATION: 98 %

## 2018-03-29 LAB
ALBUMIN SERPL-MCNC: 2.1 GM/DL (ref 3.4–5)
ALP SERPL-CCNC: 70 U/L (ref 45–117)
ALT SERPL-CCNC: 2801 U/L (ref 12–78)
AST SERPL-CCNC: 828 U/L (ref 15–37)
BILIRUB SERPL-MCNC: 1.5 MG/DL (ref 0.2–1)
BUN SERPL-MCNC: 65 MG/DL (ref 7–18)
CALCIUM SERPL-MCNC: 6.8 MG/DL (ref 8.5–10.1)
CALCIUM TP COR SERPL-MCNC: 7.8 MG/DL (ref 8.5–10.1)
CHLORIDE SERPL-SCNC: 106 MEQ/L (ref 98–107)
CREAT SERPL-MCNC: 3.6 MG/DL (ref 0.6–1.3)
ERYTHROCYTE [DISTWIDTH] IN BLOOD BY AUTOMATED COUNT: 22.9 % (ref 11.6–17.2)
GFR SERPLBLD BASED ON 1.73 SQ M-ARVRAT: 17 ML/MIN (ref 89–?)
GLUCOSE SERPL-MCNC: 169 MG/DL (ref 74–106)
HCO3 BLD-SCNC: 15.3 MEQ/L (ref 21–32)
HCT VFR BLD CALC: 27.2 % (ref 39–51)
HGB BLD-MCNC: 8.4 GM/DL (ref 13–17)
INR PPP: 1.1 RATIO
LACTIC ACID SEPSIS PROTOCOL: 5.7 MMOL/L (ref 0.4–2)
MCH RBC QN AUTO: 23.1 PG (ref 27–34)
MCHC RBC AUTO-ENTMCNC: 30.7 % (ref 32–36)
MCV RBC AUTO: 75.2 FL (ref 80–100)
PLATELET # BLD: 85 TH/MM3 (ref 150–450)
PMV BLD AUTO: 10.7 FL (ref 7–11)
PROT SERPL-MCNC: 5.1 GM/DL (ref 6.4–8.2)
PROTHROMBIN TIME: 10.9 SEC (ref 9.8–11.6)
RBC # BLD AUTO: 3.62 MIL/MM3 (ref 4.5–5.9)
SODIUM SERPL-SCNC: 136 MEQ/L (ref 136–145)
WBC # BLD AUTO: 9.6 TH/MM3 (ref 4–11)

## 2018-03-29 RX ADMIN — STANDARDIZED SENNA CONCENTRATE AND DOCUSATE SODIUM SCH TAB: 8.6; 5 TABLET, FILM COATED ORAL at 20:23

## 2018-03-29 RX ADMIN — VANCOMYCIN HYDROCHLORIDE SCH MLS/HR: 1 INJECTION, SOLUTION INTRAVENOUS at 14:00

## 2018-03-29 RX ADMIN — HUMAN INSULIN SCH: 100 INJECTION, SOLUTION SUBCUTANEOUS at 08:46

## 2018-03-29 RX ADMIN — MUPIROCIN CALCIUM SCH APPLIC: 20 OINTMENT TOPICAL at 08:45

## 2018-03-29 RX ADMIN — TAZOBACTAM SODIUM AND PIPERACILLIN SODIUM SCH MLS/HR: 250; 2 INJECTION, SOLUTION INTRAVENOUS at 08:45

## 2018-03-29 RX ADMIN — HEPARIN SODIUM PRN MLS/HR: 10000 INJECTION, SOLUTION INTRAVENOUS at 06:25

## 2018-03-29 RX ADMIN — HEPARIN SODIUM SCH MLS/HR: 10000 INJECTION, SOLUTION INTRAVENOUS; SUBCUTANEOUS at 09:24

## 2018-03-29 RX ADMIN — HUMAN INSULIN SCH: 100 INJECTION, SOLUTION SUBCUTANEOUS at 20:21

## 2018-03-29 RX ADMIN — MUPIROCIN CALCIUM SCH APPLIC: 20 OINTMENT TOPICAL at 20:22

## 2018-03-29 RX ADMIN — TAZOBACTAM SODIUM AND PIPERACILLIN SODIUM SCH MLS/HR: 250; 2 INJECTION, SOLUTION INTRAVENOUS at 20:23

## 2018-03-29 RX ADMIN — TAZOBACTAM SODIUM AND PIPERACILLIN SODIUM SCH MLS/HR: 250; 2 INJECTION, SOLUTION INTRAVENOUS at 15:01

## 2018-03-29 RX ADMIN — STANDARDIZED SENNA CONCENTRATE AND DOCUSATE SODIUM SCH TAB: 8.6; 5 TABLET, FILM COATED ORAL at 08:48

## 2018-03-29 RX ADMIN — METHYLPREDNISOLONE SODIUM SUCCINATE SCH MG: 40 INJECTION, POWDER, FOR SOLUTION INTRAMUSCULAR; INTRAVENOUS at 21:41

## 2018-03-29 RX ADMIN — IPRATROPIUM BROMIDE AND ALBUTEROL SULFATE SCH AMPULE: .5; 3 SOLUTION RESPIRATORY (INHALATION) at 03:31

## 2018-03-29 RX ADMIN — PROPOFOL PRN MLS/HR: 10 INJECTION, EMULSION INTRAVENOUS at 12:43

## 2018-03-29 RX ADMIN — DIAZEPAM SCH MG: 10 TABLET ORAL at 05:27

## 2018-03-29 RX ADMIN — METHYLPREDNISOLONE SODIUM SUCCINATE SCH MG: 40 INJECTION, POWDER, FOR SOLUTION INTRAMUSCULAR; INTRAVENOUS at 05:27

## 2018-03-29 RX ADMIN — CHLORHEXIDINE GLUCONATE 0.12% ORAL RINSE SCH ML: 1.2 LIQUID ORAL at 20:21

## 2018-03-29 RX ADMIN — HUMAN INSULIN SCH: 100 INJECTION, SOLUTION SUBCUTANEOUS at 04:00

## 2018-03-29 RX ADMIN — AMIODARONE HYDROCHLORIDE PRN MLS/HR: 50 INJECTION, SOLUTION INTRAVENOUS at 15:00

## 2018-03-29 RX ADMIN — HEPARIN SODIUM SCH MLS/HR: 10000 INJECTION, SOLUTION INTRAVENOUS; SUBCUTANEOUS at 23:08

## 2018-03-29 RX ADMIN — Medication PRN MLS/HR: at 21:40

## 2018-03-29 RX ADMIN — HUMAN INSULIN SCH: 100 INJECTION, SOLUTION SUBCUTANEOUS at 13:17

## 2018-03-29 RX ADMIN — TAZOBACTAM SODIUM AND PIPERACILLIN SODIUM SCH MLS/HR: 250; 2 INJECTION, SOLUTION INTRAVENOUS at 02:21

## 2018-03-29 RX ADMIN — DIAZEPAM SCH MG: 10 TABLET ORAL at 21:40

## 2018-03-29 RX ADMIN — FAMOTIDINE SCH MG: 20 TABLET, FILM COATED ORAL at 20:22

## 2018-03-29 RX ADMIN — AZITHROMYCIN SCH MLS/HR: 500 INJECTION, POWDER, LYOPHILIZED, FOR SOLUTION INTRAVENOUS at 11:27

## 2018-03-29 RX ADMIN — DIAZEPAM SCH MG: 10 TABLET ORAL at 14:21

## 2018-03-29 RX ADMIN — IPRATROPIUM BROMIDE AND ALBUTEROL SULFATE SCH AMPULE: .5; 3 SOLUTION RESPIRATORY (INHALATION) at 21:59

## 2018-03-29 RX ADMIN — FAMOTIDINE SCH MG: 20 TABLET, FILM COATED ORAL at 08:45

## 2018-03-29 RX ADMIN — FOLIC ACID SCH MLS/HR: 5 INJECTION, SOLUTION INTRAMUSCULAR; INTRAVENOUS; SUBCUTANEOUS at 01:21

## 2018-03-29 RX ADMIN — HUMAN INSULIN SCH: 100 INJECTION, SOLUTION SUBCUTANEOUS at 17:10

## 2018-03-29 RX ADMIN — IPRATROPIUM BROMIDE AND ALBUTEROL SULFATE SCH AMPULE: .5; 3 SOLUTION RESPIRATORY (INHALATION) at 10:25

## 2018-03-29 RX ADMIN — CHLORHEXIDINE GLUCONATE 0.12% ORAL RINSE SCH ML: 1.2 LIQUID ORAL at 08:47

## 2018-03-29 RX ADMIN — VANCOMYCIN HYDROCHLORIDE SCH MLS/HR: 1 INJECTION, SOLUTION INTRAVENOUS at 01:22

## 2018-03-29 RX ADMIN — PROPOFOL PRN MLS/HR: 10 INJECTION, EMULSION INTRAVENOUS at 03:59

## 2018-03-29 RX ADMIN — SODIUM CHLORIDE SCH MLS/HR: 234 INJECTION INTRAMUSCULAR; INTRAVENOUS; SUBCUTANEOUS at 03:59

## 2018-03-29 RX ADMIN — ACYCLOVIR SCH TAB: 800 TABLET ORAL at 08:45

## 2018-03-29 RX ADMIN — IPRATROPIUM BROMIDE AND ALBUTEROL SULFATE SCH AMPULE: .5; 3 SOLUTION RESPIRATORY (INHALATION) at 15:51

## 2018-03-29 RX ADMIN — Medication PRN MLS/HR: at 10:22

## 2018-03-29 RX ADMIN — CHLORHEXIDINE GLUCONATE SCH PACK: 500 CLOTH TOPICAL at 04:00

## 2018-03-29 RX ADMIN — METHYLPREDNISOLONE SODIUM SUCCINATE SCH MG: 40 INJECTION, POWDER, FOR SOLUTION INTRAMUSCULAR; INTRAVENOUS at 14:21

## 2018-03-29 NOTE — RADRPT
EXAM DATE/TIME:  03/29/2018 11:55 

 

[facility] COMPARISON:     

CT THORAX W/O CONTRAST, March 26, 2018, 13:28.

 

 

INDICATIONS :     

Pneumonia.

                   

 

RADIATION DOSE:     

11.42 CTDIvol (mGy) 

 

 

MEDICAL HISTORY :     

Deep venous thrombosis. Chronic obstructive pulmonary disease. Cardiovascular disease Hypertension. D
iabetes.  Pulmonary embolism. 

 

SURGICAL HISTORY :      

None.  

 

ENCOUNTER:      

Initial

 

ACUITY:      

1 day

 

PAIN SCALE:      

0/10

 

LOCATION:        

chest 

 

TECHNIQUE:      

Volumetric scanning of the chest was performed.  Using automated exposure control and adjustment of t
he mA and/or kV according to patient size, radiation dose was kept as low as reasonably achievable to
 obtain optimal diagnostic quality images.  DICOM format image data is available electronically for r
eview and comparison.  

 

Follow-up recommendations for detected pulmonary nodules are based at a minimum on nodule size and pa
tient risk factors according to Fleischner Society Guidelines.

 

FINDINGS:     

 

LUNGS:     

Compared to the prior study there has been significant improvement with the scattered areas of inters
titial and airspace disease especially in the upper lung fields. The upper lung fields are better aer
ated on today's examination. There continues to be some infiltrates in both lung bases along with sma
ll effusions. There is some compressive atelectasis in both lung bases.

 

PLEURAE:     

Small bilateral effusions.

 

MEDIASTINUM:     

The heart and great vessels demonstrate no acute abnormality.  There is no mediastinal or hilar lymph
adenopathy. Heart size is enlarged but stable.

 

AXILLAE:     

Within normal limits.  No lymphadenopathy.

 

MUSCULOSKELETAL:     

Within normal limits for patient age.

 

MISCELLANEOUS:     

The visualized upper abdominal organs demonstrate no acute abnormality.

 

CONCLUSION:     

There has been overall improvement with increased aeration of both upper lung fields and improving re
solution of the previously noted scattered interstitial and airspace infiltrates especially in both u
pper lung fields. There continues to be infiltrates in both lung bases with some compressive atelecta
sis. Small bilateral effusions.

 

 

 

 

 Hermelindo Rodriguez MD on March 29, 2018 at 13:27           

Board Certified Radiologist.

 This report was verified electronically.

## 2018-03-30 VITALS
HEART RATE: 98 BPM | DIASTOLIC BLOOD PRESSURE: 79 MMHG | RESPIRATION RATE: 19 BRPM | OXYGEN SATURATION: 100 % | SYSTOLIC BLOOD PRESSURE: 127 MMHG

## 2018-03-30 VITALS
RESPIRATION RATE: 15 BRPM | SYSTOLIC BLOOD PRESSURE: 110 MMHG | HEART RATE: 96 BPM | OXYGEN SATURATION: 96 % | DIASTOLIC BLOOD PRESSURE: 71 MMHG

## 2018-03-30 VITALS
OXYGEN SATURATION: 93 % | DIASTOLIC BLOOD PRESSURE: 74 MMHG | HEART RATE: 92 BPM | SYSTOLIC BLOOD PRESSURE: 105 MMHG | RESPIRATION RATE: 25 BRPM

## 2018-03-30 VITALS
HEART RATE: 86 BPM | TEMPERATURE: 97.6 F | SYSTOLIC BLOOD PRESSURE: 121 MMHG | DIASTOLIC BLOOD PRESSURE: 83 MMHG | OXYGEN SATURATION: 100 % | RESPIRATION RATE: 20 BRPM

## 2018-03-30 VITALS
TEMPERATURE: 97.3 F | RESPIRATION RATE: 19 BRPM | OXYGEN SATURATION: 100 % | HEART RATE: 82 BPM | DIASTOLIC BLOOD PRESSURE: 69 MMHG | SYSTOLIC BLOOD PRESSURE: 102 MMHG

## 2018-03-30 VITALS
SYSTOLIC BLOOD PRESSURE: 108 MMHG | OXYGEN SATURATION: 99 % | HEART RATE: 102 BPM | TEMPERATURE: 97.9 F | RESPIRATION RATE: 22 BRPM | DIASTOLIC BLOOD PRESSURE: 87 MMHG

## 2018-03-30 VITALS
SYSTOLIC BLOOD PRESSURE: 105 MMHG | OXYGEN SATURATION: 100 % | HEART RATE: 84 BPM | RESPIRATION RATE: 20 BRPM | DIASTOLIC BLOOD PRESSURE: 72 MMHG

## 2018-03-30 VITALS
RESPIRATION RATE: 20 BRPM | OXYGEN SATURATION: 100 % | DIASTOLIC BLOOD PRESSURE: 70 MMHG | HEART RATE: 94 BPM | SYSTOLIC BLOOD PRESSURE: 107 MMHG

## 2018-03-30 VITALS
TEMPERATURE: 97.6 F | SYSTOLIC BLOOD PRESSURE: 124 MMHG | OXYGEN SATURATION: 99 % | RESPIRATION RATE: 19 BRPM | DIASTOLIC BLOOD PRESSURE: 77 MMHG | HEART RATE: 92 BPM

## 2018-03-30 VITALS — HEART RATE: 99 BPM

## 2018-03-30 VITALS
HEART RATE: 96 BPM | OXYGEN SATURATION: 99 % | TEMPERATURE: 98.2 F | DIASTOLIC BLOOD PRESSURE: 70 MMHG | RESPIRATION RATE: 20 BRPM | SYSTOLIC BLOOD PRESSURE: 101 MMHG

## 2018-03-30 VITALS
SYSTOLIC BLOOD PRESSURE: 92 MMHG | HEART RATE: 86 BPM | DIASTOLIC BLOOD PRESSURE: 67 MMHG | RESPIRATION RATE: 20 BRPM | OXYGEN SATURATION: 100 %

## 2018-03-30 VITALS
HEART RATE: 86 BPM | SYSTOLIC BLOOD PRESSURE: 95 MMHG | OXYGEN SATURATION: 99 % | RESPIRATION RATE: 19 BRPM | DIASTOLIC BLOOD PRESSURE: 68 MMHG

## 2018-03-30 VITALS
HEART RATE: 90 BPM | RESPIRATION RATE: 20 BRPM | DIASTOLIC BLOOD PRESSURE: 75 MMHG | OXYGEN SATURATION: 99 % | SYSTOLIC BLOOD PRESSURE: 118 MMHG

## 2018-03-30 VITALS
OXYGEN SATURATION: 99 % | DIASTOLIC BLOOD PRESSURE: 64 MMHG | RESPIRATION RATE: 20 BRPM | HEART RATE: 88 BPM | SYSTOLIC BLOOD PRESSURE: 114 MMHG

## 2018-03-30 VITALS — OXYGEN SATURATION: 98 %

## 2018-03-30 VITALS — HEART RATE: 108 BPM

## 2018-03-30 VITALS
RESPIRATION RATE: 20 BRPM | DIASTOLIC BLOOD PRESSURE: 61 MMHG | OXYGEN SATURATION: 99 % | SYSTOLIC BLOOD PRESSURE: 102 MMHG | HEART RATE: 94 BPM

## 2018-03-30 VITALS — OXYGEN SATURATION: 99 % | RESPIRATION RATE: 19 BRPM | HEART RATE: 90 BPM

## 2018-03-30 VITALS — HEART RATE: 96 BPM

## 2018-03-30 VITALS
OXYGEN SATURATION: 99 % | DIASTOLIC BLOOD PRESSURE: 71 MMHG | SYSTOLIC BLOOD PRESSURE: 104 MMHG | HEART RATE: 90 BPM | RESPIRATION RATE: 19 BRPM

## 2018-03-30 VITALS
HEART RATE: 96 BPM | DIASTOLIC BLOOD PRESSURE: 62 MMHG | SYSTOLIC BLOOD PRESSURE: 102 MMHG | RESPIRATION RATE: 19 BRPM | OXYGEN SATURATION: 99 %

## 2018-03-30 VITALS
HEART RATE: 96 BPM | SYSTOLIC BLOOD PRESSURE: 123 MMHG | RESPIRATION RATE: 20 BRPM | OXYGEN SATURATION: 100 % | DIASTOLIC BLOOD PRESSURE: 69 MMHG

## 2018-03-30 VITALS
SYSTOLIC BLOOD PRESSURE: 117 MMHG | DIASTOLIC BLOOD PRESSURE: 71 MMHG | OXYGEN SATURATION: 100 % | RESPIRATION RATE: 20 BRPM | HEART RATE: 86 BPM

## 2018-03-30 VITALS — RESPIRATION RATE: 20 BRPM | OXYGEN SATURATION: 98 % | HEART RATE: 104 BPM

## 2018-03-30 VITALS
OXYGEN SATURATION: 100 % | HEART RATE: 98 BPM | DIASTOLIC BLOOD PRESSURE: 78 MMHG | RESPIRATION RATE: 20 BRPM | SYSTOLIC BLOOD PRESSURE: 133 MMHG

## 2018-03-30 VITALS
SYSTOLIC BLOOD PRESSURE: 95 MMHG | DIASTOLIC BLOOD PRESSURE: 65 MMHG | HEART RATE: 90 BPM | OXYGEN SATURATION: 99 % | RESPIRATION RATE: 19 BRPM

## 2018-03-30 VITALS
RESPIRATION RATE: 19 BRPM | SYSTOLIC BLOOD PRESSURE: 122 MMHG | DIASTOLIC BLOOD PRESSURE: 75 MMHG | OXYGEN SATURATION: 98 % | HEART RATE: 94 BPM

## 2018-03-30 VITALS — HEART RATE: 98 BPM

## 2018-03-30 VITALS — HEART RATE: 100 BPM

## 2018-03-30 VITALS
RESPIRATION RATE: 19 BRPM | SYSTOLIC BLOOD PRESSURE: 129 MMHG | HEART RATE: 96 BPM | OXYGEN SATURATION: 100 % | DIASTOLIC BLOOD PRESSURE: 80 MMHG

## 2018-03-30 VITALS — RESPIRATION RATE: 20 BRPM | OXYGEN SATURATION: 99 % | HEART RATE: 96 BPM

## 2018-03-30 VITALS — HEART RATE: 89 BPM

## 2018-03-30 VITALS — OXYGEN SATURATION: 100 %

## 2018-03-30 VITALS — HEART RATE: 94 BPM

## 2018-03-30 VITALS
DIASTOLIC BLOOD PRESSURE: 68 MMHG | HEART RATE: 102 BPM | RESPIRATION RATE: 20 BRPM | OXYGEN SATURATION: 98 % | SYSTOLIC BLOOD PRESSURE: 119 MMHG

## 2018-03-30 VITALS — HEART RATE: 91 BPM

## 2018-03-30 VITALS — OXYGEN SATURATION: 99 %

## 2018-03-30 VITALS — HEART RATE: 112 BPM

## 2018-03-30 VITALS — HEART RATE: 90 BPM

## 2018-03-30 LAB
ALBUMIN SERPL-MCNC: 2.1 GM/DL (ref 3.4–5)
ALP SERPL-CCNC: 130 U/L (ref 45–117)
AST SERPL-CCNC: 397 U/L (ref 15–37)
BILIRUB SERPL-MCNC: 1.8 MG/DL (ref 0.2–1)
BUN SERPL-MCNC: 86 MG/DL (ref 7–18)
BUN SERPL-MCNC: 91 MG/DL (ref 7–18)
C3 SERPL-MCNC: 47 MG/DL (ref 90–180)
C4 SERPL-MCNC: 12 MG/DL (ref 10–40)
CALCIUM SERPL-MCNC: 6.4 MG/DL (ref 8.5–10.1)
CALCIUM SERPL-MCNC: 6.7 MG/DL (ref 8.5–10.1)
CALCIUM TP COR SERPL-MCNC: 7.2 MG/DL (ref 8.5–10.1)
CALCIUM TP COR SERPL-MCNC: 7.7 MG/DL (ref 8.5–10.1)
CHLORIDE SERPL-SCNC: 102 MEQ/L (ref 98–107)
CHLORIDE SERPL-SCNC: 99 MEQ/L (ref 98–107)
CREAT SERPL-MCNC: 4.1 MG/DL (ref 0.6–1.3)
CREAT SERPL-MCNC: 4.2 MG/DL (ref 0.6–1.3)
ERYTHROCYTE [DISTWIDTH] IN BLOOD BY AUTOMATED COUNT: 22.4 % (ref 11.6–17.2)
GFR SERPLBLD BASED ON 1.73 SQ M-ARVRAT: 14 ML/MIN (ref 89–?)
GFR SERPLBLD BASED ON 1.73 SQ M-ARVRAT: 15 ML/MIN (ref 89–?)
GLUCOSE SERPL-MCNC: 191 MG/DL (ref 74–106)
GLUCOSE SERPL-MCNC: 219 MG/DL (ref 74–106)
HCO3 BLD-SCNC: 21 MEQ/L (ref 21–32)
HCO3 BLD-SCNC: 22.1 MEQ/L (ref 21–32)
HCT VFR BLD CALC: 27.3 % (ref 39–51)
HGB BLD-MCNC: 8.6 GM/DL (ref 13–17)
INR PPP: 1.1 RATIO
MAGNESIUM SERPL-MCNC: 2.4 MG/DL (ref 1.5–2.5)
MCH RBC QN AUTO: 23 PG (ref 27–34)
MCHC RBC AUTO-ENTMCNC: 31.3 % (ref 32–36)
MCV RBC AUTO: 73.7 FL (ref 80–100)
OSMOLALITY SERPL: 310 MOSM/KG (ref 275–295)
OSMOLALITY,URINE: 300 MOSM/KG (ref 300–1300)
PHOSPHATE SERPL-MCNC: 7.3 MG/DL (ref 2.5–4.9)
PLATELET # BLD: 57 TH/MM3 (ref 150–450)
PMV BLD AUTO: 9.1 FL (ref 7–11)
PROT SERPL-MCNC: 5.1 GM/DL (ref 6.4–8.2)
PROT SERPL-MCNC: 5.4 GM/DL (ref 6.4–8.2)
PROTHROMBIN TIME: 11.4 SEC (ref 9.8–11.6)
RBC # BLD AUTO: 3.71 MIL/MM3 (ref 4.5–5.9)
SODIUM SERPL-SCNC: 135 MEQ/L (ref 136–145)
SODIUM SERPL-SCNC: 136 MEQ/L (ref 136–145)
SODIUM,RANDOM URINE: 108 MEQ/L
WBC # BLD AUTO: 10.5 TH/MM3 (ref 4–11)

## 2018-03-30 RX ADMIN — STANDARDIZED SENNA CONCENTRATE AND DOCUSATE SODIUM SCH TAB: 8.6; 5 TABLET, FILM COATED ORAL at 09:00

## 2018-03-30 RX ADMIN — HEPARIN SODIUM PRN MLS/HR: 10000 INJECTION, SOLUTION INTRAVENOUS at 14:00

## 2018-03-30 RX ADMIN — FUROSEMIDE SCH MG: 10 INJECTION, SOLUTION INTRAMUSCULAR; INTRAVENOUS at 13:38

## 2018-03-30 RX ADMIN — TAZOBACTAM SODIUM AND PIPERACILLIN SODIUM SCH MLS/HR: 250; 2 INJECTION, SOLUTION INTRAVENOUS at 16:01

## 2018-03-30 RX ADMIN — TAZOBACTAM SODIUM AND PIPERACILLIN SODIUM SCH MLS/HR: 250; 2 INJECTION, SOLUTION INTRAVENOUS at 02:56

## 2018-03-30 RX ADMIN — AZITHROMYCIN SCH MLS/HR: 500 INJECTION, POWDER, LYOPHILIZED, FOR SOLUTION INTRAVENOUS at 10:00

## 2018-03-30 RX ADMIN — HUMAN INSULIN SCH: 100 INJECTION, SOLUTION SUBCUTANEOUS at 10:20

## 2018-03-30 RX ADMIN — TAZOBACTAM SODIUM AND PIPERACILLIN SODIUM SCH MLS/HR: 250; 2 INJECTION, SOLUTION INTRAVENOUS at 10:21

## 2018-03-30 RX ADMIN — VANCOMYCIN HYDROCHLORIDE SCH MLS/HR: 1 INJECTION, SOLUTION INTRAVENOUS at 01:58

## 2018-03-30 RX ADMIN — METHYLPREDNISOLONE SODIUM SUCCINATE SCH MG: 40 INJECTION, POWDER, FOR SOLUTION INTRAMUSCULAR; INTRAVENOUS at 23:03

## 2018-03-30 RX ADMIN — FOLIC ACID SCH MLS/HR: 5 INJECTION, SOLUTION INTRAMUSCULAR; INTRAVENOUS; SUBCUTANEOUS at 01:54

## 2018-03-30 RX ADMIN — STANDARDIZED SENNA CONCENTRATE AND DOCUSATE SODIUM SCH TAB: 8.6; 5 TABLET, FILM COATED ORAL at 20:36

## 2018-03-30 RX ADMIN — METHYLPREDNISOLONE SODIUM SUCCINATE SCH MG: 40 INJECTION, POWDER, FOR SOLUTION INTRAMUSCULAR; INTRAVENOUS at 05:41

## 2018-03-30 RX ADMIN — TAZOBACTAM SODIUM AND PIPERACILLIN SODIUM SCH MLS/HR: 250; 2 INJECTION, SOLUTION INTRAVENOUS at 20:36

## 2018-03-30 RX ADMIN — HEPARIN SODIUM SCH MLS/HR: 10000 INJECTION, SOLUTION INTRAVENOUS; SUBCUTANEOUS at 13:47

## 2018-03-30 RX ADMIN — AMIODARONE HYDROCHLORIDE PRN MLS/HR: 50 INJECTION, SOLUTION INTRAVENOUS at 03:04

## 2018-03-30 RX ADMIN — IPRATROPIUM BROMIDE AND ALBUTEROL SULFATE SCH AMPULE: .5; 3 SOLUTION RESPIRATORY (INHALATION) at 11:06

## 2018-03-30 RX ADMIN — HUMAN INSULIN SCH: 100 INJECTION, SOLUTION SUBCUTANEOUS at 16:08

## 2018-03-30 RX ADMIN — CHLORHEXIDINE GLUCONATE 0.12% ORAL RINSE SCH ML: 1.2 LIQUID ORAL at 19:48

## 2018-03-30 RX ADMIN — FAMOTIDINE SCH MG: 20 TABLET, FILM COATED ORAL at 20:36

## 2018-03-30 RX ADMIN — IPRATROPIUM BROMIDE AND ALBUTEROL SULFATE SCH AMPULE: .5; 3 SOLUTION RESPIRATORY (INHALATION) at 03:45

## 2018-03-30 RX ADMIN — MUPIROCIN CALCIUM SCH APPLIC: 20 OINTMENT TOPICAL at 09:00

## 2018-03-30 RX ADMIN — MUPIROCIN CALCIUM SCH APPLIC: 20 OINTMENT TOPICAL at 20:36

## 2018-03-30 RX ADMIN — HUMAN INSULIN SCH: 100 INJECTION, SOLUTION SUBCUTANEOUS at 20:36

## 2018-03-30 RX ADMIN — HUMAN INSULIN SCH: 100 INJECTION, SOLUTION SUBCUTANEOUS at 00:39

## 2018-03-30 RX ADMIN — VANCOMYCIN HYDROCHLORIDE SCH MLS/HR: 1 INJECTION, SOLUTION INTRAVENOUS at 13:39

## 2018-03-30 RX ADMIN — DIAZEPAM SCH MG: 10 TABLET ORAL at 05:41

## 2018-03-30 RX ADMIN — DIAZEPAM SCH MG: 10 TABLET ORAL at 14:40

## 2018-03-30 RX ADMIN — ALBUMIN HUMAN SCH MLS/HR: 0.25 SOLUTION INTRAVENOUS at 06:45

## 2018-03-30 RX ADMIN — PROPOFOL PRN MLS/HR: 10 INJECTION, EMULSION INTRAVENOUS at 10:15

## 2018-03-30 RX ADMIN — FAMOTIDINE SCH MG: 20 TABLET, FILM COATED ORAL at 10:21

## 2018-03-30 RX ADMIN — HUMAN INSULIN SCH: 100 INJECTION, SOLUTION SUBCUTANEOUS at 14:04

## 2018-03-30 RX ADMIN — HEPARIN SODIUM SCH MLS/HR: 10000 INJECTION, SOLUTION INTRAVENOUS; SUBCUTANEOUS at 10:10

## 2018-03-30 RX ADMIN — IPRATROPIUM BROMIDE AND ALBUTEROL SULFATE SCH AMPULE: .5; 3 SOLUTION RESPIRATORY (INHALATION) at 20:49

## 2018-03-30 RX ADMIN — METHYLPREDNISOLONE SODIUM SUCCINATE SCH MG: 40 INJECTION, POWDER, FOR SOLUTION INTRAMUSCULAR; INTRAVENOUS at 13:38

## 2018-03-30 RX ADMIN — CHLORHEXIDINE GLUCONATE 0.12% ORAL RINSE SCH ML: 1.2 LIQUID ORAL at 08:00

## 2018-03-30 RX ADMIN — CHLORHEXIDINE GLUCONATE SCH PACK: 500 CLOTH TOPICAL at 04:00

## 2018-03-30 RX ADMIN — IPRATROPIUM BROMIDE AND ALBUTEROL SULFATE SCH AMPULE: .5; 3 SOLUTION RESPIRATORY (INHALATION) at 15:58

## 2018-03-30 RX ADMIN — Medication PRN MLS/HR: at 10:15

## 2018-03-30 RX ADMIN — FUROSEMIDE SCH MG: 10 INJECTION, SOLUTION INTRAMUSCULAR; INTRAVENOUS at 18:38

## 2018-03-30 RX ADMIN — ALBUMIN HUMAN SCH MLS/HR: 0.25 SOLUTION INTRAVENOUS at 13:38

## 2018-03-30 RX ADMIN — ALBUMIN HUMAN SCH MLS/HR: 0.25 SOLUTION INTRAVENOUS at 18:45

## 2018-03-30 RX ADMIN — DIAZEPAM SCH MG: 10 TABLET ORAL at 23:02

## 2018-03-30 RX ADMIN — AMIODARONE HYDROCHLORIDE SCH MG: 200 TABLET ORAL at 16:01

## 2018-03-30 RX ADMIN — ACYCLOVIR SCH TAB: 800 TABLET ORAL at 10:21

## 2018-03-30 RX ADMIN — HUMAN INSULIN SCH: 100 INJECTION, SOLUTION SUBCUTANEOUS at 05:41

## 2018-03-30 RX ADMIN — FUROSEMIDE SCH MG: 10 INJECTION, SOLUTION INTRAMUSCULAR; INTRAVENOUS at 06:42

## 2018-03-30 RX ADMIN — HEPARIN SODIUM PRN MLS/HR: 10000 INJECTION, SOLUTION INTRAVENOUS at 00:42

## 2018-03-30 NOTE — HHI.CCPN
Subjective


Remarks/Hospital Course


3/27: overnight, started to become purposeful in upper extremities, and pupils 

are 3mm and now reactive. remains in shock with shock liver, acute kidney injury

, multiorgan failure. LFTs uptrending.





3/28: improvement in mental status. now following commands. discussion with 

sister: patient drinks heavily: 2-3 6-packs/day. liver enzymes still elevated, 

remains oliguric with Cr > 3 now and rising. albumin continues to fall which is 

likely combination of very poor nutritional status and poor liver synthetic 

function. blood sugar slightly improved. overall still in multiorgan failure. 

family appears to have poor insight into patient's condition: sister herself is 

going through rehab for a recent severe illness. now also starting tremors 

which may be early alcohol withdraw given new clinical history to suggest large 

etoh intake.





3/29: improvements in hemodynamics and off vasopressors. wbc downtrending. but 

no improvement and worsening in metabolic organ failure: Cr higher, remains 

oliguric. LFTs still high. remains on d10w and tube feeds with marginal blood 

glucose. acidosis continues to worsen despite adequate cardiac index.





3/30: kidney function continues to worsen, though uop slightly improved from 

yesterday. remains off vasopressors. afib RVR yesterday now controlled with 

amiodarone. overall no meaningful improvements- acidosis still too severe to 

allow extubation and renal function complicating acidosis. very poor dialysis 

candidate given overall functionality and current acute illness.





Objective





Vital Signs








  Date Time  Temp Pulse Resp B/P (MAP) Pulse Ox O2 Delivery O2 Flow Rate FiO2


 


3/30/18 06:01  90 19 95/65 (75) 99   


 


3/30/18 04:00        40


 


3/30/18 03:01 97.6       


 


3/26/18 16:32      Ventilator  














Intake and Output   


 


 3/30/18 3/30/18 3/31/18





 08:00 16:00 00:00


 


Intake Total 2699 ml  


 


Output Total 725 ml  


 


Balance 1974 ml  








Result Diagram:  


3/30/18 0410                                                                   

             3/30/18 0410





Other Results





Microbiology








 Date/Time


Source Procedure


Growth Status


 


 


 3/27/18 09:30


Urine Catheterized Urine Legionella Antigen - Final


PRESUMPTIVE NEGATIVE FOR LEGIONELLA P... Complete


 


 3/27/18 09:30


Urine Catheterized Urine Streptococcus pneumoniae Antigen (M - Final


PRESUMPTIVE NEGATIVE FOR STREPTOCOCCU... Complete








Laboratory Tests








Test


  3/29/18


13:35


 


Blood Gas Puncture Site ART LINE 


 


Blood Gas Patient Temperature 37.0 


 


Blood Gas HCO3


  14 mmol/L


(22-26)


 


Blood Gas Base Excess


  -13.5 mmol/L


(-2-2)


 


Blood Gas Oxygen Saturation 92 % () 


 


Arterial Blood pH


  7.15


(7.380-7.420)


 


Arterial Blood Partial


Pressure CO2 40 mmHg


(38-42)


 


Arterial Blood Partial


Pressure O2 90 mmHg


()


 


Arterial Blood Oxygen Content


  13.0 Vol %


(12.0-20.0)


 


Arterial Blood


Carboxyhemoglobin 1.1 % (0-4) 


 


 


Arterial Blood Methemoglobin 1.6 % (0-2) 


 


Blood Gas Hemoglobin


  10.0 G/DL


(12.0-16.0)


 


Oxygen Delivery Device VENTILATOR 


 


Blood Gas Ventilator Setting 20/550/PEEP 10 


 


Blood Gas Inspired Oxygen 50 % 








Objective Remarks


gen: elderly male, lying in bed, intubated, sedated


heent: pupils 3mm, equal, reactive. mucous membranes moist. nc. at.


neck: no jvd. trachea midline.


chest: equal chest rise. coarse breath sounds. left subclavian cvl in place, 

site c/d/i.


cv: normal rate, irregularly irregular rhythm. afib.


abd: soft, nontender, nondistended. no guarding.


extr: distal pulses 2+. no peripheral edema. right radial art line in place, 

site c/d/i.


neuro: RASS -2. GCS 10 ([id number]). +cough. + gag. + corneals. follows commands.





A/P


Assessment and Plan


Assessment: 67yM who presented in extremis and progressed rapidly to PEA arrest

, most likely secondary to septic shock. Blood with Coag negative staph in 4/4 

bottles. Unclear source of infection as sputum and urine cultures have not 

produced organism. He remains in multiorgan failure and his liver and kidneys 

are still severely dysfunctional without signs of improvement. remains 

critically ill. After long discusion with his sister and medical decisionmaker, 

plan to make him DNR with continued aggressive care.  Acidosis and renal 

failure are biggest barriers today. will consult nephrology, although he would 

be an exceedingly poor renal replacement candidate given his pre-hospital poor 

functional status, his critical illness, and his multi organ dysfunction.  

Aggressive goals remain and he continues to be critically ill and off pathway.





Plan by systems:





Neurologic:


Hypoxic-ischemic encephalopathy


Metabolic encephalopathy - improving


Alcohol Dependence


Alcohol withdraw syndrome


Frequent neuro checks


Propofol for goal RASS -2


iv thiamine, mvi


valium 10mg per tube q8h: wean to 5mg po q8h today.


start clonidine 0.2mg po q8h.





Respiratory:


Acute hypoxic and hypercarbic respiratory failure - persistent.


Vent bundle


Head of bed at 30


Nebs


remains with severe metabolic acidosis. unable to start SBT due to acidosis.


Wean FiO2 for goal SPO2 greater than 90%


CT chest 3/29: resolving consolidation without significant effusion or empyema.





Cardiovascular:


Septic shock - resolving.


Status post PEA arrest


Atrial fibrillation with rapid ventricular response


continue amiodarone drip until 24h completes at 1500, then transition to 200mg 

po q12h.


start clonidine 0.2mg po q8h








Renal:


Acute kidney injury-worsening


Secondary septic shock


Continue Haynes today


renal ultrasound 3/28: no evidence of hydronephrosis.


-- Strict I/Os


send urine electrolytes, urine eos, complements c3/4.


nephrology consult.


again, very poor dialysis candidate.


very volume overloaded. increase diuresis to lasix 100mg iv q6h and give with 

albumin 25gm q6h.


diuril 500mg iv x 1.





FEN/GI:


Acute protein calorie malnutrition -severe


Shock liver


Hyperkalemia


tube feeds


Trend LFTs


nutrition consult





Heme/ID:


Septic shock


Leukocytosis


Continue vancomycin


Continue Zosyn


Continue azithromycin


urine and sputum cultures NG


Blood cultures, coag negative staph 4/4 bottles


TTE negative for overt vegetations. will repeat blood cultures today. if 

positive, may need to consider SHAYE. 


overt source most likely pulmonary as he clinically still has an HCAP pneumonia 

with sputum production, o2 requirement, LLL consolidation.


urinary legionella and pneumococcal Ag: negative.


wbc and fever curve trending down. 





culture data has not produced any sputum micro other than the coag neg staph in 

4/4 bottles. would plan to complete full empiric antibiotic course x 7 days (

anticipated stop date 4/1). if blood cultures are persistently positive, then 

needs to transition to oxacillin for a longer duration of therapy and consider 

SHAYE. if negative, it is possible that both bottles were drawn from same stick 

and are contaminated. 





Endocrine:


Hypoglycemia- improving.


Secondary to shock liver


d/c d10w


-- SSI





Prophylaxis:


GI Prophylaxis


IV Pepcid





DVT Prophylaxis


-- SCDs


heparin drip





Lines:


3/26 left subclavian triple-lumen catheter


3/26 right radial arterial line


Haynes





Dispo:


Remain in ICU.  Remains critically ill.





This patient remains critically ill with one or more organ systems which are or 

may become a threat to life.  I have spent in excess of 45 minutes 

discontinuously in the care and management of this patient.  This time is 

exclusive of procedures, and includes, but is not limited to, evaluation of the 

patient, review of the medical record, discussions with family, consultants, 

nursing staff, or respiratory therapy, and documentation in the medical record.











Earle Palacio MD Mar 30, 2018 06:45

## 2018-03-30 NOTE — HHI.HCPN
Reason for visit


   a.  To assist with evaluation and management of symptoms including:  dyspnea

; encephalopathy, pain, anxiety


   b.  To assist medical decision maker(s) with: better understanding of 

current medical conditions; weighing benefits/burdens of medical treatment 

options; making  medical treatment decisions.


.





Subjective/Interval History


Patient seen to follow-up on symptom management and goals of care.





Mr. Guerrero remains intubated and sedated.  During sedation vacation yesterday 

he was able to answer questions regarding pain and was following commands.  At 

this visit he is sedated on fentanyl, 200 mcg/h and propofol 10 mcg/kg/min.  He 

is not responding to tactile or verbal stimuli.  His sister is at bedside.  He 

is no longer requiring vasopressor support.  He developed atrial fibrillation 

with rapid ventricular response yesterday and amiodarone was initiated.  Rate 

is controlled today.  Renal indices continued to worsen and a nephrology 

consultation has been requested.  He is receiving bicarbonate, improving his 

acidosis.  ABG yesterday showed a pH of 7.15, today 7.30.  Liver enzymes 

increasing showing  total bilirubin 1.8, , ALT 2541, alkaline 

phosphatase 130.  Phosphorus is 8.0.  Sodium 136, potassium 4.8, BUN 86, 

creatinine 4.10, WBC 10.5, hemoglobin 8.6, hematocrit 27.3, platelets 57.  

Presenting blood culture was positive for Staphylococcus epidermidis.  Repeat 

blood cultures are pending.  Chest x-ray shows probable slight interval 

increase in effusions.


.


Family/friend interactions


His sister is at bedside and has received updates from Dr. Palacio and the RN.  

She is communicating with the patient's daughter and providing medical updates.

  The patient's son is estranged but there is some communication between the 

aunt and him Via Facebook.  Nephrology consultation was also done during this 

visit and she states that unless there would be a definitive benefit to his 

recovery with dialysis, he would not be in favor of doing procedures to him for 

the sake of doing procedures.  Per Dr. Riojas's conversation, there is no 

urgent need for dialysis and he recommends continuing IV fluid and diuretics 

for now.  Dialysis will be revisited if that is unsuccessful.  She did express 

that withdrawal of ventilator support was discussed by Dr. Palacio and while she 

is hoping that he does recover, she is not willing to keep him on the 

ventilator for an extended time.


.





Advance Directives


Living Will:  Copy in medical record


Health Care Surrogate:  Copy in medical record


Durable Power of :  Copy in medical record (DURABLE POWER OF  

for healthcare)


Advance Directive Specifics


Date completed:


2011


.


Health Care Surrogate(s):


He has named his sister Cee Dejesus as his primary healthcare surrogate and 

his other sister Sue Womack as his alternate


.


Documented care wishes:


Living will on chart.


.





Objective





Vital Signs








  Date Time  Temp Pulse Resp B/P (MAP) Pulse Ox O2 Delivery O2 Flow Rate FiO2


 


3/30/18 12:01  86 20 92/67 (75) 100   


 


3/30/18 12:00        35


 


3/30/18 12:00  96      


 


3/30/18 11:01  96 19 102/62 (75) 99   


 


3/30/18 10:01  94 20 102/61 (75) 99   


 


3/30/18 10:00  90 19  99   


 


3/30/18 09:01  102 20 119/68 (85) 98   


 


3/30/18 09:01  102 20 119/68 (85) 98   


 


3/30/18 09:00  104 20  98   


 


3/30/18 08:31     99   35


 


3/30/18 08:01  92 25 105/74 (84) 93   


 


3/30/18 08:00  98      


 


3/30/18 08:00        35


 


3/30/18 07:01 98.2 96 20 101/70 (80) 99   


 


3/30/18 07:00  96 20  99   


 


3/30/18 06:01  90 19 95/65 (75) 99   


 


3/30/18 06:00  108      


 


3/30/18 05:01  90 19 104/71 (82) 99   


 


3/30/18 04:01  96 15 110/71 (84) 96   


 


3/30/18 04:01  96 15 110/71 (84) 96   


 


3/30/18 04:00        40


 


3/30/18 04:00  112      


 


3/30/18 03:51     98   40


 


3/30/18 03:04  77  124/77    


 


3/30/18 03:01 97.6 92 19 124/77 (93) 99   


 


3/30/18 02:01  94 19 122/75 (91) 98   


 


3/30/18 02:00  91      


 


3/30/18 01:04     100   40


 


3/30/18 01:01  94 20 107/70 (82) 100   


 


3/30/18 00:01 97.3 82 19 102/69 (80) 100   


 


3/30/18 00:00        40


 


3/30/18 00:00  100      


 


3/29/18 23:01  84 16 122/77 (92) 100   


 


3/29/18 22:01  92 19 119/83 (95) 100   


 


3/29/18 22:00  93      


 


3/29/18 22:00     100   40


 


3/29/18 21:01  94 20 125/73 (90) 100   


 


3/29/18 20:01 97.6 98 20 112/76 (88) 99   


 


3/29/18 20:00  87      


 


3/29/18 20:00        50


 


3/29/18 19:39     100   50


 


3/29/18 19:01  96 20 113/77 (89) 100   


 


3/29/18 19:00  94 20  100   


 


3/29/18 18:01  96 7  100   


 


3/29/18 18:00  100 8 129/76 (93) 100   


 


3/29/18 18:00  108      


 


3/29/18 17:00  110 34  99   


 


3/29/18 17:00  110 34 112/71 (85) 99   


 


3/29/18 16:29     100   50


 


3/29/18 16:00  110 20 124/65 (84) 99   


 


3/29/18 16:00        35


 


3/29/18 16:00  98      


 


3/29/18 15:53     99   50


 


3/29/18 15:00  153  119/78    


 


3/29/18 15:00 97.3 118 20 117/69 (85) 97   


 


3/29/18 14:59  152  119/78    


 


3/29/18 14:58  148 20 119/78 (92) 97   


 


3/29/18 14:00  118      


 


3/29/18 14:00     95   50














Intake & Output  


 


 3/30/18 3/30/18





 07:00 19:00


 


Intake Total 2699 ml 


 


Output Total 725 ml 


 


Balance 1974 ml 


 


  


 


Intake Oral 0 ml 


 


IV Total 2137 ml 


 


Tube Feeding 442 ml 


 


Other 120 ml 


 


Output Urine Total 725 ml 


 


# Bowel Movements 0 








Physical Exam


CONSTITUTIONAL/GENERAL: This is an adequately nourished patient intubated, 

sedated, in no acute distress.


TUBES/LINES/DRAINS: Orotracheal tube; orogastric tube; Haynes catheter; soft 

wrist restraints; left subclavian CVL; peripheral IVs


SKIN: No jaundice, rashes.  There is a fading bruise on the inner right thigh.  

Other smaller ecchymoses on the extremities. . No wounds seen anteriorly. Skin 

temperature appropriate. Not diaphoretic. 


CARDIOVASCULAR: Controlled rate, irregular rhythm without murmurs, gallops, or 

rubs. No JVD. Peripheral pulses symmetric.


RESPIRATORY/CHEST: Lungs diminished, coarse breath sounds, rare wheeze.  On 

mechanical vent.


GASTROINTESTINAL: Abdomen soft, distended. No hepato-splenomegaly, or palpable 

masses. No guarding. Bowel sounds hypoactive.


GENITOURINARY: Without palpable bladder distension. Haynes catheter in place.


MUSCULOSKELETAL: Extremities without clubbing, cyanosis, or edema.  Slight 

mottling of the feet is noted.


NEUROLOGICAL: Unresponsive.  No spontaneous movements noted.  RN reports that 

he was responding to commands when on sedation vacation.


PSYCHIATRIC: Unable to assess due to level of responsiveness.


.





Diagnostic Tests


Laboratory





Laboratory Tests








Test


  3/28/18


04:27 3/28/18


06:28 3/28/18


08:03 3/28/18


21:00


 


White Blood Count


  18.0 TH/MM3


(4.0-11.0) 


  


  


 


 


Red Blood Count


  3.73 MIL/MM3


(4.50-5.90) 


  


  


 


 


Hemoglobin


  9.1 GM/DL


(13.0-17.0) 


  


  


 


 


Hematocrit


  28.0 %


(39.0-51.0) 


  


  


 


 


Mean Corpuscular Volume


  75.2 FL


(80.0-100.0) 


  


  


 


 


Mean Corpuscular Hemoglobin


  24.5 PG


(27.0-34.0) 


  


  


 


 


Mean Corpuscular Hemoglobin


Concent 32.6 %


(32.0-36.0) 


  


  


 


 


Red Cell Distribution Width


  23.0 %


(11.6-17.2) 


  


  


 


 


Platelet Count


  112 TH/MM3


(150-450) 


  


  


 


 


Mean Platelet Volume


  9.0 FL


(7.0-11.0) 


  


  


 


 


Prothrombin Time


  12.7 SEC


(9.8-11.6) 


  


  


 


 


Prothromb Time International


Ratio 1.3 RATIO 


  


  


  


 


 


Activated Partial


Thromboplast Time 40.1 SEC


(24.3-30.1) 


  


  77.4 SEC


(24.3-30.1)


 


Blood Urea Nitrogen


  50 MG/DL


(7-18) 


  


  


 


 


Creatinine


  3.10 MG/DL


(0.60-1.30) 


  


  


 


 


Random Glucose


  146 MG/DL


() 


  


  


 


 


Total Protein


  4.4 GM/DL


(6.4-8.2) 


  


  


 


 


Albumin


  1.7 GM/DL


(3.4-5.0) 


  


  


 


 


Calcium Level


  6.9 MG/DL


(8.5-10.1) 


  


  


 


 


Alkaline Phosphatase


  77 U/L


() 


  


  


 


 


Aspartate Amino Transf


(AST/SGOT) 847 U/L


(15-37) 


  


  


 


 


Alanine Aminotransferase


(ALT/SGPT) 2260 U/L


(12-78) 


  


  


 


 


Total Bilirubin


  1.2 MG/DL


(0.2-1.0) 


  


  


 


 


Sodium Level


  139 MEQ/L


(136-145) 


  


  


 


 


Potassium Level


  4.6 MEQ/L


(3.5-5.1) 


  


  


 


 


Chloride Level


  110 MEQ/L


() 


  


  


 


 


Carbon Dioxide Level


  17.2 MEQ/L


(21.0-32.0) 


  


  


 


 


Anion Gap


  12 MEQ/L


(5-15) 


  


  


 


 


Estimat Glomerular Filtration


Rate 20 ML/MIN


(>89) 


  


  


 


 


Protein Corrected Calcium


  8.4 MG/DL


(8.5-10.1) 


  


  


 


 


Blood Gas Puncture Site  KAI   


 


Blood Gas Patient Temperature  37.0   


 


Blood Gas HCO3


  


  16 mmol/L


(22-26) 


  


 


 


Blood Gas Base Excess


  


  -9.2 mmol/L


(-2-2) 


  


 


 


Blood Gas Oxygen Saturation  95 % ()   


 


Arterial Blood pH


  


  7.29


(7.380-7.420) 


  


 


 


Arterial Blood Partial


Pressure CO2 


  35 mmHg


(38-42) 


  


 


 


Arterial Blood Partial


Pressure O2 


  113 mmHg


() 


  


 


 


Arterial Blood Oxygen Content


  


  12.0 Vol %


(12.0-20.0) 


  


 


 


Arterial Blood


Carboxyhemoglobin 


  1.3 % (0-4) 


  


  


 


 


Arterial Blood Methemoglobin  1.6 % (0-2)   


 


Blood Gas Hemoglobin


  


  8.8 G/DL


(12.0-16.0) 


  


 


 


Oxygen Delivery Device  VENTILATOR   


 


Blood Gas Ventilator Setting  PRVC/AV   


 


Blood Gas Inspired Oxygen  40 %   


 


Prealbumin


  


  


  13 MG/DL


(20-40) 


 


 


Test


  3/29/18


02:34 3/29/18


04:30 3/29/18


06:10 3/29/18


09:25


 


Activated Partial


Thromboplast Time 64.7 SEC


(24.3-30.1) 83.9 SEC


(24.3-30.1) 


  


 


 


White Blood Count


  


  9.6 TH/MM3


(4.0-11.0) 


  


 


 


Red Blood Count


  


  3.62 MIL/MM3


(4.50-5.90) 


  


 


 


Hemoglobin


  


  8.4 GM/DL


(13.0-17.0) 


  


 


 


Hematocrit


  


  27.2 %


(39.0-51.0) 


  


 


 


Mean Corpuscular Volume


  


  75.2 FL


(80.0-100.0) 


  


 


 


Mean Corpuscular Hemoglobin


  


  23.1 PG


(27.0-34.0) 


  


 


 


Mean Corpuscular Hemoglobin


Concent 


  30.7 %


(32.0-36.0) 


  


 


 


Red Cell Distribution Width


  


  22.9 %


(11.6-17.2) 


  


 


 


Platelet Count


  


  85 TH/MM3


(150-450) 


  


 


 


Mean Platelet Volume


  


  10.7 FL


(7.0-11.0) 


  


 


 


Prothrombin Time


  


  10.9 SEC


(9.8-11.6) 


  


 


 


Prothromb Time International


Ratio 


  1.1 RATIO 


  


  


 


 


Blood Urea Nitrogen


  


  65 MG/DL


(7-18) 


  


 


 


Creatinine


  


  3.60 MG/DL


(0.60-1.30) 


  


 


 


Random Glucose


  


  169 MG/DL


() 


  


 


 


Total Protein


  


  5.1 GM/DL


(6.4-8.2) 


  


 


 


Albumin


  


  2.1 GM/DL


(3.4-5.0) 


  


 


 


Calcium Level


  


  6.8 MG/DL


(8.5-10.1) 


  


 


 


Alkaline Phosphatase


  


  70 U/L


() 


  


 


 


Aspartate Amino Transf


(AST/SGOT) 


  828 U/L


(15-37) 


  


 


 


Alanine Aminotransferase


(ALT/SGPT) 


  2801 U/L


(12-78) 


  


 


 


Total Bilirubin


  


  1.5 MG/DL


(0.2-1.0) 


  


 


 


Sodium Level


  


  136 MEQ/L


(136-145) 


  


 


 


Potassium Level


  


  5.0 MEQ/L


(3.5-5.1) 


  


 


 


Chloride Level


  


  106 MEQ/L


() 


  


 


 


Carbon Dioxide Level


  


  15.3 MEQ/L


(21.0-32.0) 


  


 


 


Anion Gap


  


  15 MEQ/L


(5-15) 


  


 


 


Estimat Glomerular Filtration


Rate 


  17 ML/MIN


(>89) 


  


 


 


Protein Corrected Calcium


  


  7.8 MG/DL


(8.5-10.1) 


  


 


 


Lipase


  


  118 U/L


() 


  


 


 


Blood Gas Puncture Site   KAI  


 


Blood Gas Patient Temperature   37.0  


 


Blood Gas HCO3


  


  


  13 mmol/L


(22-26) 


 


 


Blood Gas Base Excess


  


  


  -12.8 mmol/L


(-2-2) 


 


 


Blood Gas Oxygen Saturation   95 % ()  


 


Arterial Blood pH


  


  


  7.24


(7.380-7.420) 


 


 


Arterial Blood Partial


Pressure CO2 


  


  32 mmHg


(38-42) 


 


 


Arterial Blood Partial


Pressure O2 


  


  128 mmHg


() 


 


 


Arterial Blood Oxygen Content


  


  


  11.6 Vol %


(12.0-20.0) 


 


 


Arterial Blood


Carboxyhemoglobin 


  


  0.4 % (0-4) 


  


 


 


Arterial Blood Methemoglobin   1.0 % (0-2)  


 


Blood Gas Hemoglobin


  


  


  8.5 G/DL


(12.0-16.0) 


 


 


Oxygen Delivery Device   VENTILATOR  


 


Blood Gas Ventilator Setting   PRVC/AC  


 


Blood Gas Inspired Oxygen   35 %  


 


Miscellaneous Test Result     


 


Adenovirus (PCR)


  


  


  


  NOT DETECTED


(NOT DETECT)


 


Bordetella holmesii (PCR)


  


  


  


  NOT DETECTED


(NOT DETECT)


 


Bordetella pertussis DNA (PCR)


  


  


  


  NOT DETECTED


(NOT DETECT)


 


B. parapertussis/bronchi (PCR)


  


  


  


  NOT DETECTED


(NOT DETECT)


 


Human Metapneumovirus (PCR)


  


  


  


  NOT DETECTED


(NOT DETECT)


 


Influenza Type A (RT-PCR)


  


  


  


  NOT DETECTED


(NOT DETECT)


 


Influenza Type A (H1) (PCR)


  


  


  


  NOT DETECTED


(NOT DETECT)


 


Influenza Type A (H3) (PCR)


  


  


  


  NOT DETECTED


(NOT DETECT)


 


Influenza Type B (RT-PCR)


  


  


  


  NOT DETECTED


(NOT DETECT)


 


Parainfluenza Type 1 (PCR)


  


  


  


  NOT DETECTED


(NOT DETECT)


 


Parainfluenza Type 2 (PCR)


  


  


  


  NOT DETECTED


(NOT DETECT)


 


Parainfluenza Type 3 (PCR)


  


  


  


  NOT DETECTED


(NOT DETECT)


 


Parainfluenza Type 4 (PCR)


  


  


  


  NOT DETECTED


(NOT DETECT)


 


Resp Syncytial Virus Type A


(PCR) 


  


  


  NOT DETECTED


(NOT DETECT)


 


Resp Syncytial Virus Type B


(PCR) 


  


  


  NOT DETECTED


(NOT DETECT)


 


Rhinovirus (PCR)


  


  


  


  NOT DETECTED


(NOT DETECT)


 


Test


  3/29/18


13:35 3/29/18


14:35 3/29/18


19:35 3/30/18


04:10


 


Blood Gas Puncture Site ART LINE    


 


Blood Gas Patient Temperature 37.0    


 


Blood Gas HCO3


  14 mmol/L


(22-26) 


  


  


 


 


Blood Gas Base Excess


  -13.5 mmol/L


(-2-2) 


  


  


 


 


Blood Gas Oxygen Saturation 92 % ()    


 


Arterial Blood pH


  7.15


(7.380-7.420) 


  


  


 


 


Arterial Blood Partial


Pressure CO2 40 mmHg


(38-42) 


  


  


 


 


Arterial Blood Partial


Pressure O2 90 mmHg


() 


  


  


 


 


Arterial Blood Oxygen Content


  13.0 Vol %


(12.0-20.0) 


  


  


 


 


Arterial Blood


Carboxyhemoglobin 1.1 % (0-4) 


  


  


  


 


 


Arterial Blood Methemoglobin 1.6 % (0-2)    


 


Blood Gas Hemoglobin


  10.0 G/DL


(12.0-16.0) 


  


  


 


 


Oxygen Delivery Device VENTILATOR    


 


Blood Gas Ventilator Setting 20/550/PEEP 10    


 


Blood Gas Inspired Oxygen 50 %    


 


Lactic Acid Level


  


  5.7 mmol/L


(0.4-2.0) 2.4 mmol/L


(0.4-2.0) 


 


 


White Blood Count


  


  


  


  10.5 TH/MM3


(4.0-11.0)


 


Red Blood Count


  


  


  


  3.71 MIL/MM3


(4.50-5.90)


 


Hemoglobin


  


  


  


  8.6 GM/DL


(13.0-17.0)


 


Hematocrit


  


  


  


  27.3 %


(39.0-51.0)


 


Mean Corpuscular Volume


  


  


  


  73.7 FL


(80.0-100.0)


 


Mean Corpuscular Hemoglobin


  


  


  


  23.0 PG


(27.0-34.0)


 


Mean Corpuscular Hemoglobin


Concent 


  


  


  31.3 %


(32.0-36.0)


 


Red Cell Distribution Width


  


  


  


  22.4 %


(11.6-17.2)


 


Platelet Count


  


  


  


  57 TH/MM3


(150-450)


 


Mean Platelet Volume


  


  


  


  9.1 FL


(7.0-11.0)


 


Prothrombin Time


  


  


  


  11.4 SEC


(9.8-11.6)


 


Prothromb Time International


Ratio 


  


  


  1.1 RATIO 


 


 


Activated Partial


Thromboplast Time 


  


  


  GREATER THAN


277.5 SEC


 


Blood Urea Nitrogen


  


  


  


  86 MG/DL


(7-18)


 


Creatinine


  


  


  


  4.10 MG/DL


(0.60-1.30)


 


Random Glucose


  


  


  


  191 MG/DL


()


 


Total Protein


  


  


  


  5.1 GM/DL


(6.4-8.2)


 


Albumin


  


  


  


  2.1 GM/DL


(3.4-5.0)


 


Calcium Level


  


  


  


  6.7 MG/DL


(8.5-10.1)


 


Alkaline Phosphatase


  


  


  


  130 U/L


()


 


Aspartate Amino Transf


(AST/SGOT) 


  


  


  397 U/L


(15-37)


 


Alanine Aminotransferase


(ALT/SGPT) 


  


  


  2541 U/L


(12-78)


 


Total Bilirubin


  


  


  


  1.8 MG/DL


(0.2-1.0)


 


Sodium Level


  


  


  


  136 MEQ/L


(136-145)


 


Potassium Level


  


  


  


  4.8 MEQ/L


(3.5-5.1)


 


Chloride Level


  


  


  


  102 MEQ/L


()


 


Carbon Dioxide Level


  


  


  


  21.0 MEQ/L


(21.0-32.0)


 


Anion Gap


  


  


  


  13 MEQ/L


(5-15)


 


Estimat Glomerular Filtration


Rate 


  


  


  15 ML/MIN


(>89)


 


Protein Corrected Calcium


  


  


  


  7.7 MG/DL


(8.5-10.1)


 


Phosphorus Level


  


  


  


  8.0 MG/DL


(2.5-4.9)


 


Test


  3/30/18


06:30 3/30/18


09:15 3/30/18


11:30 


 


 


Blood Gas Puncture Site LT RADIAL    


 


Blood Gas Patient Temperature 37.0    


 


Blood Gas HCO3


  19 mmol/L


(22-26) 


  


  


 


 


Blood Gas Base Excess


  -6.1 mmol/L


(-2-2) 


  


  


 


 


Blood Gas Oxygen Saturation 96 % ()    


 


Arterial Blood pH


  7.30


(7.380-7.420) 


  


  


 


 


Arterial Blood Partial


Pressure CO2 40 mmHg


(38-42) 


  


  


 


 


Arterial Blood Partial


Pressure O2 133 mmHg


() 


  


  


 


 


Arterial Blood Oxygen Content


  11.4 Vol %


(12.0-20.0) 


  


  


 


 


Arterial Blood


Carboxyhemoglobin 1.3 % (0-4) 


  


  


  


 


 


Arterial Blood Methemoglobin 1.1 % (0-2)    


 


Blood Gas Hemoglobin


  8.2 G/DL


(12.0-16.0) 


  


  


 


 


Oxygen Delivery Device VENTILATOR    


 


Blood Gas Ventilator Setting PRVC/AC    


 


Blood Gas Inspired Oxygen 40 %    


 


Activated Partial


Thromboplast Time 


  


  32.0 SEC


(24.3-30.1) 


 








Result Diagram:  


3/30/18 0410                                                                   

             3/30/18 0410





Microbiology





Microbiology








 Date/Time


Source Procedure


Growth Status


 


 


 3/30/18 11:30


Blood Peripheral Aerobic Blood Culture


Pending Received


 


 3/30/18 11:30


Blood Peripheral Anaerobic Blood Culture


Pending Received





 3/30/18 11:15


Blood Peripheral Aerobic Blood Culture


Pending Received


 


 3/30/18 11:15


Blood Peripheral Anaerobic Blood Culture


Pending Received








Imaging





Last Impressions








Chest X-Ray 3/30/18 0000 Signed





Impressions: 





 Service Date/Time:  2018 07:52 - CONCLUSION:  Probable 

slight 





 interval increase in effusions     Jorge Santos MD 


 


Chest CT 3/29/18 0000 Signed





Impressions: 





 Service Date/Time:   11:55 - CONCLUSION:  There has 

been 





 overall improvement with increased aeration of both upper lung fields and 





 improving resolution of the previously noted scattered interstitial and 

airspace 





 infiltrates especially in both upper lung fields. There continues to be 





 infiltrates in both lung bases with some compressive atelectasis. Small 





 bilateral effusions.      Hermelindo Rodriguez MD 


 


Renal Ultrasound 3/28/18 0000 Signed





Impressions: 





 Service Date/Time:  2018 08:50 - CONCLUSION:  1. No 





 evidence of hydronephrosis. 2. Increased echogenicity of the renal parenchyma 





 bilaterally suggestive of chronic medical renal disease.     Hermelindo Rodriguez MD 


 


Head CT 3/26/18 0919 Signed





Impressions: 





 Service Date/Time:  2018 13:25 - CONCLUSION: Negative 





 noncontrast CT.     Rob Marshall MD 


 


Liver Ultrasound 3/26/18 0000 Signed





Impressions: 





 Service Date/Time:  2018 16:24 - CONCLUSION:  1. Minimal 

free 





 fluid in the abdomen 2. Mildly distended gallbladder without evidence of 





 cholelithiasis. 3. No evidence of biliary obstructive disease or focal liver 





 abnormality.     Lencho Montes MD 








Procedures


* 3/26 intubation/mechanical ventilation.  Intubation by EMS at nursing home.


* 3/26 right radial arterial line placement


* 3/26 left subclavian triple-lumen catheter placement


.





Assessment and Plan


Disease Oriented Problem List:  


(1) Cardiac arrest


(2) Respiratory failure


(3) Shock liver


(4) COPD with exacerbation


(5) Severe sepsis


(6) Acute kidney injury


(7) Atrial fibrillation with RVR


(8) Pulmonary edema


(9) CHF (congestive heart failure)


(10) Pneumonia


(11) Hypoalbuminemia


Symptom Scale:  


(1) Pain


0-10 Scale:  Unable to quantify


Comment:  Unclear if patient had prehospitalization pain syndromes.  Current 

sources of pain might include recent chest compressions; orotracheal and 

orogastric intubations; Haynes catheter; venous access catheters; restraints; 

prolonged bedbound status.


.





(2) Dyspnea


0-10 Scale:  Unable to quantify


Comment:  Dyspnea probably secondary to a combination of underlying COPD; 

tachycardia; and congestive heart failure.  Dyspnea currently managed on the 

ventilator.


.





(3) Encephalopathy


0-10 Scale:  Unable to quantify





Pertinent Non-Medical Issues


Psychosocial: He was born in [facility] and moved to [facility] as a young child where he spent most of his life.  He was in the [employer] and 

served in the [place] War.  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  

He hung drywall for a living.  He has 1 daughter, Edilia Jeffers who lives in 

[place] and one son, from whom he is estranged who changed his name to Reid Ryan.  He was 1 of 5 children.  2 sisters are  and he has 1 sister, 

Sue who is his healthcare surrogate and one brother in [place].  Patient was 

a resident of [facility].  Sister has been located and produced 

advanced directive paperwork naming her as healthcare surrogate.





Spiritual: Not an important concept to the patient per the sister.





Legal: Healthcare surrogate and living will on chart.





Ethical issues impacting care: Patient is incapacitated.  It appears unlikely 

at this time that he will regain capacity to make his own healthcare decisions.

  His sister is willing to serve as his healthcare surrogate.


.


Important Contacts


[facility] 





Katelynn Womack (sister) [phone] healthcare surrogate


.


Prognosis


Patient has multiple underlying comorbidities.  He came in in respiratory 

distress and suffered a asystolic cardiac arrest.  He is now going into shock 

liver.  Kidney function is declining.  Prognosis is poor at this time.  It is 

uncertain if patient will survive this hospitalization.  If he does survive the 

hospitalization it is unclear what type of quality of life he will return to.  

Prognostication would also be helped if I was clear about his functional status 

prior to this event.


.


Code Status:  Full Code


Plan


==  Code Status: FULL CODE  --sister wishes him to remain a full code until she 

has discussed this with the patient's daughter.





== Decision Making: Patient is incapacitated to make his own healthcare 

decisions.  At this point it is very unlikely that he will recover capacity to 

do so.  His sister Katelynn is his healthcare surrogate





== Goals medical treatment: Sister states that patient stated he would undergo 

1 round of CPR but then would not want it extended and would want to be allowed 

to pass naturally.  The CODE STATUS has not been changed pending discussion 

with the patient's daughter.





== Symptoms:


* Pain: Unclear if patient had prehospitalization pain syndromes.  He does not 

"yes".  Fentanyl added for both pain and sedation.  Current sources of pain 

might include recent chest compressions; orotracheal and orogastric intubations

; Haynes catheter; venous access catheters; restraints; prolonged bedbound 

status.





* Dyspnea:   probably secondary to a combination of underlying COPD; atrial 

fibrillation and congestive heart failure.  Dyspnea currently managed on the 

ventilator.  No additional recommendations at this time.





* Encephalopathy:  Probably metabolic and multi-factorial .  This will probably 

improve if we can correct some of the underlying metabolic issues.  He is 

currently receiving sodium bicarbonate 150 mEq via IV drip.  Patient was able 

to respond to questions during sedation vacation.  No further recommendations 

at this time.





* Anxiety: Per nursing home and the patient's sister, he has a severe anxiety 

disorder and has been chronically on benzodiazepines.  He is a heavy alcohol 

user of 2-3 6 packs per day, but has not been drinking during the last 2 weeks 

as he has been in a nursing home.  He is receiving Valium 5 mg every 8 hours 

and no further tremors are seen at this evaluation.











== Palliative care will continue to follow to assist with symptom management 

and to further clarify goals of medical treatment as the clinical course 

evolves.


.





Attestation


To help prompt me to consider important information that might be impacting 

today's encounter and assessment, information from prior notes written by 

myself or my colleagues may have been "brought forward" into today's note.  My 

signature on this note, however, is an attestation that I personally performed 

the exam, history, and/or decision-making noted today, and, unless otherwise 

indicated, the interactions with patient, family, and staff as well as the 

review of records all occurred today.  I also attest that the listed assessment 

and stated plan reflect my best clinical judgment today based on the 

combination of historical information, prior notes, and today's exam/ 

interactions.  When time spent is documented, it refers only to time spent 

today by the signer, or if indicated, combined time spent today by 

collaborating physician/nurse practitioner.


.











Sarah Farmer Mar 30, 2018 14:17

## 2018-03-30 NOTE — RADRPT
EXAM DATE/TIME:  03/30/2018 07:52 

 

[facility] COMPARISON:     

CT THORAX W/O CONTRAST, March 29, 2018, 11:55.  CHEST SINGLE AP, March 26, 2018, 20:45.

 

                     

INDICATIONS :     

Pneumonia.

                     

 

MEDICAL HISTORY :     

Chronic obstructive pulmonary disease.  Cardiovascular disease.  Diabetes mellitus type II.   Deep ve
nous thrombosis.Pulmonary embolism.Hypertension   

 

SURGICAL HISTORY :     

None.   

 

ENCOUNTER:     

Subsequent                                        

 

ACUITY:     

4 - 6 days      

 

PAIN SCORE:     

Non-responsive.

 

LOCATION:     

Bilateral chest 

 

FINDINGS:     

Endotracheal tube is stable in good position a couple centimeters above the savana. Nasogastric tube 
coils in the stomach. A left subclavian central line is stable with tip overlying SVC. Patchy bilater
al infiltrates and increasing effusions noted. Cardiac contours are grossly stable. There is severe a
rthritic change noted in the shoulders.

 

CONCLUSION:     

Probable slight interval increase in effusions

 

 

 

 Jorge Santos MD on March 30, 2018 at 8:20           

Board Certified Radiologist.

 This report was verified electronically.

## 2018-03-31 VITALS
HEART RATE: 90 BPM | SYSTOLIC BLOOD PRESSURE: 164 MMHG | DIASTOLIC BLOOD PRESSURE: 96 MMHG | OXYGEN SATURATION: 100 % | RESPIRATION RATE: 20 BRPM

## 2018-03-31 VITALS
RESPIRATION RATE: 19 BRPM | SYSTOLIC BLOOD PRESSURE: 144 MMHG | HEART RATE: 82 BPM | OXYGEN SATURATION: 100 % | DIASTOLIC BLOOD PRESSURE: 91 MMHG

## 2018-03-31 VITALS
DIASTOLIC BLOOD PRESSURE: 75 MMHG | RESPIRATION RATE: 19 BRPM | HEART RATE: 84 BPM | OXYGEN SATURATION: 100 % | SYSTOLIC BLOOD PRESSURE: 120 MMHG

## 2018-03-31 VITALS — RESPIRATION RATE: 16 BRPM | OXYGEN SATURATION: 100 % | TEMPERATURE: 97.9 F | HEART RATE: 82 BPM

## 2018-03-31 VITALS
SYSTOLIC BLOOD PRESSURE: 129 MMHG | OXYGEN SATURATION: 100 % | HEART RATE: 90 BPM | RESPIRATION RATE: 20 BRPM | DIASTOLIC BLOOD PRESSURE: 87 MMHG

## 2018-03-31 VITALS
RESPIRATION RATE: 20 BRPM | SYSTOLIC BLOOD PRESSURE: 136 MMHG | DIASTOLIC BLOOD PRESSURE: 81 MMHG | HEART RATE: 81 BPM | OXYGEN SATURATION: 100 % | TEMPERATURE: 97.8 F

## 2018-03-31 VITALS
DIASTOLIC BLOOD PRESSURE: 96 MMHG | SYSTOLIC BLOOD PRESSURE: 161 MMHG | OXYGEN SATURATION: 100 % | RESPIRATION RATE: 19 BRPM | HEART RATE: 88 BPM

## 2018-03-31 VITALS — OXYGEN SATURATION: 100 % | HEART RATE: 86 BPM | RESPIRATION RATE: 19 BRPM

## 2018-03-31 VITALS
HEART RATE: 80 BPM | DIASTOLIC BLOOD PRESSURE: 79 MMHG | TEMPERATURE: 97.3 F | RESPIRATION RATE: 20 BRPM | OXYGEN SATURATION: 100 % | SYSTOLIC BLOOD PRESSURE: 126 MMHG

## 2018-03-31 VITALS
HEART RATE: 88 BPM | TEMPERATURE: 98.7 F | RESPIRATION RATE: 20 BRPM | OXYGEN SATURATION: 100 % | DIASTOLIC BLOOD PRESSURE: 71 MMHG | SYSTOLIC BLOOD PRESSURE: 131 MMHG

## 2018-03-31 VITALS
DIASTOLIC BLOOD PRESSURE: 77 MMHG | HEART RATE: 84 BPM | SYSTOLIC BLOOD PRESSURE: 127 MMHG | RESPIRATION RATE: 19 BRPM | OXYGEN SATURATION: 100 %

## 2018-03-31 VITALS
SYSTOLIC BLOOD PRESSURE: 126 MMHG | OXYGEN SATURATION: 100 % | TEMPERATURE: 97.6 F | RESPIRATION RATE: 19 BRPM | DIASTOLIC BLOOD PRESSURE: 85 MMHG | HEART RATE: 74 BPM

## 2018-03-31 VITALS — OXYGEN SATURATION: 97 %

## 2018-03-31 VITALS — HEART RATE: 100 BPM

## 2018-03-31 VITALS — OXYGEN SATURATION: 100 %

## 2018-03-31 VITALS
OXYGEN SATURATION: 100 % | SYSTOLIC BLOOD PRESSURE: 112 MMHG | RESPIRATION RATE: 19 BRPM | HEART RATE: 82 BPM | DIASTOLIC BLOOD PRESSURE: 74 MMHG

## 2018-03-31 VITALS
SYSTOLIC BLOOD PRESSURE: 141 MMHG | HEART RATE: 81 BPM | TEMPERATURE: 97.8 F | DIASTOLIC BLOOD PRESSURE: 93 MMHG | RESPIRATION RATE: 20 BRPM | OXYGEN SATURATION: 100 %

## 2018-03-31 VITALS
OXYGEN SATURATION: 99 % | HEART RATE: 81 BPM | SYSTOLIC BLOOD PRESSURE: 136 MMHG | DIASTOLIC BLOOD PRESSURE: 90 MMHG | TEMPERATURE: 97.7 F | RESPIRATION RATE: 20 BRPM

## 2018-03-31 VITALS
HEART RATE: 82 BPM | SYSTOLIC BLOOD PRESSURE: 137 MMHG | OXYGEN SATURATION: 100 % | RESPIRATION RATE: 20 BRPM | TEMPERATURE: 97.6 F | DIASTOLIC BLOOD PRESSURE: 81 MMHG

## 2018-03-31 VITALS
HEART RATE: 84 BPM | DIASTOLIC BLOOD PRESSURE: 59 MMHG | SYSTOLIC BLOOD PRESSURE: 107 MMHG | RESPIRATION RATE: 20 BRPM | OXYGEN SATURATION: 100 %

## 2018-03-31 VITALS
OXYGEN SATURATION: 100 % | DIASTOLIC BLOOD PRESSURE: 77 MMHG | RESPIRATION RATE: 19 BRPM | HEART RATE: 84 BPM | SYSTOLIC BLOOD PRESSURE: 129 MMHG

## 2018-03-31 VITALS
HEART RATE: 88 BPM | DIASTOLIC BLOOD PRESSURE: 96 MMHG | RESPIRATION RATE: 20 BRPM | SYSTOLIC BLOOD PRESSURE: 147 MMHG | OXYGEN SATURATION: 100 %

## 2018-03-31 VITALS
DIASTOLIC BLOOD PRESSURE: 84 MMHG | RESPIRATION RATE: 21 BRPM | OXYGEN SATURATION: 100 % | HEART RATE: 84 BPM | SYSTOLIC BLOOD PRESSURE: 138 MMHG

## 2018-03-31 VITALS
SYSTOLIC BLOOD PRESSURE: 136 MMHG | OXYGEN SATURATION: 100 % | HEART RATE: 88 BPM | RESPIRATION RATE: 20 BRPM | DIASTOLIC BLOOD PRESSURE: 84 MMHG

## 2018-03-31 VITALS
RESPIRATION RATE: 16 BRPM | OXYGEN SATURATION: 100 % | SYSTOLIC BLOOD PRESSURE: 117 MMHG | HEART RATE: 104 BPM | DIASTOLIC BLOOD PRESSURE: 90 MMHG

## 2018-03-31 VITALS
SYSTOLIC BLOOD PRESSURE: 131 MMHG | OXYGEN SATURATION: 100 % | RESPIRATION RATE: 13 BRPM | HEART RATE: 88 BPM | DIASTOLIC BLOOD PRESSURE: 80 MMHG

## 2018-03-31 VITALS
HEART RATE: 82 BPM | SYSTOLIC BLOOD PRESSURE: 136 MMHG | RESPIRATION RATE: 20 BRPM | DIASTOLIC BLOOD PRESSURE: 78 MMHG | OXYGEN SATURATION: 100 %

## 2018-03-31 VITALS
SYSTOLIC BLOOD PRESSURE: 134 MMHG | DIASTOLIC BLOOD PRESSURE: 86 MMHG | RESPIRATION RATE: 20 BRPM | HEART RATE: 76 BPM | OXYGEN SATURATION: 100 %

## 2018-03-31 VITALS
RESPIRATION RATE: 20 BRPM | OXYGEN SATURATION: 100 % | SYSTOLIC BLOOD PRESSURE: 160 MMHG | HEART RATE: 86 BPM | DIASTOLIC BLOOD PRESSURE: 104 MMHG

## 2018-03-31 VITALS
RESPIRATION RATE: 20 BRPM | DIASTOLIC BLOOD PRESSURE: 76 MMHG | OXYGEN SATURATION: 100 % | SYSTOLIC BLOOD PRESSURE: 120 MMHG | HEART RATE: 88 BPM

## 2018-03-31 VITALS — HEART RATE: 77 BPM

## 2018-03-31 VITALS
RESPIRATION RATE: 19 BRPM | DIASTOLIC BLOOD PRESSURE: 103 MMHG | OXYGEN SATURATION: 100 % | HEART RATE: 82 BPM | SYSTOLIC BLOOD PRESSURE: 146 MMHG

## 2018-03-31 VITALS
TEMPERATURE: 98.5 F | HEART RATE: 90 BPM | SYSTOLIC BLOOD PRESSURE: 164 MMHG | DIASTOLIC BLOOD PRESSURE: 90 MMHG | OXYGEN SATURATION: 100 % | RESPIRATION RATE: 20 BRPM

## 2018-03-31 VITALS — HEART RATE: 96 BPM

## 2018-03-31 LAB
ALBUMIN SERPL-MCNC: 2.9 GM/DL (ref 3.4–5)
ALP SERPL-CCNC: 146 U/L (ref 45–117)
ALT SERPL-CCNC: 1342 U/L (ref 12–78)
AST SERPL-CCNC: 71 U/L (ref 15–37)
BILIRUB SERPL-MCNC: 1.7 MG/DL (ref 0.2–1)
BUN SERPL-MCNC: 103 MG/DL (ref 7–18)
CALCIUM SERPL-MCNC: 6.6 MG/DL (ref 8.5–10.1)
CALCIUM TP COR SERPL-MCNC: 7.3 MG/DL (ref 8.5–10.1)
CHLORIDE SERPL-SCNC: 95 MEQ/L (ref 98–107)
CREAT SERPL-MCNC: 4.6 MG/DL (ref 0.6–1.3)
CREAT UR-MCNC: 14 MG/DL
ERYTHROCYTE [DISTWIDTH] IN BLOOD BY AUTOMATED COUNT: 22 % (ref 11.6–17.2)
GFR SERPLBLD BASED ON 1.73 SQ M-ARVRAT: 13 ML/MIN (ref 89–?)
GLUCOSE SERPL-MCNC: 127 MG/DL (ref 74–106)
HCO3 BLD-SCNC: 26.2 MEQ/L (ref 21–32)
HCT VFR BLD CALC: 22.9 % (ref 39–51)
HCT VFR BLD CALC: 25.3 % (ref 39–51)
HGB BLD-MCNC: 7.1 GM/DL (ref 13–17)
HGB BLD-MCNC: 8.1 GM/DL (ref 13–17)
INR PPP: 1.1 RATIO
MCH RBC QN AUTO: 22.5 PG (ref 27–34)
MCHC RBC AUTO-ENTMCNC: 30.8 % (ref 32–36)
MCV RBC AUTO: 72.9 FL (ref 80–100)
PLATELET # BLD: 41 TH/MM3 (ref 150–450)
PMV BLD AUTO: 8.7 FL (ref 7–11)
PROT SERPL-MCNC: 5.7 GM/DL (ref 6.4–8.2)
PROTHROMBIN TIME: 11.3 SEC (ref 9.8–11.6)
RBC # BLD AUTO: 3.14 MIL/MM3 (ref 4.5–5.9)
SODIUM SERPL-SCNC: 134 MEQ/L (ref 136–145)
UREA UR-MCNC: 332 MG/DL
WBC # BLD AUTO: 7.8 TH/MM3 (ref 4–11)

## 2018-03-31 PROCEDURE — 30233N1 TRANSFUSION OF NONAUTOLOGOUS RED BLOOD CELLS INTO PERIPHERAL VEIN, PERCUTANEOUS APPROACH: ICD-10-PCS | Performed by: INTERNAL MEDICINE

## 2018-03-31 RX ADMIN — ALBUMIN HUMAN SCH MLS/HR: 0.25 SOLUTION INTRAVENOUS at 00:50

## 2018-03-31 RX ADMIN — ALBUMIN HUMAN SCH MLS/HR: 0.25 SOLUTION INTRAVENOUS at 06:45

## 2018-03-31 RX ADMIN — STANDARDIZED SENNA CONCENTRATE AND DOCUSATE SODIUM SCH TAB: 8.6; 5 TABLET, FILM COATED ORAL at 08:51

## 2018-03-31 RX ADMIN — AMIODARONE HYDROCHLORIDE SCH MG: 200 TABLET ORAL at 03:34

## 2018-03-31 RX ADMIN — HUMAN INSULIN SCH: 100 INJECTION, SOLUTION SUBCUTANEOUS at 19:28

## 2018-03-31 RX ADMIN — CALCIUM ACETATE SCH MG: 667 CAPSULE ORAL at 08:50

## 2018-03-31 RX ADMIN — FUROSEMIDE SCH MG: 10 INJECTION, SOLUTION INTRAMUSCULAR; INTRAVENOUS at 00:50

## 2018-03-31 RX ADMIN — IPRATROPIUM BROMIDE AND ALBUTEROL SULFATE SCH AMPULE: .5; 3 SOLUTION RESPIRATORY (INHALATION) at 04:07

## 2018-03-31 RX ADMIN — HUMAN INSULIN SCH: 100 INJECTION, SOLUTION SUBCUTANEOUS at 05:53

## 2018-03-31 RX ADMIN — Medication PRN MLS/HR: at 00:50

## 2018-03-31 RX ADMIN — PROPOFOL PRN MLS/HR: 10 INJECTION, EMULSION INTRAVENOUS at 03:55

## 2018-03-31 RX ADMIN — METOLAZONE SCH MG: 5 TABLET ORAL at 19:29

## 2018-03-31 RX ADMIN — WATER SCH ML: 1 IRRIGANT IRRIGATION at 08:50

## 2018-03-31 RX ADMIN — STANDARDIZED SENNA CONCENTRATE AND DOCUSATE SODIUM SCH TAB: 8.6; 5 TABLET, FILM COATED ORAL at 19:29

## 2018-03-31 RX ADMIN — MUPIROCIN CALCIUM SCH APPLIC: 20 OINTMENT TOPICAL at 08:50

## 2018-03-31 RX ADMIN — IPRATROPIUM BROMIDE AND ALBUTEROL SULFATE SCH AMPULE: .5; 3 SOLUTION RESPIRATORY (INHALATION) at 09:05

## 2018-03-31 RX ADMIN — TAZOBACTAM SODIUM AND PIPERACILLIN SODIUM SCH MLS/HR: 250; 2 INJECTION, SOLUTION INTRAVENOUS at 03:34

## 2018-03-31 RX ADMIN — FAMOTIDINE SCH MG: 20 TABLET, FILM COATED ORAL at 08:51

## 2018-03-31 RX ADMIN — IPRATROPIUM BROMIDE AND ALBUTEROL SULFATE SCH AMPULE: .5; 3 SOLUTION RESPIRATORY (INHALATION) at 17:09

## 2018-03-31 RX ADMIN — FUROSEMIDE SCH MG: 10 INJECTION, SOLUTION INTRAMUSCULAR; INTRAVENOUS at 17:50

## 2018-03-31 RX ADMIN — AZITHROMYCIN SCH MLS/HR: 500 INJECTION, POWDER, LYOPHILIZED, FOR SOLUTION INTRAVENOUS at 09:00

## 2018-03-31 RX ADMIN — HUMAN INSULIN SCH: 100 INJECTION, SOLUTION SUBCUTANEOUS at 16:15

## 2018-03-31 RX ADMIN — IPRATROPIUM BROMIDE AND ALBUTEROL SULFATE SCH AMPULE: .5; 3 SOLUTION RESPIRATORY (INHALATION) at 21:18

## 2018-03-31 RX ADMIN — DIAZEPAM SCH MG: 10 TABLET ORAL at 19:29

## 2018-03-31 RX ADMIN — METHYLPREDNISOLONE SODIUM SUCCINATE SCH MG: 40 INJECTION, POWDER, FOR SOLUTION INTRAMUSCULAR; INTRAVENOUS at 05:54

## 2018-03-31 RX ADMIN — AMIODARONE HYDROCHLORIDE SCH MG: 200 TABLET ORAL at 14:31

## 2018-03-31 RX ADMIN — FUROSEMIDE SCH MG: 10 INJECTION, SOLUTION INTRAMUSCULAR; INTRAVENOUS at 12:07

## 2018-03-31 RX ADMIN — HUMAN INSULIN SCH: 100 INJECTION, SOLUTION SUBCUTANEOUS at 08:15

## 2018-03-31 RX ADMIN — HUMAN INSULIN SCH: 100 INJECTION, SOLUTION SUBCUTANEOUS at 11:54

## 2018-03-31 RX ADMIN — PROPOFOL PRN MLS/HR: 10 INJECTION, EMULSION INTRAVENOUS at 19:21

## 2018-03-31 RX ADMIN — CHLORHEXIDINE GLUCONATE SCH PACK: 500 CLOTH TOPICAL at 03:34

## 2018-03-31 RX ADMIN — DIAZEPAM SCH MG: 10 TABLET ORAL at 05:54

## 2018-03-31 RX ADMIN — VANCOMYCIN HYDROCHLORIDE SCH MLS/HR: 1 INJECTION, SOLUTION INTRAVENOUS at 01:53

## 2018-03-31 RX ADMIN — ALBUMIN HUMAN SCH MLS/HR: 0.25 SOLUTION INTRAVENOUS at 17:50

## 2018-03-31 RX ADMIN — CALCIUM ACETATE SCH MG: 667 CAPSULE ORAL at 12:04

## 2018-03-31 RX ADMIN — Medication PRN MLS/HR: at 12:11

## 2018-03-31 RX ADMIN — FUROSEMIDE SCH MG: 10 INJECTION, SOLUTION INTRAMUSCULAR; INTRAVENOUS at 06:40

## 2018-03-31 RX ADMIN — METOLAZONE SCH MG: 5 TABLET ORAL at 08:50

## 2018-03-31 RX ADMIN — MUPIROCIN CALCIUM SCH APPLIC: 20 OINTMENT TOPICAL at 19:28

## 2018-03-31 RX ADMIN — CHLORHEXIDINE GLUCONATE 0.12% ORAL RINSE SCH ML: 1.2 LIQUID ORAL at 08:54

## 2018-03-31 RX ADMIN — HEPARIN SODIUM SCH MLS/HR: 10000 INJECTION, SOLUTION INTRAVENOUS; SUBCUTANEOUS at 06:40

## 2018-03-31 RX ADMIN — HUMAN INSULIN SCH: 100 INJECTION, SOLUTION SUBCUTANEOUS at 01:04

## 2018-03-31 RX ADMIN — CHLORHEXIDINE GLUCONATE 0.12% ORAL RINSE SCH ML: 1.2 LIQUID ORAL at 19:28

## 2018-03-31 RX ADMIN — DIAZEPAM SCH MG: 10 TABLET ORAL at 14:31

## 2018-03-31 RX ADMIN — PREDNISONE SCH MG: 5 SOLUTION ORAL at 10:05

## 2018-03-31 RX ADMIN — ACYCLOVIR SCH TAB: 800 TABLET ORAL at 08:51

## 2018-03-31 RX ADMIN — CALCIUM ACETATE SCH MG: 667 CAPSULE ORAL at 17:50

## 2018-03-31 RX ADMIN — ALBUMIN HUMAN SCH MLS/HR: 0.25 SOLUTION INTRAVENOUS at 12:04

## 2018-03-31 RX ADMIN — FAMOTIDINE SCH MG: 20 TABLET, FILM COATED ORAL at 19:29

## 2018-03-31 NOTE — HHI.CCPN
Subjective


Remarks/Hospital Course


3/27: overnight, started to become purposeful in upper extremities, and pupils 

are 3mm and now reactive. remains in shock with shock liver, acute kidney injury

, multiorgan failure. LFTs uptrending.





3/28: improvement in mental status. now following commands. discussion with 

sister: patient drinks heavily: 2-3 6-packs/day. liver enzymes still elevated, 

remains oliguric with Cr > 3 now and rising. albumin continues to fall which is 

likely combination of very poor nutritional status and poor liver synthetic 

function. blood sugar slightly improved. overall still in multiorgan failure. 

family appears to have poor insight into patient's condition: sister herself is 

going through rehab for a recent severe illness. now also starting tremors 

which may be early alcohol withdraw given new clinical history to suggest large 

etoh intake.





3/29: improvements in hemodynamics and off vasopressors. wbc downtrending. but 

no improvement and worsening in metabolic organ failure: Cr higher, remains 

oliguric. LFTs still high. remains on d10w and tube feeds with marginal blood 

glucose. acidosis continues to worsen despite adequate cardiac index.





3/30: kidney function continues to worsen, though uop slightly improved from 

yesterday. remains off vasopressors. afib RVR yesterday now controlled with 

amiodarone. overall no meaningful improvements- acidosis still too severe to 

allow extubation and renal function complicating acidosis. very poor dialysis 

candidate given overall functionality and current acute illness.





3/31: renal function worse, however now non-oliguric. off vasopressors. staph 

epi growing 2 different sensitivity profiles, so most likely contaminated 

sample. acidosis slightly better.





Objective





Vital Signs








  Date Time  Temp Pulse Resp B/P (MAP) Pulse Ox O2 Delivery O2 Flow Rate FiO2


 


3/31/18 06:25  77      


 


3/31/18 06:01   20 129/87 (101) 100   


 


3/31/18 04:30 97.9       


 


3/31/18 04:06        35














Intake and Output   


 


 3/31/18 3/31/18 4/1/18





 08:00 16:00 00:00


 


Output Total 1800 ml  


 


Balance -1800 ml  








Result Diagram:  


3/30/18 0410                                                                   

             3/30/18 1500





Objective Remarks


gen: elderly male, lying in bed, intubated, sedated


heent: pupils 3mm, equal, reactive. mucous membranes moist. nc. at.


neck: no jvd. trachea midline.


chest: equal chest rise. coarse breath sounds. left subclavian cvl in place, 

site c/d/i.


cv: normal rate, irregularly irregular rhythm. afib.


abd: soft, nontender, nondistended. no guarding.


extr: distal pulses 2+. no peripheral edema. right radial art line in place, 

site c/d/i.


neuro: RASS -2. GCS 10 ([id number]). +cough. + gag. + corneals. follows commands.





A/P


Assessment and Plan


Assessment: 67yM who presented in extremis and progressed rapidly to PEA arrest

, most likely secondary to septic shock. Blood with Coag negative staph in 4/4 

bottles. Unclear source of infection as sputum and urine cultures have not 

produced organism. He remains in multiorgan failure and his liver and kidneys 

are still severely dysfunctional without signs of improvement. remains 

critically ill. After long discussion with his sister and medical decisionmaker

, plan to make him DNR with continued aggressive care.  Acidosis and renal 

failure are biggest barriers today. will consult nephrology, although he would 

be an exceedingly poor renal replacement candidate given his pre-hospital poor 

functional status, his critical illness, and his multi organ dysfunction.  

Aggressive goals remain and he continues to be critically ill and off pathway.





Plan by systems:





Neurologic:


Hypoxic-ischemic encephalopathy


Metabolic encephalopathy - improving


Alcohol Dependence


Alcohol withdraw syndrome


Frequent neuro checks


Propofol for goal RASS -2


iv thiamine, mvi


valium 5mg po q8h


clonidine 0.2mg po q8h.





Respiratory:


Acute hypoxic and hypercarbic respiratory failure - persistent.


Vent bundle


Head of bed at 30


Nebs


start daily SBTs


Wean FiO2 for goal SPO2 greater than 90%


CT chest 3/29: resolving consolidation without significant effusion or empyema.





Cardiovascular:


Septic shock - resolving.


Status post PEA arrest


Atrial fibrillation with rapid ventricular response


continue amiodarone 200mg po q12h.


clonidine 0.2mg po q8h








Renal:


Acute kidney injury-worsening


Secondary septic shock


Continue Ivy jones


renal ultrasound 3/28: no evidence of hydronephrosis.


-- Strict I/Os


nephrology consult.


again, very poor dialysis candidate.


very volume overloaded. diuresis to lasix 100mg iv q6h and give with albumin 

25gm q6h.





FEN/GI:


Acute protein calorie malnutrition -severe


Shock liver


Hyperkalemia


tube feeds


Trend LFTs


nutrition consult





Heme/ID:


Septic shock


Leukocytosis


hold vancomycin


d/c azithro and zosyn.


check vanco level.


urine and sputum cultures NG


Blood cultures, coag negative staph 4/4 bottles, but different sensitivities: 

likely contaminant.


urinary legionella and pneumococcal Ag: negative.


wbc and fever curve trending down. 





Endocrine:


Hypoglycemia- improving.


Secondary to shock liver


d/c d10w


-- SSI





Prophylaxis:


GI Prophylaxis


IV Pepcid





DVT Prophylaxis


-- SCDs


heparin drip





Lines:


3/26 left subclavian triple-lumen catheter


3/26 right radial arterial line


Haynes





Dispo:


Remain in ICU.  Remains critically ill.





This patient remains critically ill with one or more organ systems which are or 

may become a threat to life.  I have spent in excess of 30 minutes 

discontinuously in the care and management of this patient.  This time is 

exclusive of procedures, and includes, but is not limited to, evaluation of the 

patient, review of the medical record, discussions with family, consultants, 

nursing staff, or respiratory therapy, and documentation in the medical record.











Earle Palacio MD Mar 31, 2018 06:50

## 2018-03-31 NOTE — HHI.NPPN
Subjective


History of Present Illness


67-year-old male with a past medical history of chronic 


obstructive pulmonary disease, history of deep venous thrombosis with 


pulmonary embolism, atrial fibrillation, hypertension, ischemic heart 


disease, congestive heart failure, who was admitted from the nursing 


home with generalized weakness and altered mental status.


I was called to see the patient because of elevated BUN and 


creatinine.


Additional Remarks


Patient remain on the vent, and unresponsive.





Review of Systems


General


General Remarks


Intubated and unresponsive.





Objective Data


Data











 3/31/18 4/1/18





 19:00 07:00


 


Intake Total 1652 ml 


 


Output Total 1850 ml 


 


Balance -198 ml 


 


  


 


IV Total 817 ml 


 


Tube Feeding 385 ml 


 


Packed Cells 400 ml 


 


Blood Product IV Normal Saline Flush 50 ml 


 


Output Urine Total 1850 ml 


 


# Bowel Movements 0 











Vital Signs








  Date Time  Temp Pulse Resp B/P (MAP) Pulse Ox O2 Delivery O2 Flow Rate FiO2


 


3/31/18 19:40     100   35


 


3/31/18 18:00  84      


 


3/31/18 18:00  88 20 136/84 (101) 100   


 


3/31/18 17:10     100   35


 


3/31/18 17:00  88 20 147/96 (113) 100   


 


3/31/18 16:00  81      


 


3/31/18 16:00        35


 


3/31/18 16:00 97.8 84 20 141/93 (109) 100   


 


3/31/18 15:00  84 19 129/77 (94) 100   


 


3/31/18 14:00  84      


 


3/31/18 14:00  88 19 120/75 (90) 100   


 


3/31/18 13:45     100   35


 


3/31/18 13:17 97.7 81 20 136/90 99   


 


3/31/18 13:00  76 20 134/86 (102) 100   


 


3/31/18 12:00  74      


 


3/31/18 12:00 97.6 74 19 126/85 (99) 100   


 


3/31/18 12:00        35


 


3/31/18 11:47 97.8 81 20 136/81 100   


 


3/31/18 11:32 97.3 80 20 126/79 100   


 


3/31/18 11:00  82 19 112/74 (87) 100   


 


3/31/18 10:30     100   35


 


3/31/18 10:00  84      


 


3/31/18 10:00  84 20 107/59 (75) 100   


 


3/31/18 09:00  82 19 144/91 (108) 100   


 


3/31/18 08:00  82 20 136/78 (97) 100   


 


3/31/18 08:00        35


 


3/31/18 08:00  82      


 


3/31/18 07:55     97   35


 


3/31/18 07:00 97.6 82 20 137/81 (99) 100   


 


3/31/18 06:25  77      


 


3/31/18 06:01  90 20 129/87 (101) 100   


 


3/31/18 05:30  86 19  100   


 


3/31/18 05:01  84 21 138/84 (102) 100   


 


3/31/18 04:30 97.9 82 16  100   


 


3/31/18 04:06     100   35


 


3/31/18 04:01  88 13 131/80 (97) 100   


 


3/31/18 04:00        35


 


3/31/18 04:00  100      


 


3/31/18 03:01  84 19 127/77 (94) 100   


 


3/31/18 02:03  100      


 


3/31/18 02:01  88 20 120/76 (91) 100   


 


3/31/18 01:01  104 16 117/90 (99) 100   


 


3/31/18 00:37     100   35


 


3/31/18 00:01 98.7 88 20 131/71 (91) 100   


 


3/31/18 00:00  96      


 


3/31/18 00:00        35


 


3/30/18 23:01  96 19 129/80 (96) 100   


 


3/30/18 22:33     100   35








-:  


3/31/18 1613                                                                   

             3/31/18 0700








Physical Exam


General


Appearance Remarks


Intubated and unresponsive.





Eyes


Eye Exam:  Pupils Equal





Pulmonary


Resp Exam:  Breath Sounds Equal, No Distress, Rhonchi, Decreased Bases, 

Diminished Breath Sounds





Gastrointestinal/Abdomen


GI Exam:  Soft, Non-Tender, Bowel Sounds Present, Distended





Extremeties


Extremities Exam:  Trace Edema





Neurologic


Neuro Exam:  Unresponsive





Assessment/Plan


Assessment Summary:  CLARE/Acute Renal Failure


Problem List:  


(1) COPD with exacerbation


ICD Codes:  J44.1 - Chronic obstructive pulmonary disease with (acute) 

exacerbation


(2) Shock liver


ICD Codes:  K72.00 - Acute and subacute hepatic failure without coma


(3) Hypoalbuminemia


ICD Codes:  E88.09 - Other disorders of plasma-protein metabolism, not 

elsewhere classified


(4) Encephalopathy


ICD Codes:  G93.40 - Encephalopathy, unspecified


(5) CHF (congestive heart failure)


ICD Codes:  I50.9 - Heart failure, unspecified


(6) Cardiac arrest


ICD Codes:  I46.9 - Cardiac arrest, cause unspecified


(7) Acute kidney injury


ICD Codes:  N17.9 - Acute kidney failure, unspecified


Plan





Patient has PEA and develop CLARE.


Most likely has ATN.


Now started passing the urine,


On Lasix, BUN and Creatinine continue to increase.


No urgent need for HD.


K is normal and Hco3 is not low.


If any need for HD, will discuss with the family.


Possibly has Encephalopathy related to Cardiac arrest.











Marita Riojas MD Mar 31, 2018 22:34

## 2018-04-01 VITALS
RESPIRATION RATE: 20 BRPM | DIASTOLIC BLOOD PRESSURE: 84 MMHG | HEART RATE: 84 BPM | OXYGEN SATURATION: 100 % | SYSTOLIC BLOOD PRESSURE: 135 MMHG

## 2018-04-01 VITALS
DIASTOLIC BLOOD PRESSURE: 100 MMHG | SYSTOLIC BLOOD PRESSURE: 153 MMHG | OXYGEN SATURATION: 96 % | HEART RATE: 114 BPM | RESPIRATION RATE: 27 BRPM

## 2018-04-01 VITALS
DIASTOLIC BLOOD PRESSURE: 114 MMHG | OXYGEN SATURATION: 96 % | HEART RATE: 166 BPM | RESPIRATION RATE: 22 BRPM | SYSTOLIC BLOOD PRESSURE: 201 MMHG

## 2018-04-01 VITALS
RESPIRATION RATE: 29 BRPM | SYSTOLIC BLOOD PRESSURE: 98 MMHG | HEART RATE: 104 BPM | DIASTOLIC BLOOD PRESSURE: 61 MMHG | OXYGEN SATURATION: 100 %

## 2018-04-01 VITALS
RESPIRATION RATE: 17 BRPM | OXYGEN SATURATION: 98 % | DIASTOLIC BLOOD PRESSURE: 130 MMHG | HEART RATE: 154 BPM | SYSTOLIC BLOOD PRESSURE: 195 MMHG

## 2018-04-01 VITALS
HEART RATE: 86 BPM | SYSTOLIC BLOOD PRESSURE: 135 MMHG | RESPIRATION RATE: 20 BRPM | DIASTOLIC BLOOD PRESSURE: 89 MMHG | OXYGEN SATURATION: 100 %

## 2018-04-01 VITALS
DIASTOLIC BLOOD PRESSURE: 92 MMHG | RESPIRATION RATE: 19 BRPM | HEART RATE: 88 BPM | OXYGEN SATURATION: 100 % | SYSTOLIC BLOOD PRESSURE: 150 MMHG

## 2018-04-01 VITALS
SYSTOLIC BLOOD PRESSURE: 145 MMHG | HEART RATE: 82 BPM | OXYGEN SATURATION: 100 % | RESPIRATION RATE: 20 BRPM | DIASTOLIC BLOOD PRESSURE: 100 MMHG

## 2018-04-01 VITALS
HEART RATE: 86 BPM | SYSTOLIC BLOOD PRESSURE: 152 MMHG | RESPIRATION RATE: 20 BRPM | DIASTOLIC BLOOD PRESSURE: 89 MMHG | OXYGEN SATURATION: 100 %

## 2018-04-01 VITALS
RESPIRATION RATE: 20 BRPM | SYSTOLIC BLOOD PRESSURE: 147 MMHG | HEART RATE: 88 BPM | TEMPERATURE: 98.1 F | DIASTOLIC BLOOD PRESSURE: 92 MMHG | OXYGEN SATURATION: 100 %

## 2018-04-01 VITALS
RESPIRATION RATE: 20 BRPM | DIASTOLIC BLOOD PRESSURE: 90 MMHG | HEART RATE: 84 BPM | OXYGEN SATURATION: 100 % | SYSTOLIC BLOOD PRESSURE: 149 MMHG

## 2018-04-01 VITALS
RESPIRATION RATE: 19 BRPM | SYSTOLIC BLOOD PRESSURE: 147 MMHG | OXYGEN SATURATION: 98 % | DIASTOLIC BLOOD PRESSURE: 84 MMHG | HEART RATE: 92 BPM

## 2018-04-01 VITALS — OXYGEN SATURATION: 98 %

## 2018-04-01 VITALS
HEART RATE: 88 BPM | RESPIRATION RATE: 20 BRPM | DIASTOLIC BLOOD PRESSURE: 83 MMHG | SYSTOLIC BLOOD PRESSURE: 145 MMHG | OXYGEN SATURATION: 100 %

## 2018-04-01 VITALS — HEART RATE: 84 BPM | OXYGEN SATURATION: 99 % | RESPIRATION RATE: 19 BRPM

## 2018-04-01 VITALS
HEART RATE: 156 BPM | OXYGEN SATURATION: 99 % | RESPIRATION RATE: 20 BRPM | SYSTOLIC BLOOD PRESSURE: 189 MMHG | DIASTOLIC BLOOD PRESSURE: 143 MMHG

## 2018-04-01 VITALS
HEART RATE: 90 BPM | DIASTOLIC BLOOD PRESSURE: 100 MMHG | RESPIRATION RATE: 19 BRPM | OXYGEN SATURATION: 100 % | SYSTOLIC BLOOD PRESSURE: 168 MMHG

## 2018-04-01 VITALS
DIASTOLIC BLOOD PRESSURE: 93 MMHG | OXYGEN SATURATION: 100 % | RESPIRATION RATE: 20 BRPM | HEART RATE: 84 BPM | SYSTOLIC BLOOD PRESSURE: 159 MMHG

## 2018-04-01 VITALS
RESPIRATION RATE: 20 BRPM | HEART RATE: 88 BPM | DIASTOLIC BLOOD PRESSURE: 90 MMHG | SYSTOLIC BLOOD PRESSURE: 156 MMHG | TEMPERATURE: 97.9 F | OXYGEN SATURATION: 100 %

## 2018-04-01 VITALS
HEART RATE: 110 BPM | DIASTOLIC BLOOD PRESSURE: 84 MMHG | OXYGEN SATURATION: 96 % | SYSTOLIC BLOOD PRESSURE: 124 MMHG | RESPIRATION RATE: 28 BRPM

## 2018-04-01 VITALS — OXYGEN SATURATION: 100 %

## 2018-04-01 VITALS
RESPIRATION RATE: 20 BRPM | OXYGEN SATURATION: 100 % | TEMPERATURE: 97.5 F | HEART RATE: 86 BPM | SYSTOLIC BLOOD PRESSURE: 149 MMHG | DIASTOLIC BLOOD PRESSURE: 94 MMHG

## 2018-04-01 VITALS
RESPIRATION RATE: 20 BRPM | OXYGEN SATURATION: 100 % | DIASTOLIC BLOOD PRESSURE: 91 MMHG | HEART RATE: 88 BPM | SYSTOLIC BLOOD PRESSURE: 162 MMHG

## 2018-04-01 VITALS — OXYGEN SATURATION: 97 %

## 2018-04-01 VITALS
DIASTOLIC BLOOD PRESSURE: 93 MMHG | TEMPERATURE: 98.9 F | OXYGEN SATURATION: 92 % | SYSTOLIC BLOOD PRESSURE: 149 MMHG | HEART RATE: 118 BPM | RESPIRATION RATE: 26 BRPM

## 2018-04-01 VITALS
OXYGEN SATURATION: 100 % | RESPIRATION RATE: 20 BRPM | DIASTOLIC BLOOD PRESSURE: 92 MMHG | HEART RATE: 86 BPM | SYSTOLIC BLOOD PRESSURE: 149 MMHG | TEMPERATURE: 97.6 F

## 2018-04-01 VITALS
HEART RATE: 88 BPM | OXYGEN SATURATION: 100 % | SYSTOLIC BLOOD PRESSURE: 148 MMHG | RESPIRATION RATE: 19 BRPM | DIASTOLIC BLOOD PRESSURE: 87 MMHG

## 2018-04-01 VITALS
DIASTOLIC BLOOD PRESSURE: 75 MMHG | SYSTOLIC BLOOD PRESSURE: 138 MMHG | HEART RATE: 94 BPM | RESPIRATION RATE: 20 BRPM | OXYGEN SATURATION: 97 %

## 2018-04-01 VITALS
SYSTOLIC BLOOD PRESSURE: 165 MMHG | TEMPERATURE: 97.5 F | HEART RATE: 110 BPM | OXYGEN SATURATION: 97 % | DIASTOLIC BLOOD PRESSURE: 88 MMHG | RESPIRATION RATE: 23 BRPM

## 2018-04-01 VITALS
HEART RATE: 88 BPM | OXYGEN SATURATION: 100 % | DIASTOLIC BLOOD PRESSURE: 83 MMHG | RESPIRATION RATE: 20 BRPM | SYSTOLIC BLOOD PRESSURE: 156 MMHG

## 2018-04-01 VITALS
HEART RATE: 118 BPM | RESPIRATION RATE: 27 BRPM | DIASTOLIC BLOOD PRESSURE: 87 MMHG | OXYGEN SATURATION: 97 % | SYSTOLIC BLOOD PRESSURE: 133 MMHG

## 2018-04-01 VITALS
HEART RATE: 150 BPM | SYSTOLIC BLOOD PRESSURE: 168 MMHG | OXYGEN SATURATION: 100 % | DIASTOLIC BLOOD PRESSURE: 118 MMHG | RESPIRATION RATE: 17 BRPM

## 2018-04-01 VITALS
SYSTOLIC BLOOD PRESSURE: 83 MMHG | OXYGEN SATURATION: 96 % | RESPIRATION RATE: 21 BRPM | HEART RATE: 100 BPM | DIASTOLIC BLOOD PRESSURE: 73 MMHG

## 2018-04-01 VITALS — HEART RATE: 166 BPM

## 2018-04-01 VITALS — OXYGEN SATURATION: 100 % | RESPIRATION RATE: 19 BRPM | HEART RATE: 87 BPM

## 2018-04-01 VITALS
OXYGEN SATURATION: 87 % | SYSTOLIC BLOOD PRESSURE: 133 MMHG | DIASTOLIC BLOOD PRESSURE: 86 MMHG | RESPIRATION RATE: 22 BRPM | HEART RATE: 106 BPM

## 2018-04-01 VITALS — RESPIRATION RATE: 20 BRPM | HEART RATE: 88 BPM | OXYGEN SATURATION: 100 %

## 2018-04-01 VITALS — HEART RATE: 96 BPM

## 2018-04-01 VITALS — OXYGEN SATURATION: 93 %

## 2018-04-01 VITALS — HEART RATE: 118 BPM

## 2018-04-01 VITALS — HEART RATE: 84 BPM

## 2018-04-01 LAB
ALBUMIN SERPL-MCNC: 3.9 GM/DL (ref 3.4–5)
ALP SERPL-CCNC: 119 U/L (ref 45–117)
ALT SERPL-CCNC: 822 U/L (ref 12–78)
AST SERPL-CCNC: 33 U/L (ref 15–37)
BILIRUB SERPL-MCNC: 2.2 MG/DL (ref 0.2–1)
BUN SERPL-MCNC: 118 MG/DL (ref 7–18)
CALCIUM SERPL-MCNC: 7.8 MG/DL (ref 8.5–10.1)
CHLORIDE SERPL-SCNC: 93 MEQ/L (ref 98–107)
CREAT SERPL-MCNC: 5 MG/DL (ref 0.6–1.3)
ERYTHROCYTE [DISTWIDTH] IN BLOOD BY AUTOMATED COUNT: 22 % (ref 11.6–17.2)
GFR SERPLBLD BASED ON 1.73 SQ M-ARVRAT: 12 ML/MIN (ref 89–?)
GLUCOSE SERPL-MCNC: 225 MG/DL (ref 74–106)
HCO3 BLD-SCNC: 24.6 MEQ/L (ref 21–32)
HCT VFR BLD CALC: 24.8 % (ref 39–51)
HGB BLD-MCNC: 8.2 GM/DL (ref 13–17)
INR PPP: 1.1 RATIO
MCH RBC QN AUTO: 24.4 PG (ref 27–34)
MCHC RBC AUTO-ENTMCNC: 33 % (ref 32–36)
MCV RBC AUTO: 74.1 FL (ref 80–100)
PLATELET # BLD: 35 TH/MM3 (ref 150–450)
PMV BLD AUTO: 9 FL (ref 7–11)
PROT SERPL-MCNC: 6.6 GM/DL (ref 6.4–8.2)
PROTHROMBIN TIME: 11.3 SEC (ref 9.8–11.6)
RBC # BLD AUTO: 3.35 MIL/MM3 (ref 4.5–5.9)
SODIUM SERPL-SCNC: 134 MEQ/L (ref 136–145)
WBC # BLD AUTO: 8.1 TH/MM3 (ref 4–11)

## 2018-04-01 RX ADMIN — IPRATROPIUM BROMIDE AND ALBUTEROL SULFATE SCH AMPULE: .5; 3 SOLUTION RESPIRATORY (INHALATION) at 15:22

## 2018-04-01 RX ADMIN — DIAZEPAM SCH MG: 10 TABLET ORAL at 05:06

## 2018-04-01 RX ADMIN — STANDARDIZED SENNA CONCENTRATE AND DOCUSATE SODIUM SCH TAB: 8.6; 5 TABLET, FILM COATED ORAL at 09:54

## 2018-04-01 RX ADMIN — DIAZEPAM SCH MG: 10 TABLET ORAL at 13:04

## 2018-04-01 RX ADMIN — ALBUMIN HUMAN SCH MLS/HR: 0.25 SOLUTION INTRAVENOUS at 12:59

## 2018-04-01 RX ADMIN — ALBUMIN HUMAN SCH MLS/HR: 0.25 SOLUTION INTRAVENOUS at 18:17

## 2018-04-01 RX ADMIN — FAMOTIDINE SCH MG: 20 TABLET, FILM COATED ORAL at 09:54

## 2018-04-01 RX ADMIN — MUPIROCIN CALCIUM SCH APPLIC: 20 OINTMENT TOPICAL at 09:52

## 2018-04-01 RX ADMIN — MUPIROCIN CALCIUM SCH APPLIC: 20 OINTMENT TOPICAL at 19:54

## 2018-04-01 RX ADMIN — FUROSEMIDE SCH MG: 10 INJECTION, SOLUTION INTRAMUSCULAR; INTRAVENOUS at 00:03

## 2018-04-01 RX ADMIN — HUMAN INSULIN SCH: 100 INJECTION, SOLUTION SUBCUTANEOUS at 19:55

## 2018-04-01 RX ADMIN — PROPOFOL PRN MLS/HR: 10 INJECTION, EMULSION INTRAVENOUS at 12:05

## 2018-04-01 RX ADMIN — METOLAZONE SCH MG: 5 TABLET ORAL at 19:55

## 2018-04-01 RX ADMIN — ALBUMIN HUMAN SCH MLS/HR: 0.25 SOLUTION INTRAVENOUS at 00:03

## 2018-04-01 RX ADMIN — IPRATROPIUM BROMIDE AND ALBUTEROL SULFATE SCH AMPULE: .5; 3 SOLUTION RESPIRATORY (INHALATION) at 20:57

## 2018-04-01 RX ADMIN — CALCIUM ACETATE SCH MG: 667 CAPSULE ORAL at 13:05

## 2018-04-01 RX ADMIN — FUROSEMIDE SCH MG: 10 INJECTION, SOLUTION INTRAMUSCULAR; INTRAVENOUS at 05:06

## 2018-04-01 RX ADMIN — HUMAN INSULIN SCH: 100 INJECTION, SOLUTION SUBCUTANEOUS at 00:03

## 2018-04-01 RX ADMIN — METOLAZONE SCH MG: 5 TABLET ORAL at 09:53

## 2018-04-01 RX ADMIN — AMIODARONE HYDROCHLORIDE SCH MG: 200 TABLET ORAL at 01:54

## 2018-04-01 RX ADMIN — AZITHROMYCIN SCH MLS/HR: 500 INJECTION, POWDER, LYOPHILIZED, FOR SOLUTION INTRAVENOUS at 09:52

## 2018-04-01 RX ADMIN — HUMAN INSULIN SCH: 100 INJECTION, SOLUTION SUBCUTANEOUS at 09:51

## 2018-04-01 RX ADMIN — CHLORHEXIDINE GLUCONATE 0.12% ORAL RINSE SCH ML: 1.2 LIQUID ORAL at 19:57

## 2018-04-01 RX ADMIN — AMIODARONE HYDROCHLORIDE SCH MG: 200 TABLET ORAL at 13:05

## 2018-04-01 RX ADMIN — Medication PRN MLS/HR: at 03:45

## 2018-04-01 RX ADMIN — IPRATROPIUM BROMIDE AND ALBUTEROL SULFATE SCH AMPULE: .5; 3 SOLUTION RESPIRATORY (INHALATION) at 10:23

## 2018-04-01 RX ADMIN — PROPOFOL PRN MLS/HR: 10 INJECTION, EMULSION INTRAVENOUS at 06:23

## 2018-04-01 RX ADMIN — STANDARDIZED SENNA CONCENTRATE AND DOCUSATE SODIUM SCH TAB: 8.6; 5 TABLET, FILM COATED ORAL at 19:55

## 2018-04-01 RX ADMIN — FAMOTIDINE SCH MG: 20 TABLET, FILM COATED ORAL at 19:55

## 2018-04-01 RX ADMIN — HUMAN INSULIN SCH: 100 INJECTION, SOLUTION SUBCUTANEOUS at 18:17

## 2018-04-01 RX ADMIN — Medication PRN ML: at 09:54

## 2018-04-01 RX ADMIN — ALBUMIN HUMAN SCH MLS/HR: 0.25 SOLUTION INTRAVENOUS at 05:05

## 2018-04-01 RX ADMIN — WATER SCH ML: 1 IRRIGANT IRRIGATION at 09:52

## 2018-04-01 RX ADMIN — Medication SCH MG: at 09:53

## 2018-04-01 RX ADMIN — IPRATROPIUM BROMIDE AND ALBUTEROL SULFATE SCH AMPULE: .5; 3 SOLUTION RESPIRATORY (INHALATION) at 03:00

## 2018-04-01 RX ADMIN — CHLORHEXIDINE GLUCONATE SCH PACK: 500 CLOTH TOPICAL at 03:53

## 2018-04-01 RX ADMIN — CALCIUM ACETATE SCH MG: 667 CAPSULE ORAL at 17:43

## 2018-04-01 RX ADMIN — FUROSEMIDE SCH MG: 10 INJECTION, SOLUTION INTRAMUSCULAR; INTRAVENOUS at 19:54

## 2018-04-01 RX ADMIN — DEXMEDETOMIDINE HYDROCHLORIDE PRN MLS/HR: 100 INJECTION, SOLUTION, CONCENTRATE INTRAVENOUS at 22:57

## 2018-04-01 RX ADMIN — HUMAN INSULIN SCH: 100 INJECTION, SOLUTION SUBCUTANEOUS at 12:15

## 2018-04-01 RX ADMIN — CHLORHEXIDINE GLUCONATE 0.12% ORAL RINSE SCH ML: 1.2 LIQUID ORAL at 09:50

## 2018-04-01 RX ADMIN — HUMAN INSULIN SCH: 100 INJECTION, SOLUTION SUBCUTANEOUS at 03:53

## 2018-04-01 RX ADMIN — CALCIUM ACETATE SCH MG: 667 CAPSULE ORAL at 09:53

## 2018-04-01 RX ADMIN — PREDNISONE SCH MG: 5 SOLUTION ORAL at 10:02

## 2018-04-01 RX ADMIN — DIAZEPAM SCH MG: 10 TABLET ORAL at 19:55

## 2018-04-01 RX ADMIN — ACYCLOVIR SCH TAB: 800 TABLET ORAL at 09:54

## 2018-04-01 NOTE — HHI.NPPN
Subjective


History of Present Illness


67-year-old male with a past medical history of chronic 


obstructive pulmonary disease, history of deep venous thrombosis with 


pulmonary embolism, atrial fibrillation, hypertension, ischemic heart 


disease, congestive heart failure, who was admitted from the nursing 


home with generalized weakness and altered mental status.


I was called to see the patient because of elevated BUN and 


creatinine.


Additional Remarks


Patient remain on the vent, and sedated, BP is stable.





Review of Systems


General


General Remarks


Intubated and unresponsive.





Objective Data


Data





Vital Signs








  Date Time  Temp Pulse Resp B/P (MAP) Pulse Ox O2 Delivery O2 Flow Rate FiO2


 


4/1/18 11:00  88      


 


4/1/18 10:25     100   35


 


4/1/18 10:00  84      


 


4/1/18 09:00  84      


 


4/1/18 09:00  84 20 159/93 (115) 100   


 


4/1/18 08:00 97.5 86 20 149/94 (112) 100   


 


4/1/18 08:00  86      


 


4/1/18 07:32     100   35


 


4/1/18 07:00  88 20 162/91 (114) 100   


 


4/1/18 06:00  88      


 


4/1/18 06:00  88 20 145/83 (103) 100   


 


4/1/18 05:00  90 19 168/100 (122) 100   


 


4/1/18 04:13     100   35


 


4/1/18 04:00 97.9 88 20 156/90 (112) 100   


 


4/1/18 04:00        35


 


4/1/18 04:00  88      


 


4/1/18 03:00  88 20 156/83 (107) 100   


 


4/1/18 02:00  88 19 150/92 (111) 100   


 


4/1/18 02:00  88      


 


4/1/18 01:00     100   35


 


4/1/18 01:00  86 20 152/89 (110) 100   


 


4/1/18 00:00 98.1 88 20 147/92 (110) 100   


 


4/1/18 00:00  88      


 


4/1/18 00:00        35


 


3/31/18 23:00  90 20 164/96 (118) 100   


 


3/31/18 22:40     100   35


 


3/31/18 22:00  88      


 


3/31/18 22:00  88 19 161/96 (117) 100   


 


3/31/18 21:00  86 20 160/104 (122) 100   


 


3/31/18 20:00 98.5 90 20 164/90 (114) 100   


 


3/31/18 20:00  90      


 


3/31/18 20:00        35


 


3/31/18 19:40     100   35


 


3/31/18 19:00  82 19 146/103 (117) 100   


 


3/31/18 18:00  84      


 


3/31/18 18:00  88 20 136/84 (101) 100   


 


3/31/18 17:10     100   35


 


3/31/18 17:00  88 20 147/96 (113) 100   


 


3/31/18 16:00  81      


 


3/31/18 16:00        35


 


3/31/18 16:00 97.8 84 20 141/93 (109) 100   


 


3/31/18 15:00  84 19 129/77 (94) 100   


 


3/31/18 14:00  84      


 


3/31/18 14:00  88 19 120/75 (90) 100   


 


3/31/18 13:45     100   35


 


3/31/18 13:17 97.7 81 20 136/90 99   


 


3/31/18 13:00  76 20 134/86 (102) 100   


 


3/31/18 12:00  74      


 


3/31/18 12:00 97.6 74 19 126/85 (99) 100   


 


3/31/18 12:00        35


 


3/31/18 11:47 97.8 81 20 136/81 100   








-:  


4/1/18 0331                                                                    

            4/1/18 0331








Physical Exam


General


Appearance Remarks


Intubated and unresponsive, sedated.





Eyes


Eye Exam:  Pupils Equal





Pulmonary


Resp Exam:  Breath Sounds Equal, No Distress, Rhonchi, Decreased Bases, 

Diminished Breath Sounds





Gastrointestinal/Abdomen


GI Exam:  Soft, Non-Tender, Bowel Sounds Present, Distended





Extremeties


Extremities Exam:  Trace Edema





Neurologic


Neuro Exam:  Unresponsive





Assessment/Plan


Assessment Summary:  CLARE/Acute Renal Failure


Problem List:  


(1) COPD with exacerbation


ICD Codes:  J44.1 - Chronic obstructive pulmonary disease with (acute) 

exacerbation


(2) Shock liver


ICD Codes:  K72.00 - Acute and subacute hepatic failure without coma


(3) Hypoalbuminemia


ICD Codes:  E88.09 - Other disorders of plasma-protein metabolism, not 

elsewhere classified


(4) Encephalopathy


ICD Codes:  G93.40 - Encephalopathy, unspecified


(5) CHF (congestive heart failure)


ICD Codes:  I50.9 - Heart failure, unspecified


(6) Cardiac arrest


ICD Codes:  I46.9 - Cardiac arrest, cause unspecified


(7) Acute kidney injury


ICD Codes:  N17.9 - Acute kidney failure, unspecified


Plan





Patient has PEA and develop CLARE.


Most likely has ATN.


Now started passing the urine,


On Lasix and Metolazone.


Urine out put is good.


BUN and Creatinine continue to increase.


No urgent need for HD.


K is normal and Hco3 is not low.


Possibly has Encephalopathy related to Cardiac arrest.


Decrease Lasix, as urine out put has increased.


Avoid Nephrotoxins and follow the urine out put and BMP.











Marita Riojas MD Apr 1, 2018 11:45

## 2018-04-01 NOTE — HHI.CCPN
Subjective


Remarks/Hospital Course


3/27: overnight, started to become purposeful in upper extremities, and pupils 

are 3mm and now reactive. remains in shock with shock liver, acute kidney injury

, multiorgan failure. LFTs uptrending.





3/28: improvement in mental status. now following commands. discussion with 

sister: patient drinks heavily: 2-3 6-packs/day. liver enzymes still elevated, 

remains oliguric with Cr > 3 now and rising. albumin continues to fall which is 

likely combination of very poor nutritional status and poor liver synthetic 

function. blood sugar slightly improved. overall still in multiorgan failure. 

family appears to have poor insight into patient's condition: sister herself is 

going through rehab for a recent severe illness. now also starting tremors 

which may be early alcohol withdraw given new clinical history to suggest large 

etoh intake.





3/29: improvements in hemodynamics and off vasopressors. wbc downtrending. but 

no improvement and worsening in metabolic organ failure: Cr higher, remains 

oliguric. LFTs still high. remains on d10w and tube feeds with marginal blood 

glucose. acidosis continues to worsen despite adequate cardiac index.





3/30: kidney function continues to worsen, though uop slightly improved from 

yesterday. remains off vasopressors. afib RVR yesterday now controlled with 

amiodarone. overall no meaningful improvements- acidosis still too severe to 

allow extubation and renal function complicating acidosis. very poor dialysis 

candidate given overall functionality and current acute illness.





3/31: renal function worse, however now non-oliguric. off vasopressors. staph 

epi growing 2 different sensitivity profiles, so most likely contaminated 

sample. acidosis slightly better. 





4/1: Patient remains intubated critical.  Currently off sedation remains 

encephalopathic and very weak.  Very weakly followed commands by squeezing hand 

on the left hand, wiggled toes right foot.  Worsening renal failure  

creatinine 5 today.  Remains nonoliguric approximately 4 L urine output.  

Unless there is significant neurology clinical improvement he is not a 

candidate for hemodialysis





Objective





Vital Signs








  Date Time  Temp Pulse Resp B/P (MAP) Pulse Ox O2 Delivery O2 Flow Rate FiO2


 


4/1/18 15:20  154 17 195/130 (151) 98   


 


4/1/18 13:27        35


 


4/1/18 13:01 97.6       














Intake and Output   


 


 4/1/18 4/1/18 4/2/18





 08:00 16:00 00:00


 


Intake Total 1012.5 ml 93 ml 


 


Output Total 2125 ml  


 


Balance -1112.5 ml 93 ml 








Result Diagram:  


4/1/18 0331 4/1/18 0331





Objective Remarks


gen: 67-year-old  male, lying in bed, intubated, off sedation, 

tachypneic


heent: pupils 3mm, equal, reactive. mucous membranes moist. nc. at.


neck: no jvd. trachea midline.


chest: equal chest rise. coarse breath sounds, scattered coarse rhonchi. left 

subclavian cvl in place, site c/d/i.


cv: Hypertensive, irregularly irregular rhythm. afib.


abd: soft, nontender, nondistended. no guarding.


extr: distal pulses 2+. no peripheral edema. right radial art line in place, 

site c/d/i.


neuro: Eyes open spontaneously, patient is tachypneic.  Very weakly squeezed 

with left hand and wiggle right toes on command. +cough. + gag. + corneals.





A/P


Assessment and Plan


Assessment: 67yM who presented in extremis and progressed rapidly to PEA arrest

, most likely secondary to septic shock, and COPD exacerbation blood with Coag 

negative staph in 4/4 bottles. Unclear source of infection as sputum and urine 

cultures have not produced organism. He remains in multiorgan failure and his 

liver and kidneys are still severely dysfunctional without signs of 

improvement. remains critically ill. After long discussion with his sister and 

medical decisionmaker, plan to make him DNR with continued aggressive care.  

Acidosis and renal failure are biggest barriers today. will consult nephrology, 

although he would be an exceedingly poor renal replacement candidate given his 

pre-hospital poor functional status, his critical illness, and his multi organ 

dysfunction.  Aggressive goals remain and he continues to be critically ill and 

off pathway.





Plan by systems:





Neurologic:


Hypoxic-ischemic encephalopathy


Metabolic encephalopathy 


Alcohol Dependence


Alcohol withdrawal syndrome


Frequent neuro checks


Discontinue propofol and fentanyl


Start Precedex to facilitate ventilator weaning and neuro exam


iv thiamine, mvi


valium 5mg po q8h


clonidine 0.2mg po q8h.


Watch closely for alcohol withdrawal





Respiratory:


Acute hypoxic and hypercarbic respiratory failure - persistent.


Vent bundle


Head of bed at 30


Nebs


Daily SBTs-mental status will not permit extubation


Wean FiO2 for goal SPO2 greater than 90%


CT chest 3/29: resolving consolidation without significant effusion or empyema.





Cardiovascular:


Septic shock - resolved.


Status post PEA arrest


Atrial fibrillation with rapid ventricular response


continue amiodarone 200mg po q12h.


clonidine 0.2mg po q8h


Continue IV Lasix as below





Renal:


Acute kidney injury-worsening


Secondary septic shock, ATN


Continue Haynes


renal ultrasound 3/28: no evidence of hydronephrosis.


-- Strict I/Os


nephrology consult. Dr Riojas following


Very poor dialysis candidate, unless there is significant improvement in 

mentation


very volume overloaded.  Currently diuresis with lasix 100mg iv q6h and give 

with albumin 25gm q6h.


Reduced to 100 mg q12 by Dr. Riojas, as the BUN creat continue to rise. On 

metolazone 10 mg q12


Defer for dialysis decision to Dr. Riojas. Poor candidate secondary to poor 

overall prognosis





FEN/GI:


Acute protein calorie malnutrition -severe


Shock liver


Hyperkalemia


tube feeds


Trend LFTs


nutrition consult





Heme/ID:


Septic shock


Leukocytosis


holding vancomycin.  Consult pharmacy to dose


d/c azithro and zosyn.


check vanco level.


urine and sputum cultures NG


Blood cultures, coag negative staph 4/4 bottles, but different sensitivities: 

likely contaminant.  Repeat cultures negative today


urinary legionella and pneumococcal Ag: negative.


wbc and fever curve trending down. 





Endocrine:


Hypoglycemia- improving.


Secondary to shock liver


d/c d10w


-- SSI





Prophylaxis:


GI Prophylaxis


IV Pepcid





DVT Prophylaxis


-- SCDs


heparin drip





Lines:


3/26 left subclavian triple-lumen catheter


3/26 right radial arterial line


[place]





Dispo:


Remain in ICU.  Remains critically ill.





This patient remains critically ill with one or more organ systems which are or 

may become a threat to life.  I have spent in excess of 30 minutes 

discontinuously in the care and management of this patient.  This time is 

exclusive of procedures, and includes, but is not limited to, evaluation of the 

patient, review of the medical record, discussions with family, consultants, 

nursing staff, or respiratory therapy, and documentation in the medical record.











Mary Ellen Patel MD Apr 1, 2018 16:42

## 2018-04-01 NOTE — RADRPT
EXAM DATE/TIME:  04/01/2018 14:50 

 

[facility] COMPARISON:     

CHEST SINGLE AP, March 30, 2018, 7:52.

 

                     

INDICATIONS :     

Respiratory disease. 

                     

 

MEDICAL HISTORY :            

Chronic obstructive pulmonary disease. Cardiovascular disease. Diabetes mellitus type II. Deep venous
 thrombosis.Pulmonary embolism.Hypertension.    

 

SURGICAL HISTORY :     

None.   

 

ENCOUNTER:     

Subsequent                                        

 

ACUITY:     

1 week      

 

PAIN SCORE:     

Non-responsive.

 

LOCATION:     

Bilateral chest 

 

FINDINGS:     

Supporting devices are stable in position.

The lungs continue to demonstrate diffuse interstitial prominence and consolidating airspace disease 
within the left base. Small bilateral effusions remain evident.

 

CONCLUSION:     No significant change.

 

 

 

 Lencho Montes MD on April 01, 2018 at 15:41           

Board Certified Radiologist.

 This report was verified electronically.

## 2018-04-02 VITALS
SYSTOLIC BLOOD PRESSURE: 158 MMHG | HEART RATE: 112 BPM | DIASTOLIC BLOOD PRESSURE: 90 MMHG | RESPIRATION RATE: 20 BRPM | OXYGEN SATURATION: 99 %

## 2018-04-02 VITALS
HEART RATE: 108 BPM | OXYGEN SATURATION: 99 % | RESPIRATION RATE: 24 BRPM | SYSTOLIC BLOOD PRESSURE: 157 MMHG | DIASTOLIC BLOOD PRESSURE: 88 MMHG

## 2018-04-02 VITALS — OXYGEN SATURATION: 93 %

## 2018-04-02 VITALS — HEART RATE: 126 BPM

## 2018-04-02 VITALS
DIASTOLIC BLOOD PRESSURE: 118 MMHG | OXYGEN SATURATION: 94 % | RESPIRATION RATE: 30 BRPM | SYSTOLIC BLOOD PRESSURE: 203 MMHG | HEART RATE: 126 BPM

## 2018-04-02 VITALS
OXYGEN SATURATION: 96 % | RESPIRATION RATE: 29 BRPM | SYSTOLIC BLOOD PRESSURE: 163 MMHG | HEART RATE: 110 BPM | DIASTOLIC BLOOD PRESSURE: 86 MMHG

## 2018-04-02 VITALS
DIASTOLIC BLOOD PRESSURE: 103 MMHG | HEART RATE: 122 BPM | SYSTOLIC BLOOD PRESSURE: 199 MMHG | RESPIRATION RATE: 30 BRPM | OXYGEN SATURATION: 96 %

## 2018-04-02 VITALS
DIASTOLIC BLOOD PRESSURE: 104 MMHG | OXYGEN SATURATION: 98 % | RESPIRATION RATE: 27 BRPM | SYSTOLIC BLOOD PRESSURE: 155 MMHG | HEART RATE: 132 BPM

## 2018-04-02 VITALS
DIASTOLIC BLOOD PRESSURE: 102 MMHG | OXYGEN SATURATION: 94 % | SYSTOLIC BLOOD PRESSURE: 192 MMHG | RESPIRATION RATE: 29 BRPM | HEART RATE: 132 BPM

## 2018-04-02 VITALS
RESPIRATION RATE: 31 BRPM | DIASTOLIC BLOOD PRESSURE: 78 MMHG | SYSTOLIC BLOOD PRESSURE: 158 MMHG | OXYGEN SATURATION: 94 % | HEART RATE: 122 BPM

## 2018-04-02 VITALS — HEART RATE: 122 BPM

## 2018-04-02 VITALS
RESPIRATION RATE: 26 BRPM | SYSTOLIC BLOOD PRESSURE: 106 MMHG | OXYGEN SATURATION: 99 % | HEART RATE: 102 BPM | DIASTOLIC BLOOD PRESSURE: 61 MMHG

## 2018-04-02 VITALS
SYSTOLIC BLOOD PRESSURE: 185 MMHG | HEART RATE: 116 BPM | OXYGEN SATURATION: 92 % | RESPIRATION RATE: 24 BRPM | DIASTOLIC BLOOD PRESSURE: 103 MMHG

## 2018-04-02 VITALS
SYSTOLIC BLOOD PRESSURE: 107 MMHG | DIASTOLIC BLOOD PRESSURE: 57 MMHG | HEART RATE: 96 BPM | RESPIRATION RATE: 24 BRPM | OXYGEN SATURATION: 99 %

## 2018-04-02 VITALS
SYSTOLIC BLOOD PRESSURE: 174 MMHG | OXYGEN SATURATION: 95 % | HEART RATE: 126 BPM | DIASTOLIC BLOOD PRESSURE: 77 MMHG | RESPIRATION RATE: 31 BRPM

## 2018-04-02 VITALS
SYSTOLIC BLOOD PRESSURE: 140 MMHG | OXYGEN SATURATION: 97 % | RESPIRATION RATE: 30 BRPM | HEART RATE: 100 BPM | DIASTOLIC BLOOD PRESSURE: 68 MMHG

## 2018-04-02 VITALS — OXYGEN SATURATION: 96 %

## 2018-04-02 VITALS
DIASTOLIC BLOOD PRESSURE: 87 MMHG | OXYGEN SATURATION: 97 % | HEART RATE: 114 BPM | RESPIRATION RATE: 22 BRPM | SYSTOLIC BLOOD PRESSURE: 149 MMHG

## 2018-04-02 VITALS
DIASTOLIC BLOOD PRESSURE: 93 MMHG | HEART RATE: 116 BPM | RESPIRATION RATE: 21 BRPM | SYSTOLIC BLOOD PRESSURE: 159 MMHG | OXYGEN SATURATION: 97 %

## 2018-04-02 VITALS
SYSTOLIC BLOOD PRESSURE: 159 MMHG | TEMPERATURE: 98.4 F | HEART RATE: 110 BPM | DIASTOLIC BLOOD PRESSURE: 98 MMHG | OXYGEN SATURATION: 99 % | RESPIRATION RATE: 20 BRPM

## 2018-04-02 VITALS
DIASTOLIC BLOOD PRESSURE: 99 MMHG | SYSTOLIC BLOOD PRESSURE: 143 MMHG | TEMPERATURE: 97.8 F | RESPIRATION RATE: 29 BRPM | OXYGEN SATURATION: 98 % | HEART RATE: 114 BPM

## 2018-04-02 VITALS
OXYGEN SATURATION: 95 % | RESPIRATION RATE: 29 BRPM | HEART RATE: 134 BPM | SYSTOLIC BLOOD PRESSURE: 171 MMHG | DIASTOLIC BLOOD PRESSURE: 102 MMHG

## 2018-04-02 VITALS — HEART RATE: 118 BPM

## 2018-04-02 VITALS — HEART RATE: 128 BPM

## 2018-04-02 VITALS
OXYGEN SATURATION: 95 % | RESPIRATION RATE: 31 BRPM | DIASTOLIC BLOOD PRESSURE: 112 MMHG | SYSTOLIC BLOOD PRESSURE: 201 MMHG | HEART RATE: 120 BPM | TEMPERATURE: 98.5 F

## 2018-04-02 VITALS
OXYGEN SATURATION: 96 % | DIASTOLIC BLOOD PRESSURE: 84 MMHG | SYSTOLIC BLOOD PRESSURE: 140 MMHG | RESPIRATION RATE: 28 BRPM | HEART RATE: 124 BPM

## 2018-04-02 VITALS
HEART RATE: 130 BPM | DIASTOLIC BLOOD PRESSURE: 110 MMHG | SYSTOLIC BLOOD PRESSURE: 191 MMHG | OXYGEN SATURATION: 96 % | RESPIRATION RATE: 32 BRPM

## 2018-04-02 VITALS
DIASTOLIC BLOOD PRESSURE: 85 MMHG | SYSTOLIC BLOOD PRESSURE: 155 MMHG | TEMPERATURE: 98.8 F | OXYGEN SATURATION: 96 % | HEART RATE: 93 BPM | RESPIRATION RATE: 24 BRPM

## 2018-04-02 VITALS
OXYGEN SATURATION: 97 % | SYSTOLIC BLOOD PRESSURE: 188 MMHG | DIASTOLIC BLOOD PRESSURE: 114 MMHG | HEART RATE: 126 BPM | RESPIRATION RATE: 29 BRPM

## 2018-04-02 VITALS — OXYGEN SATURATION: 95 %

## 2018-04-02 VITALS
HEART RATE: 128 BPM | RESPIRATION RATE: 28 BRPM | OXYGEN SATURATION: 95 % | DIASTOLIC BLOOD PRESSURE: 79 MMHG | SYSTOLIC BLOOD PRESSURE: 127 MMHG | TEMPERATURE: 98.3 F

## 2018-04-02 VITALS
SYSTOLIC BLOOD PRESSURE: 162 MMHG | RESPIRATION RATE: 26 BRPM | OXYGEN SATURATION: 95 % | DIASTOLIC BLOOD PRESSURE: 100 MMHG | HEART RATE: 118 BPM

## 2018-04-02 VITALS
SYSTOLIC BLOOD PRESSURE: 190 MMHG | OXYGEN SATURATION: 96 % | HEART RATE: 124 BPM | RESPIRATION RATE: 29 BRPM | DIASTOLIC BLOOD PRESSURE: 102 MMHG

## 2018-04-02 VITALS
RESPIRATION RATE: 31 BRPM | HEART RATE: 124 BPM | SYSTOLIC BLOOD PRESSURE: 169 MMHG | OXYGEN SATURATION: 98 % | DIASTOLIC BLOOD PRESSURE: 97 MMHG

## 2018-04-02 VITALS — OXYGEN SATURATION: 92 %

## 2018-04-02 VITALS
DIASTOLIC BLOOD PRESSURE: 83 MMHG | OXYGEN SATURATION: 96 % | RESPIRATION RATE: 36 BRPM | HEART RATE: 122 BPM | SYSTOLIC BLOOD PRESSURE: 176 MMHG

## 2018-04-02 VITALS — HEART RATE: 106 BPM

## 2018-04-02 VITALS
SYSTOLIC BLOOD PRESSURE: 182 MMHG | OXYGEN SATURATION: 96 % | DIASTOLIC BLOOD PRESSURE: 89 MMHG | HEART RATE: 126 BPM | RESPIRATION RATE: 30 BRPM

## 2018-04-02 VITALS
SYSTOLIC BLOOD PRESSURE: 175 MMHG | DIASTOLIC BLOOD PRESSURE: 97 MMHG | RESPIRATION RATE: 23 BRPM | OXYGEN SATURATION: 99 % | HEART RATE: 114 BPM

## 2018-04-02 VITALS
RESPIRATION RATE: 20 BRPM | OXYGEN SATURATION: 98 % | DIASTOLIC BLOOD PRESSURE: 77 MMHG | HEART RATE: 98 BPM | SYSTOLIC BLOOD PRESSURE: 122 MMHG

## 2018-04-02 VITALS
TEMPERATURE: 98.1 F | OXYGEN SATURATION: 97 % | RESPIRATION RATE: 23 BRPM | SYSTOLIC BLOOD PRESSURE: 136 MMHG | HEART RATE: 128 BPM | DIASTOLIC BLOOD PRESSURE: 81 MMHG

## 2018-04-02 VITALS — OXYGEN SATURATION: 97 %

## 2018-04-02 VITALS — HEART RATE: 120 BPM

## 2018-04-02 VITALS
HEART RATE: 96 BPM | OXYGEN SATURATION: 100 % | SYSTOLIC BLOOD PRESSURE: 163 MMHG | RESPIRATION RATE: 19 BRPM | DIASTOLIC BLOOD PRESSURE: 98 MMHG

## 2018-04-02 VITALS — OXYGEN SATURATION: 98 %

## 2018-04-02 VITALS — HEART RATE: 110 BPM

## 2018-04-02 VITALS — HEART RATE: 116 BPM

## 2018-04-02 VITALS — HEART RATE: 98 BPM

## 2018-04-02 LAB
ALBUMIN SERPL-MCNC: 4.7 GM/DL (ref 3.4–5)
ALP SERPL-CCNC: 125 U/L (ref 45–117)
ALT SERPL-CCNC: 533 U/L (ref 12–78)
AST SERPL-CCNC: 24 U/L (ref 15–37)
BASOPHILS # BLD AUTO: 0.1 TH/MM3 (ref 0–0.2)
BASOPHILS NFR BLD: 0.8 % (ref 0–2)
BILIRUB SERPL-MCNC: 2.6 MG/DL (ref 0.2–1)
BUN SERPL-MCNC: 143 MG/DL (ref 7–18)
CALCIUM SERPL-MCNC: 9 MG/DL (ref 8.5–10.1)
CHLORIDE SERPL-SCNC: 90 MEQ/L (ref 98–107)
CREAT SERPL-MCNC: 5.5 MG/DL (ref 0.6–1.3)
EOSINOPHIL # BLD: 0 TH/MM3 (ref 0–0.4)
EOSINOPHIL NFR BLD: 0.1 % (ref 0–4)
ERYTHROCYTE [DISTWIDTH] IN BLOOD BY AUTOMATED COUNT: 22.4 % (ref 11.6–17.2)
GFR SERPLBLD BASED ON 1.73 SQ M-ARVRAT: 10 ML/MIN (ref 89–?)
GLUCOSE SERPL-MCNC: 204 MG/DL (ref 74–106)
HCO3 BLD-SCNC: 25.3 MEQ/L (ref 21–32)
HCT VFR BLD CALC: 26.5 % (ref 39–51)
HGB BLD-MCNC: 9.1 GM/DL (ref 13–17)
INR PPP: 1.1 RATIO
LYMPHOCYTES # BLD AUTO: 0.4 TH/MM3 (ref 1–4.8)
LYMPHOCYTES NFR BLD AUTO: 2.8 % (ref 9–44)
MCH RBC QN AUTO: 25.4 PG (ref 27–34)
MCHC RBC AUTO-ENTMCNC: 34.1 % (ref 32–36)
MCV RBC AUTO: 74.6 FL (ref 80–100)
MONOCYTE #: 0 TH/MM3 (ref 0–0.9)
MONOCYTES NFR BLD: 0.2 % (ref 0–8)
NEUTROPHILS # BLD AUTO: 14.3 TH/MM3 (ref 1.8–7.7)
NEUTROPHILS NFR BLD AUTO: 96.1 % (ref 16–70)
PF4 HEPARIN CMPLX AB SER QL: NEGATIVE
PLATELET # BLD: 31 TH/MM3 (ref 150–450)
PMV BLD AUTO: 7.8 FL (ref 7–11)
PROT SERPL-MCNC: 7.2 GM/DL (ref 6.4–8.2)
PROTHROMBIN TIME: 11.4 SEC (ref 9.8–11.6)
RBC # BLD AUTO: 3.56 MIL/MM3 (ref 4.5–5.9)
SODIUM SERPL-SCNC: 134 MEQ/L (ref 136–145)
WBC # BLD AUTO: 14.8 TH/MM3 (ref 4–11)

## 2018-04-02 RX ADMIN — AMIODARONE HYDROCHLORIDE SCH MG: 200 TABLET ORAL at 15:03

## 2018-04-02 RX ADMIN — HUMAN INSULIN SCH: 100 INJECTION, SOLUTION SUBCUTANEOUS at 10:07

## 2018-04-02 RX ADMIN — CALCIUM ACETATE SCH MG: 667 CAPSULE ORAL at 17:39

## 2018-04-02 RX ADMIN — PREDNISONE SCH MG: 5 SOLUTION ORAL at 10:02

## 2018-04-02 RX ADMIN — FUROSEMIDE SCH MG: 10 INJECTION, SOLUTION INTRAMUSCULAR; INTRAVENOUS at 10:05

## 2018-04-02 RX ADMIN — IPRATROPIUM BROMIDE AND ALBUTEROL SULFATE SCH AMPULE: .5; 3 SOLUTION RESPIRATORY (INHALATION) at 21:58

## 2018-04-02 RX ADMIN — WATER SCH ML: 1 IRRIGANT IRRIGATION at 10:01

## 2018-04-02 RX ADMIN — FAMOTIDINE SCH MG: 20 TABLET, FILM COATED ORAL at 10:04

## 2018-04-02 RX ADMIN — STANDARDIZED SENNA CONCENTRATE AND DOCUSATE SODIUM SCH TAB: 8.6; 5 TABLET, FILM COATED ORAL at 09:00

## 2018-04-02 RX ADMIN — MUPIROCIN CALCIUM SCH APPLIC: 20 OINTMENT TOPICAL at 10:00

## 2018-04-02 RX ADMIN — HUMAN INSULIN SCH: 100 INJECTION, SOLUTION SUBCUTANEOUS at 02:47

## 2018-04-02 RX ADMIN — IPRATROPIUM BROMIDE AND ALBUTEROL SULFATE SCH AMPULE: .5; 3 SOLUTION RESPIRATORY (INHALATION) at 09:24

## 2018-04-02 RX ADMIN — DEXMEDETOMIDINE HYDROCHLORIDE PRN MLS/HR: 100 INJECTION, SOLUTION, CONCENTRATE INTRAVENOUS at 05:32

## 2018-04-02 RX ADMIN — ALBUMIN HUMAN SCH MLS/HR: 0.25 SOLUTION INTRAVENOUS at 05:31

## 2018-04-02 RX ADMIN — IPRATROPIUM BROMIDE AND ALBUTEROL SULFATE SCH AMPULE: .5; 3 SOLUTION RESPIRATORY (INHALATION) at 15:17

## 2018-04-02 RX ADMIN — CHLORHEXIDINE GLUCONATE SCH PACK: 500 CLOTH TOPICAL at 02:46

## 2018-04-02 RX ADMIN — ALBUMIN HUMAN SCH MLS/HR: 0.25 SOLUTION INTRAVENOUS at 12:30

## 2018-04-02 RX ADMIN — CALCIUM ACETATE SCH MG: 667 CAPSULE ORAL at 12:30

## 2018-04-02 RX ADMIN — DIAZEPAM SCH MG: 10 TABLET ORAL at 13:36

## 2018-04-02 RX ADMIN — CHLORHEXIDINE GLUCONATE 0.12% ORAL RINSE SCH ML: 1.2 LIQUID ORAL at 10:01

## 2018-04-02 RX ADMIN — ALBUMIN HUMAN SCH MLS/HR: 0.25 SOLUTION INTRAVENOUS at 00:49

## 2018-04-02 RX ADMIN — METOLAZONE SCH MG: 5 TABLET ORAL at 10:04

## 2018-04-02 RX ADMIN — HUMAN INSULIN SCH: 100 INJECTION, SOLUTION SUBCUTANEOUS at 17:38

## 2018-04-02 RX ADMIN — DIAZEPAM SCH MG: 10 TABLET ORAL at 22:26

## 2018-04-02 RX ADMIN — FAMOTIDINE SCH MG: 20 TABLET, FILM COATED ORAL at 20:17

## 2018-04-02 RX ADMIN — MUPIROCIN CALCIUM SCH APPLIC: 20 OINTMENT TOPICAL at 20:17

## 2018-04-02 RX ADMIN — AZITHROMYCIN SCH MLS/HR: 500 INJECTION, POWDER, LYOPHILIZED, FOR SOLUTION INTRAVENOUS at 10:06

## 2018-04-02 RX ADMIN — DIAZEPAM SCH MG: 10 TABLET ORAL at 05:31

## 2018-04-02 RX ADMIN — DEXMEDETOMIDINE HYDROCHLORIDE PRN MLS/HR: 100 INJECTION, SOLUTION, CONCENTRATE INTRAVENOUS at 12:23

## 2018-04-02 RX ADMIN — HUMAN INSULIN SCH: 100 INJECTION, SOLUTION SUBCUTANEOUS at 12:32

## 2018-04-02 RX ADMIN — DEXMEDETOMIDINE HYDROCHLORIDE PRN MLS/HR: 100 INJECTION, SOLUTION, CONCENTRATE INTRAVENOUS at 15:56

## 2018-04-02 RX ADMIN — HUMAN INSULIN SCH: 100 INJECTION, SOLUTION SUBCUTANEOUS at 20:16

## 2018-04-02 RX ADMIN — DEXMEDETOMIDINE HYDROCHLORIDE PRN MLS/HR: 100 INJECTION, SOLUTION, CONCENTRATE INTRAVENOUS at 19:26

## 2018-04-02 RX ADMIN — HUMAN INSULIN SCH: 100 INJECTION, SOLUTION SUBCUTANEOUS at 00:15

## 2018-04-02 RX ADMIN — Medication SCH MG: at 10:03

## 2018-04-02 RX ADMIN — CHLORHEXIDINE GLUCONATE 0.12% ORAL RINSE SCH ML: 1.2 LIQUID ORAL at 20:16

## 2018-04-02 RX ADMIN — IPRATROPIUM BROMIDE AND ALBUTEROL SULFATE SCH AMPULE: .5; 3 SOLUTION RESPIRATORY (INHALATION) at 03:16

## 2018-04-02 RX ADMIN — ACYCLOVIR SCH TAB: 800 TABLET ORAL at 10:03

## 2018-04-02 RX ADMIN — DEXMEDETOMIDINE HYDROCHLORIDE PRN MLS/HR: 100 INJECTION, SOLUTION, CONCENTRATE INTRAVENOUS at 01:57

## 2018-04-02 RX ADMIN — STANDARDIZED SENNA CONCENTRATE AND DOCUSATE SODIUM SCH TAB: 8.6; 5 TABLET, FILM COATED ORAL at 20:17

## 2018-04-02 RX ADMIN — CALCIUM ACETATE SCH MG: 667 CAPSULE ORAL at 09:00

## 2018-04-02 RX ADMIN — AMIODARONE HYDROCHLORIDE SCH MG: 200 TABLET ORAL at 02:46

## 2018-04-02 NOTE — HHI.NPPN
Subjective


History of Present Illness


67-year-old male with a past medical history of chronic 


obstructive pulmonary disease, history of deep venous thrombosis with 


pulmonary embolism, atrial fibrillation, hypertension, ischemic heart 


disease, congestive heart failure, who was admitted from the nursing 


home with generalized weakness and altered mental status.


I was called to see the patient because of elevated BUN and 


creatinine.


Additional Remarks


Patient remain on the vent, and sedated, clinically same.





Review of Systems


General


General Remarks


Intubated and unresponsive.





Objective Data


Data











 4/2/18 4/3/18





 19:00 07:00


 


Intake Total 100 ml 


 


Balance 100 ml 


 


  


 


IV Total 100 ml 











Vital Signs








  Date Time  Temp Pulse Resp B/P (MAP) Pulse Ox O2 Delivery O2 Flow Rate FiO2


 


4/2/18 14:01  126 29 188/114 (138) 97   


 


4/2/18 13:31  122 31 158/78 (104) 94   


 


4/2/18 13:01  124 31 169/97 (121) 98   


 


4/2/18 12:01 97.8 114 29 143/99 (114) 98   


 


4/2/18 12:00        35


 


4/2/18 12:00  118      


 


4/2/18 11:28     93   35


 


4/2/18 11:01  118 26 162/100 (120) 95   


 


4/2/18 10:01  112 20 158/90 (112) 99   


 


4/2/18 10:00  110      


 


4/2/18 09:01  116 21 159/93 (115) 97   


 


4/2/18 08:01  124 28 140/84 (102) 96   


 


4/2/18 08:00  128      


 


4/2/18 08:00        35


 


4/2/18 07:25     98   35


 


4/2/18 07:06 98.3 128 28 127/79 (95) 95   


 


4/2/18 07:01  134 29 171/102 (125) 95   


 


4/2/18 06:01  132 27 155/104 (121) 98   


 


4/2/18 06:00  116      


 


4/2/18 05:10     98   35


 


4/2/18 05:01  114 22 149/87 (107) 97   


 


4/2/18 04:01 98.1 128 23 136/81 (99) 97   


 


4/2/18 04:00  120      


 


4/2/18 04:00        35


 


4/2/18 03:01  98 20 122/77 (92) 98   


 


4/2/18 02:01  96 19 163/98 (119) 100   


 


4/2/18 02:00  98      


 


4/2/18 01:25     98   35


 


4/2/18 01:01  114 23 175/97 (123) 99   


 


4/2/18 00:01 98.4 110 20 159/98 (118) 99   


 


4/2/18 00:00        35


 


4/2/18 00:00  106      


 


4/1/18 23:01  92 19 147/84 (105) 98   


 


4/1/18 22:45     98   35


 


4/1/18 22:01  94 20 138/75 (96) 97   


 


4/1/18 22:00  96      


 


4/1/18 21:01  114 27 153/100 (117) 96   


 


4/1/18 20:01 98.9 118 26 149/93 (111) 92   


 


4/1/18 20:00        35


 


4/1/18 20:00  118      


 


4/1/18 19:40     93   35


 


4/1/18 19:31  118 27 133/87 (102) 97   


 


4/1/18 18:13  110 28 124/84 (97) 96   


 


4/1/18 18:00  104 29 98/61 (73) 100   


 


4/1/18 18:00  116      


 


4/1/18 17:01 97.5 110 23 165/88 (113) 97   


 


4/1/18 16:40     97   35


 


4/1/18 16:31  106 22 133/86 (102) 87   


 


4/1/18 16:27  100 21 83/73 (76) 96   


 


4/1/18 16:01  166 22 201/114 (143) 96   


 


4/1/18 16:00        35


 


4/1/18 16:00  166      


 


4/1/18 15:20  154 17 195/130 (151) 98   


 


4/1/18 15:11  156 20 189/143 (158) 99   


 


4/1/18 15:01  150 17 168/118 (135) 100   








-:  


4/2/18 0421                                                                    

            4/2/18 0421








Physical Exam


General


Appearance Remarks


Intubated and unresponsive, sedated.





Eyes


Eye Exam:  Pupils Equal





Pulmonary


Resp Exam:  Breath Sounds Equal, No Distress, Rhonchi, Decreased Bases, 

Diminished Breath Sounds





Gastrointestinal/Abdomen


GI Exam:  Soft, Non-Tender, Bowel Sounds Present, Distended





Extremeties


Extremities Exam:  Trace Edema





Neurologic


Neuro Exam:  Unresponsive





Assessment/Plan


Assessment Summary:  CLARE/Acute Renal Failure


Problem List:  


(1) COPD with exacerbation


ICD Codes:  J44.1 - Chronic obstructive pulmonary disease with (acute) 

exacerbation


(2) Shock liver


ICD Codes:  K72.00 - Acute and subacute hepatic failure without coma


(3) Hypoalbuminemia


ICD Codes:  E88.09 - Other disorders of plasma-protein metabolism, not 

elsewhere classified


(4) Encephalopathy


ICD Codes:  G93.40 - Encephalopathy, unspecified


(5) CHF (congestive heart failure)


ICD Codes:  I50.9 - Heart failure, unspecified


(6) Cardiac arrest


ICD Codes:  I46.9 - Cardiac arrest, cause unspecified


(7) Acute kidney injury


ICD Codes:  N17.9 - Acute kidney failure, unspecified


Plan





Patient has PEA and develop CLARE.


Most likely has ATN.


Now started passing the urine,


On Lasix and Metolazone.


Urine out put is good.


BUN and Creatinine continue to increase.


No urgent need for HD.


K is normal and Hco3 is not low.


Possibly has Encephalopathy related to Cardiac arrest.


I will D/C the diuretics as he is passing the more urine.


D/W the sister on the phone, she want to discuss with intensivist about his 

condition.


I will D/W her again tomorrow about possibility of HD.











Marita Riojas MD Apr 2, 2018 14:38

## 2018-04-02 NOTE — HHI.CCPN
Subjective


Remarks/Hospital Course


3/27: overnight, started to become purposeful in upper extremities, and pupils 

are 3mm and now reactive. remains in shock with shock liver, acute kidney injury

, multiorgan failure. LFTs uptrending.





3/28: improvement in mental status. now following commands. discussion with 

sister: patient drinks heavily: 2-3 6-packs/day. liver enzymes still elevated, 

remains oliguric with Cr > 3 now and rising. albumin continues to fall which is 

likely combination of very poor nutritional status and poor liver synthetic 

function. blood sugar slightly improved. overall still in multiorgan failure. 

family appears to have poor insight into patient's condition: sister herself is 

going through rehab for a recent severe illness. now also starting tremors 

which may be early alcohol withdraw given new clinical history to suggest large 

etoh intake.





3/29: improvements in hemodynamics and off vasopressors. wbc downtrending. but 

no improvement and worsening in metabolic organ failure: Cr higher, remains 

oliguric. LFTs still high. remains on d10w and tube feeds with marginal blood 

glucose. acidosis continues to worsen despite adequate cardiac index.





3/30: kidney function continues to worsen, though uop slightly improved from 

yesterday. remains off vasopressors. afib RVR yesterday now controlled with 

amiodarone. overall no meaningful improvements- acidosis still too severe to 

allow extubation and renal function complicating acidosis. very poor dialysis 

candidate given overall functionality and current acute illness.





3/31: renal function worse, however now non-oliguric. off vasopressors. staph 

epi growing 2 different sensitivity profiles, so most likely contaminated 

sample. acidosis slightly better. 





4/1: Patient remains intubated critical.  Currently off sedation remains 

encephalopathic and very weak.  Very weakly followed commands by squeezing hand 

on the left hand, wiggled toes right foot.  Worsening renal failure  

creatinine 5 today.  Remains nonoliguric approximately 4 L urine output.  

Unless there is significant neurology clinical improvement he is not a 

candidate for hemodialysis.





04/02: No improvement of renal function. CXR looks like pulmonary venous 

congestion; suspect LV function is significantly reduced. ECHO report has a 

mixed interpretation, but looks like decompensated failure. We'll continue 

diuresis. Not really any role for inotropic agents. Better rate control will 

likely just produce depressed contractility. I'll try incremental digoxin 

adjusted for kidney function. Shock liver resolving. Afebrile but rising white 

count bears watching closely. The entire collection of medical problems and 

impaired neurological recovery makes long-term survival highly unlikely.





Objective





Vital Signs








  Date Time  Temp Pulse Resp B/P (MAP) Pulse Ox O2 Delivery O2 Flow Rate FiO2


 


4/2/18 06:01  132 27 155/104 (121) 98   


 


4/2/18 05:10        35


 


4/2/18 04:01 98.1       














Intake and Output   


 


 4/2/18 4/2/18 4/3/18





 08:00 16:00 00:00


 


Intake Total 616 ml  


 


Output Total 1600 ml  


 


Balance -984 ml  








Result Diagram:  


4/2/18 0421 4/2/18 0421





Objective Remarks


gen: 67-year-old  male, lying in bed, intubated, off sedation, 

tachypneic


heent: pupils 2mm, equal, reactive. mucous membranes moist. nc. at.


neck: no jvd. trachea midline.


chest: equal chest rise. coarse breath sounds, scattered coarse rhonchi. left 

subclavian cvl in place, site c/d/i.


cv: Hypertensive, irregularly irregular rhythm. afib.


abd: soft, nontender, nondistended. no guarding. quiet.


extr: distal pulses 2+. no peripheral edema. right radial art line in place, 

site c/d/i.


neuro: Eyes open spontaneously, patient is tachypneic.  Very weakly squeezed 

with left hand and wiggle right toes on command. +cough. + gag. + corneals.





A/P


Assessment and Plan


Assessment: 67yM who presented in extremis and progressed rapidly to PEA arrest

, most likely secondary to septic shock, and COPD exacerbation blood with Coag 

negative staph in 4/4 bottles. Unclear source of infection as sputum and urine 

cultures have not produced organism. He remains in multiorgan failure and his 

liver and kidneys are still severely dysfunctional without signs of 

improvement. remains critically ill. After long discussion with his sister and 

medical decision maker, plan to make him DNR with continued aggressive care.  

Acidosis and renal failure are biggest barriers today. will consult nephrology, 

although he would be an exceedingly poor renal replacement candidate given his 

pre-hospital poor functional status, his critical illness, and his multi organ 

dysfunction.  Aggressive goals remain and he continues to be critically ill and 

off pathway.





Plan by systems:





Neurologic:


Hypoxic-ischemic encephalopathy


Metabolic encephalopathy 


Alcohol Dependence


Alcohol withdrawal syndrome


Frequent neuro checks


Discontinue propofol and fentanyl


Start Precedex to facilitate ventilator weaning and neuro exam


iv thiamine, mvi


valium 5mg po q8h


clonidine 0.2mg po q8h.


Watch closely for alcohol withdrawal





Respiratory:


Acute hypoxic and hypercarbic respiratory failure - persistent.


Vent bundle


Head of bed at 30


Nebs


Daily SBTs-mental status will not permit extubation


Wean FiO2 for goal SPO2 greater than 90%


CT chest 3/29: resolving consolidation with small right effusion and middle 

lobe infiltrate.





Cardiovascular:


Septic shock - resolved.


Status post PEA arrest


Atrial fibrillation with rapid ventricular response


continue amiodarone 200mg po q12h.


clonidine 0.2mg po q8h


Continue IV Lasix as below


Digoxin 0.25 iv now, repaet after 4 hours if pulse > 90. He'll certainly 

tolerate up to 0.75 mg without toxicity.





Renal:


Acute kidney injury-worsening


Secondary septic shock, ATN


Continue Haynes


renal ultrasound 3/28: no evidence of hydronephrosis.


-- Strict I/Os


nephrology consult. Dr Riojas following


Very poor dialysis candidate, unless there is significant improvement in 

mentation


very volume overloaded.  Currently diuresis with lasix 100mg iv q6h and give 

with albumin 25gm q6h.


Reduced to 100 mg q12 by Dr. Riojas, as the BUN creat continue to rise. On 

metolazone 10 mg q12


Defer for dialysis decision to Dr. Riojas. Poor candidate secondary to poor 

overall prognosis





FEN/GI:


Acute protein calorie malnutrition -severe


Shock liver


Hyperkalemia


tube feeds


Trend LFTs


nutrition consult





Heme/ID:


Septic shock


Leukocytosis


holding vancomycin.  Consult pharmacy to dose


d/c azithro and zosyn.


check vanco level.


urine and sputum cultures NG


Blood cultures, coag negative staph 4/4 bottles, but different sensitivities: 

likely contaminant.  Repeat cultures negative today


urinary legionella and pneumococcal Ag: negative.


wbc and fever curve trending up again. 





Endocrine:


Hypoglycemia- improving.


Secondary to shock liver


d/c d10w


-- SSI





Prophylaxis:


GI Prophylaxis


IV Pepcid





DVT Prophylaxis


-- SCDs


heparin drip





Lines:


3/26 left subclavian triple-lumen catheter


3/26 right radial arterial line


Haynes





Dispo:


Remain in ICU.  Remains critically ill.





This patient remains critically ill with one or more organ systems which are or 

may become a threat to life.  I have spent in excess of 35 minutes 

discontinuously in the care and management of this patient.  This time is 

exclusive of procedures, and includes, but is not limited to, evaluation of the 

patient, review of the medical record, discussions with family, consultants, 

nursing staff, or respiratory therapy, and documentation in the medical record.











Jonathan Dior MD Apr 2, 2018 07:08

## 2018-04-02 NOTE — HHI.CCPN
Objective





Vital Signs








  Date Time  Temp Pulse Resp B/P (MAP) Pulse Ox O2 Delivery O2 Flow Rate FiO2


 


4/2/18 06:01  132 27 155/104 (121) 98   


 


4/2/18 05:10        35


 


4/2/18 04:01 98.1       














Intake and Output   


 


 4/2/18 4/2/18 4/3/18





 08:00 16:00 00:00


 


Intake Total 616 ml  


 


Output Total 1600 ml  


 


Balance -984 ml  








Result Diagram:  


4/2/18 0421                                                                    

            4/2/18 0421














Jonathan Dior MD Apr 2, 2018 06:56

## 2018-04-02 NOTE — RADRPT
EXAM DATE/TIME:  04/02/2018 06:15 

 

[facility] COMPARISON:     

CHEST SINGLE AP, April 01, 2018, 14:50.

 

                     

INDICATIONS :     

Respiratory disease.

                     

 

MEDICAL HISTORY :            

Chronic obstructive pulmonary disease. Cardiovascular disease. Diabetes mellitus type II. Deep venous
 thrombosis.Pulmonary embolism. Hypertension.   

 

SURGICAL HISTORY :     

None.   

 

ENCOUNTER:     

Subsequent                                        

 

ACUITY:     

1 day      

 

PAIN SCORE:     

Non-responsive.

 

LOCATION:     

Bilateral chest 

 

FINDINGS:     

Endotracheal tube nasogastric tube and central line remain in place. Hazy bilateral primarily basilar
 pleural-parenchymal opacities persist unchanged. Cardiac contours are stable.

 

CONCLUSION:     

No significant interval change

 

 

 

 Jorge Santos MD on April 02, 2018 at 6:56           

Board Certified Radiologist.

 This report was verified electronically.

## 2018-04-03 VITALS
RESPIRATION RATE: 29 BRPM | OXYGEN SATURATION: 98 % | DIASTOLIC BLOOD PRESSURE: 71 MMHG | HEART RATE: 88 BPM | SYSTOLIC BLOOD PRESSURE: 122 MMHG

## 2018-04-03 VITALS
OXYGEN SATURATION: 99 % | DIASTOLIC BLOOD PRESSURE: 93 MMHG | RESPIRATION RATE: 36 BRPM | HEART RATE: 90 BPM | SYSTOLIC BLOOD PRESSURE: 158 MMHG

## 2018-04-03 VITALS
RESPIRATION RATE: 28 BRPM | OXYGEN SATURATION: 99 % | DIASTOLIC BLOOD PRESSURE: 91 MMHG | SYSTOLIC BLOOD PRESSURE: 153 MMHG | HEART RATE: 94 BPM

## 2018-04-03 VITALS — OXYGEN SATURATION: 100 %

## 2018-04-03 VITALS
OXYGEN SATURATION: 96 % | SYSTOLIC BLOOD PRESSURE: 128 MMHG | DIASTOLIC BLOOD PRESSURE: 62 MMHG | RESPIRATION RATE: 20 BRPM | HEART RATE: 92 BPM

## 2018-04-03 VITALS
DIASTOLIC BLOOD PRESSURE: 59 MMHG | RESPIRATION RATE: 38 BRPM | OXYGEN SATURATION: 99 % | SYSTOLIC BLOOD PRESSURE: 109 MMHG | HEART RATE: 90 BPM

## 2018-04-03 VITALS
OXYGEN SATURATION: 99 % | DIASTOLIC BLOOD PRESSURE: 80 MMHG | RESPIRATION RATE: 34 BRPM | SYSTOLIC BLOOD PRESSURE: 164 MMHG | HEART RATE: 96 BPM

## 2018-04-03 VITALS
RESPIRATION RATE: 28 BRPM | SYSTOLIC BLOOD PRESSURE: 129 MMHG | DIASTOLIC BLOOD PRESSURE: 69 MMHG | OXYGEN SATURATION: 99 % | HEART RATE: 94 BPM

## 2018-04-03 VITALS
OXYGEN SATURATION: 100 % | RESPIRATION RATE: 20 BRPM | SYSTOLIC BLOOD PRESSURE: 120 MMHG | HEART RATE: 94 BPM | DIASTOLIC BLOOD PRESSURE: 71 MMHG

## 2018-04-03 VITALS
OXYGEN SATURATION: 98 % | RESPIRATION RATE: 40 BRPM | SYSTOLIC BLOOD PRESSURE: 142 MMHG | DIASTOLIC BLOOD PRESSURE: 78 MMHG | HEART RATE: 112 BPM

## 2018-04-03 VITALS
OXYGEN SATURATION: 99 % | DIASTOLIC BLOOD PRESSURE: 89 MMHG | SYSTOLIC BLOOD PRESSURE: 175 MMHG | RESPIRATION RATE: 27 BRPM | HEART RATE: 104 BPM

## 2018-04-03 VITALS
OXYGEN SATURATION: 100 % | HEART RATE: 84 BPM | SYSTOLIC BLOOD PRESSURE: 131 MMHG | RESPIRATION RATE: 20 BRPM | DIASTOLIC BLOOD PRESSURE: 67 MMHG

## 2018-04-03 VITALS
OXYGEN SATURATION: 97 % | HEART RATE: 108 BPM | DIASTOLIC BLOOD PRESSURE: 83 MMHG | RESPIRATION RATE: 28 BRPM | SYSTOLIC BLOOD PRESSURE: 169 MMHG

## 2018-04-03 VITALS
SYSTOLIC BLOOD PRESSURE: 128 MMHG | DIASTOLIC BLOOD PRESSURE: 88 MMHG | TEMPERATURE: 98.4 F | RESPIRATION RATE: 41 BRPM | HEART RATE: 92 BPM | OXYGEN SATURATION: 99 %

## 2018-04-03 VITALS
HEART RATE: 88 BPM | RESPIRATION RATE: 20 BRPM | OXYGEN SATURATION: 100 % | SYSTOLIC BLOOD PRESSURE: 149 MMHG | DIASTOLIC BLOOD PRESSURE: 79 MMHG

## 2018-04-03 VITALS
OXYGEN SATURATION: 100 % | SYSTOLIC BLOOD PRESSURE: 131 MMHG | RESPIRATION RATE: 35 BRPM | HEART RATE: 82 BPM | DIASTOLIC BLOOD PRESSURE: 70 MMHG

## 2018-04-03 VITALS
RESPIRATION RATE: 22 BRPM | OXYGEN SATURATION: 100 % | HEART RATE: 99 BPM | DIASTOLIC BLOOD PRESSURE: 68 MMHG | SYSTOLIC BLOOD PRESSURE: 111 MMHG

## 2018-04-03 VITALS
SYSTOLIC BLOOD PRESSURE: 148 MMHG | HEART RATE: 114 BPM | DIASTOLIC BLOOD PRESSURE: 86 MMHG | OXYGEN SATURATION: 98 % | RESPIRATION RATE: 36 BRPM

## 2018-04-03 VITALS
DIASTOLIC BLOOD PRESSURE: 69 MMHG | OXYGEN SATURATION: 100 % | HEART RATE: 82 BPM | RESPIRATION RATE: 29 BRPM | SYSTOLIC BLOOD PRESSURE: 131 MMHG

## 2018-04-03 VITALS
RESPIRATION RATE: 32 BRPM | SYSTOLIC BLOOD PRESSURE: 164 MMHG | HEART RATE: 98 BPM | OXYGEN SATURATION: 100 % | DIASTOLIC BLOOD PRESSURE: 89 MMHG

## 2018-04-03 VITALS
RESPIRATION RATE: 39 BRPM | DIASTOLIC BLOOD PRESSURE: 60 MMHG | SYSTOLIC BLOOD PRESSURE: 91 MMHG | HEART RATE: 100 BPM | OXYGEN SATURATION: 100 %

## 2018-04-03 VITALS
SYSTOLIC BLOOD PRESSURE: 101 MMHG | RESPIRATION RATE: 20 BRPM | DIASTOLIC BLOOD PRESSURE: 57 MMHG | OXYGEN SATURATION: 98 % | HEART RATE: 82 BPM | TEMPERATURE: 98 F

## 2018-04-03 VITALS
DIASTOLIC BLOOD PRESSURE: 76 MMHG | SYSTOLIC BLOOD PRESSURE: 141 MMHG | OXYGEN SATURATION: 100 % | RESPIRATION RATE: 39 BRPM | HEART RATE: 86 BPM

## 2018-04-03 VITALS
HEART RATE: 90 BPM | SYSTOLIC BLOOD PRESSURE: 115 MMHG | RESPIRATION RATE: 20 BRPM | OXYGEN SATURATION: 94 % | DIASTOLIC BLOOD PRESSURE: 66 MMHG

## 2018-04-03 VITALS
OXYGEN SATURATION: 99 % | RESPIRATION RATE: 36 BRPM | HEART RATE: 108 BPM | DIASTOLIC BLOOD PRESSURE: 90 MMHG | SYSTOLIC BLOOD PRESSURE: 146 MMHG

## 2018-04-03 VITALS
DIASTOLIC BLOOD PRESSURE: 100 MMHG | SYSTOLIC BLOOD PRESSURE: 166 MMHG | HEART RATE: 110 BPM | RESPIRATION RATE: 28 BRPM | OXYGEN SATURATION: 99 %

## 2018-04-03 VITALS
DIASTOLIC BLOOD PRESSURE: 89 MMHG | RESPIRATION RATE: 24 BRPM | OXYGEN SATURATION: 98 % | HEART RATE: 110 BPM | SYSTOLIC BLOOD PRESSURE: 164 MMHG

## 2018-04-03 VITALS
OXYGEN SATURATION: 99 % | DIASTOLIC BLOOD PRESSURE: 76 MMHG | HEART RATE: 110 BPM | TEMPERATURE: 98.3 F | RESPIRATION RATE: 34 BRPM | SYSTOLIC BLOOD PRESSURE: 127 MMHG

## 2018-04-03 VITALS
RESPIRATION RATE: 34 BRPM | SYSTOLIC BLOOD PRESSURE: 165 MMHG | OXYGEN SATURATION: 99 % | HEART RATE: 114 BPM | DIASTOLIC BLOOD PRESSURE: 91 MMHG

## 2018-04-03 VITALS
SYSTOLIC BLOOD PRESSURE: 126 MMHG | DIASTOLIC BLOOD PRESSURE: 67 MMHG | HEART RATE: 96 BPM | OXYGEN SATURATION: 100 % | RESPIRATION RATE: 33 BRPM

## 2018-04-03 VITALS
RESPIRATION RATE: 32 BRPM | SYSTOLIC BLOOD PRESSURE: 178 MMHG | OXYGEN SATURATION: 98 % | DIASTOLIC BLOOD PRESSURE: 88 MMHG | HEART RATE: 122 BPM | TEMPERATURE: 98.5 F

## 2018-04-03 VITALS
DIASTOLIC BLOOD PRESSURE: 80 MMHG | RESPIRATION RATE: 30 BRPM | SYSTOLIC BLOOD PRESSURE: 162 MMHG | HEART RATE: 106 BPM | OXYGEN SATURATION: 99 %

## 2018-04-03 VITALS
SYSTOLIC BLOOD PRESSURE: 173 MMHG | DIASTOLIC BLOOD PRESSURE: 88 MMHG | HEART RATE: 120 BPM | OXYGEN SATURATION: 100 % | RESPIRATION RATE: 36 BRPM

## 2018-04-03 VITALS
SYSTOLIC BLOOD PRESSURE: 139 MMHG | TEMPERATURE: 98.8 F | OXYGEN SATURATION: 100 % | RESPIRATION RATE: 20 BRPM | DIASTOLIC BLOOD PRESSURE: 72 MMHG | HEART RATE: 90 BPM

## 2018-04-03 VITALS
OXYGEN SATURATION: 98 % | SYSTOLIC BLOOD PRESSURE: 167 MMHG | HEART RATE: 120 BPM | RESPIRATION RATE: 22 BRPM | DIASTOLIC BLOOD PRESSURE: 92 MMHG

## 2018-04-03 VITALS — HEART RATE: 106 BPM

## 2018-04-03 VITALS — OXYGEN SATURATION: 98 %

## 2018-04-03 VITALS
OXYGEN SATURATION: 99 % | DIASTOLIC BLOOD PRESSURE: 95 MMHG | RESPIRATION RATE: 32 BRPM | HEART RATE: 118 BPM | SYSTOLIC BLOOD PRESSURE: 173 MMHG

## 2018-04-03 VITALS
HEART RATE: 102 BPM | OXYGEN SATURATION: 98 % | RESPIRATION RATE: 33 BRPM | DIASTOLIC BLOOD PRESSURE: 88 MMHG | SYSTOLIC BLOOD PRESSURE: 146 MMHG

## 2018-04-03 VITALS
RESPIRATION RATE: 22 BRPM | DIASTOLIC BLOOD PRESSURE: 84 MMHG | OXYGEN SATURATION: 100 % | HEART RATE: 88 BPM | SYSTOLIC BLOOD PRESSURE: 130 MMHG

## 2018-04-03 VITALS
DIASTOLIC BLOOD PRESSURE: 67 MMHG | SYSTOLIC BLOOD PRESSURE: 134 MMHG | OXYGEN SATURATION: 99 % | HEART RATE: 86 BPM | RESPIRATION RATE: 33 BRPM

## 2018-04-03 VITALS
RESPIRATION RATE: 20 BRPM | HEART RATE: 90 BPM | DIASTOLIC BLOOD PRESSURE: 62 MMHG | OXYGEN SATURATION: 100 % | SYSTOLIC BLOOD PRESSURE: 119 MMHG

## 2018-04-03 VITALS
HEART RATE: 110 BPM | DIASTOLIC BLOOD PRESSURE: 78 MMHG | RESPIRATION RATE: 28 BRPM | SYSTOLIC BLOOD PRESSURE: 166 MMHG | OXYGEN SATURATION: 100 % | TEMPERATURE: 98.6 F

## 2018-04-03 VITALS — HEART RATE: 94 BPM

## 2018-04-03 VITALS — OXYGEN SATURATION: 99 %

## 2018-04-03 VITALS — HEART RATE: 91 BPM

## 2018-04-03 LAB
ALBUMIN SERPL-MCNC: 4.5 GM/DL (ref 3.4–5)
ALP SERPL-CCNC: 148 U/L (ref 45–117)
ALT SERPL-CCNC: 419 U/L (ref 12–78)
AST SERPL-CCNC: 27 U/L (ref 15–37)
BILIRUB SERPL-MCNC: 2 MG/DL (ref 0.2–1)
BUN SERPL-MCNC: 157 MG/DL (ref 7–18)
CALCIUM SERPL-MCNC: 9.1 MG/DL (ref 8.5–10.1)
CHLORIDE SERPL-SCNC: 90 MEQ/L (ref 98–107)
CREAT SERPL-MCNC: 5.9 MG/DL (ref 0.6–1.3)
ERYTHROCYTE [DISTWIDTH] IN BLOOD BY AUTOMATED COUNT: 22.3 % (ref 11.6–17.2)
GFR SERPLBLD BASED ON 1.73 SQ M-ARVRAT: 10 ML/MIN (ref 89–?)
GLUCOSE SERPL-MCNC: 228 MG/DL (ref 74–106)
HCO3 BLD-SCNC: 25.9 MEQ/L (ref 21–32)
HCT VFR BLD CALC: 27.2 % (ref 39–51)
HGB BLD-MCNC: 8.2 GM/DL (ref 13–17)
INR PPP: 1.2 RATIO
MCH RBC QN AUTO: 23 PG (ref 27–34)
MCHC RBC AUTO-ENTMCNC: 30.3 % (ref 32–36)
MCV RBC AUTO: 76 FL (ref 80–100)
PLATELET # BLD: 45 TH/MM3 (ref 150–450)
PMV BLD AUTO: 9.2 FL (ref 7–11)
PROT SERPL-MCNC: 7 GM/DL (ref 6.4–8.2)
PROTHROMBIN TIME: 11.7 SEC (ref 9.8–11.6)
RBC # BLD AUTO: 3.58 MIL/MM3 (ref 4.5–5.9)
SODIUM SERPL-SCNC: 134 MEQ/L (ref 136–145)
WBC # BLD AUTO: 14.6 TH/MM3 (ref 4–11)

## 2018-04-03 RX ADMIN — WATER SCH ML: 1 IRRIGANT IRRIGATION at 09:15

## 2018-04-03 RX ADMIN — IPRATROPIUM BROMIDE AND ALBUTEROL SULFATE SCH AMPULE: .5; 3 SOLUTION RESPIRATORY (INHALATION) at 08:23

## 2018-04-03 RX ADMIN — CHLORHEXIDINE GLUCONATE SCH PACK: 500 CLOTH TOPICAL at 03:50

## 2018-04-03 RX ADMIN — HUMAN INSULIN SCH: 100 INJECTION, SOLUTION SUBCUTANEOUS at 20:54

## 2018-04-03 RX ADMIN — MUPIROCIN CALCIUM SCH APPLIC: 20 OINTMENT TOPICAL at 09:15

## 2018-04-03 RX ADMIN — AMIODARONE HYDROCHLORIDE SCH MG: 200 TABLET ORAL at 18:08

## 2018-04-03 RX ADMIN — DIAZEPAM SCH MG: 10 TABLET ORAL at 05:30

## 2018-04-03 RX ADMIN — PROPOFOL PRN MLS/HR: 10 INJECTION, EMULSION INTRAVENOUS at 00:03

## 2018-04-03 RX ADMIN — STANDARDIZED SENNA CONCENTRATE AND DOCUSATE SODIUM SCH TAB: 8.6; 5 TABLET, FILM COATED ORAL at 22:09

## 2018-04-03 RX ADMIN — HUMAN INSULIN SCH: 100 INJECTION, SOLUTION SUBCUTANEOUS at 23:58

## 2018-04-03 RX ADMIN — CALCIUM ACETATE SCH MG: 667 CAPSULE ORAL at 18:08

## 2018-04-03 RX ADMIN — Medication SCH MG: at 09:00

## 2018-04-03 RX ADMIN — DIAZEPAM SCH MG: 10 TABLET ORAL at 22:08

## 2018-04-03 RX ADMIN — CALCIUM ACETATE SCH MG: 667 CAPSULE ORAL at 16:04

## 2018-04-03 RX ADMIN — DIAZEPAM SCH MG: 10 TABLET ORAL at 14:58

## 2018-04-03 RX ADMIN — IPRATROPIUM BROMIDE AND ALBUTEROL SULFATE SCH AMPULE: .5; 3 SOLUTION RESPIRATORY (INHALATION) at 14:46

## 2018-04-03 RX ADMIN — ACYCLOVIR SCH TAB: 800 TABLET ORAL at 09:23

## 2018-04-03 RX ADMIN — FAMOTIDINE SCH MG: 20 TABLET, FILM COATED ORAL at 09:21

## 2018-04-03 RX ADMIN — HUMAN INSULIN SCH: 100 INJECTION, SOLUTION SUBCUTANEOUS at 11:52

## 2018-04-03 RX ADMIN — STANDARDIZED SENNA CONCENTRATE AND DOCUSATE SODIUM SCH TAB: 8.6; 5 TABLET, FILM COATED ORAL at 09:22

## 2018-04-03 RX ADMIN — HUMAN INSULIN SCH: 100 INJECTION, SOLUTION SUBCUTANEOUS at 05:40

## 2018-04-03 RX ADMIN — CHLORHEXIDINE GLUCONATE 0.12% ORAL RINSE SCH ML: 1.2 LIQUID ORAL at 09:15

## 2018-04-03 RX ADMIN — PROPOFOL PRN MLS/HR: 10 INJECTION, EMULSION INTRAVENOUS at 09:17

## 2018-04-03 RX ADMIN — IPRATROPIUM BROMIDE AND ALBUTEROL SULFATE SCH AMPULE: .5; 3 SOLUTION RESPIRATORY (INHALATION) at 21:54

## 2018-04-03 RX ADMIN — AMIODARONE HYDROCHLORIDE SCH MG: 200 TABLET ORAL at 03:50

## 2018-04-03 RX ADMIN — AZITHROMYCIN SCH MLS/HR: 500 INJECTION, POWDER, LYOPHILIZED, FOR SOLUTION INTRAVENOUS at 09:31

## 2018-04-03 RX ADMIN — CHLORHEXIDINE GLUCONATE 0.12% ORAL RINSE SCH ML: 1.2 LIQUID ORAL at 20:54

## 2018-04-03 RX ADMIN — IPRATROPIUM BROMIDE AND ALBUTEROL SULFATE SCH AMPULE: .5; 3 SOLUTION RESPIRATORY (INHALATION) at 03:38

## 2018-04-03 RX ADMIN — HUMAN INSULIN SCH: 100 INJECTION, SOLUTION SUBCUTANEOUS at 16:15

## 2018-04-03 RX ADMIN — HUMAN INSULIN SCH: 100 INJECTION, SOLUTION SUBCUTANEOUS at 00:21

## 2018-04-03 RX ADMIN — PROPOFOL PRN MLS/HR: 10 INJECTION, EMULSION INTRAVENOUS at 17:53

## 2018-04-03 RX ADMIN — PHENYTOIN SODIUM SCH MLS/HR: 50 INJECTION INTRAMUSCULAR; INTRAVENOUS at 11:47

## 2018-04-03 RX ADMIN — PREDNISONE SCH MG: 5 SOLUTION ORAL at 09:00

## 2018-04-03 RX ADMIN — HUMAN INSULIN SCH: 100 INJECTION, SOLUTION SUBCUTANEOUS at 09:15

## 2018-04-03 RX ADMIN — CALCIUM ACETATE SCH MG: 667 CAPSULE ORAL at 11:46

## 2018-04-03 RX ADMIN — MUPIROCIN CALCIUM SCH APPLIC: 20 OINTMENT TOPICAL at 22:38

## 2018-04-03 RX ADMIN — FAMOTIDINE SCH MG: 20 TABLET, FILM COATED ORAL at 22:09

## 2018-04-03 NOTE — HHI.NPPN
Subjective


History of Present Illness


67-year-old male with a past medical history of chronic 


obstructive pulmonary disease, history of deep venous thrombosis with 


pulmonary embolism, atrial fibrillation, hypertension, ischemic heart 


disease, congestive heart failure, who was admitted from the nursing 


home with generalized weakness and altered mental status.


I was called to see the patient because of elevated BUN and 


creatinine.


Additional Remarks


Patient remain on the vent, and sedated.





Review of Systems


General


General Remarks


Intubated and unresponsive.





Objective Data


Data











 4/3/18 4/4/18





 19:00 07:00


 


Intake Total 1058 ml 


 


Output Total 1380 ml 


 


Balance -322 ml 


 


  


 


Intake Oral 0 ml 


 


IV Total 403 ml 


 


Tube Feeding 655 ml 


 


Output Urine Total 1300 ml 


 


Stool Total 80 ml 











Vital Signs








  Date Time  Temp Pulse Resp B/P (MAP) Pulse Ox O2 Delivery O2 Flow Rate FiO2


 


4/3/18 17:51     99   35


 


4/3/18 17:00  100 39 91/60 (70) 100   


 


4/3/18 16:00  110      


 


4/3/18 16:00 98.3 110 34 127/76 (93) 99   


 


4/3/18 16:00        35


 


4/3/18 15:00  116      


 


4/3/18 15:00  120 36 173/88 (116) 100   


 


4/3/18 14:46     100   35


 


4/3/18 14:00  114      


 


4/3/18 14:00  112 34 165/91 (115) 99   


 


4/3/18 13:00  118      


 


4/3/18 13:00  118 32 173/95 (121) 99   


 


4/3/18 12:00        35


 


4/3/18 12:00  118      


 


4/3/18 12:00 98.5 122 32 178/88 (118) 98   


 


4/3/18 11:00  114      


 


4/3/18 11:00  108 36 148/86 (106) 98   


 


4/3/18 10:23     100   35


 


4/3/18 10:00  108      


 


4/3/18 10:00  112 40 142/78 (99) 98   


 


4/3/18 09:00  110      


 


4/3/18 09:00  108 36 146/90 (108) 99   


 


4/3/18 08:30        35


 


4/3/18 08:17     98   35


 


4/3/18 08:04     100   35


 


4/3/18 08:00  92      


 


4/3/18 08:00        35


 


4/3/18 08:00 98.4 92 41 128/88 (101) 99   


 


4/3/18 07:00  90 38 109/59 (76) 99   


 


4/3/18 07:00  90      


 


4/3/18 06:15        35


 


4/3/18 06:00  99      


 


4/3/18 06:00  100 22 111/68 (82) 100   


 


4/3/18 05:30  108 28 169/83 (111) 97   


 


4/3/18 05:00  110 24 164/89 (114) 98   


 


4/3/18 04:42     98   35


 


4/3/18 04:30  120 22 167/92 (117) 98   


 


4/3/18 04:00  110      


 


4/3/18 04:00 98.6 110 28 166/78 (107) 100   


 


4/3/18 04:00        35


 


4/3/18 03:30  106 30 162/80 (107) 99   


 


4/3/18 03:00  94 28 153/91 (111) 99   


 


4/3/18 02:45  86 39 141/76 (97) 100   


 


4/3/18 02:30  90 36 158/93 (114) 99   


 


4/3/18 02:15  82 29 131/69 (89) 100   


 


4/3/18 02:00  82 35 131/70 (90) 100   


 


4/3/18 02:00  82      


 


4/3/18 01:45  86 33 134/67 (89) 99   


 


4/3/18 01:30  96 34 164/80 (108) 99   


 


4/3/18 01:15  98 32 164/89 (114) 100   


 


4/3/18 01:02     98   35


 


4/3/18 01:00  102 33 146/88 (107) 98   


 


4/3/18 00:45  88 29 122/71 (88) 98   


 


4/3/18 00:30  94 28 129/69 (89) 99   


 


4/3/18 00:21  110 28 166/100 (122) 99   


 


4/3/18 00:02  104 27 175/89 (117) 99   


 


4/3/18 00:00        35


 


4/3/18 00:00  106      


 


4/2/18 23:30 98.8 93 24 155/85 (108) 96   


 


4/2/18 23:02  110 29 163/86 (111) 96   


 


4/2/18 22:32  108 24 157/88 (111) 99   


 


4/2/18 22:28  96 24 107/57 (74) 99   


 


4/2/18 22:10  102 26 106/61 (76) 99   


 


4/2/18 22:02  116      


 


4/2/18 22:02  116 24 185/103 (130) 92   


 


4/2/18 22:00  122      


 


4/2/18 21:56     92   35


 


4/2/18 21:02  122 36 176/83 (114) 96   


 


4/2/18 20:32  126 30 182/89 (120) 96   


 


4/2/18 20:02 98.5 120 31 201/112 (141) 95   


 


4/2/18 20:00  120      


 


4/2/18 20:00        35


 


4/2/18 19:35     96   35


 


4/2/18 19:32  122 30 199/103 (135) 96   








-:  


4/3/18 0430                                                                    

            4/3/18 0430








Physical Exam


General


Appearance Remarks


Intubated and unresponsive, sedated.





Eyes


Eye Exam:  Pupils Equal





Pulmonary


Resp Exam:  Breath Sounds Equal, No Distress, Rhonchi, Decreased Bases, 

Diminished Breath Sounds





Gastrointestinal/Abdomen


GI Exam:  Soft, Non-Tender, Bowel Sounds Present, Distended





Extremeties


Extremities Exam:  Trace Edema





Neurologic


Neuro Exam:  Unresponsive





Assessment/Plan


Assessment Summary:  CLARE/Acute Renal Failure


Problem List:  


(1) COPD with exacerbation


ICD Codes:  J44.1 - Chronic obstructive pulmonary disease with (acute) 

exacerbation


(2) Shock liver


ICD Codes:  K72.00 - Acute and subacute hepatic failure without coma


(3) Hypoalbuminemia


ICD Codes:  E88.09 - Other disorders of plasma-protein metabolism, not 

elsewhere classified


(4) Encephalopathy


ICD Codes:  G93.40 - Encephalopathy, unspecified


(5) CHF (congestive heart failure)


ICD Codes:  I50.9 - Heart failure, unspecified


(6) Cardiac arrest


ICD Codes:  I46.9 - Cardiac arrest, cause unspecified


(7) Acute kidney injury


ICD Codes:  N17.9 - Acute kidney failure, unspecified


Plan





Patient has PEA and develop CLARE.


Most likely has ATN.


Now started passing the urine,


On Lasix and Metolazone.


Urine out put is good.


BUN and Creatinine continue to increase.


No urgent need for HD.


K is normal and Hco3 is not low.


Possibly has Encephalopathy related to Cardiac arrest.


BUN/Creatinine continue to increase.


I will add gentle hydration.


Palliative care following.











Marita Riojas MD Apr 3, 2018 19:04

## 2018-04-03 NOTE — HHI.CCPN
Subjective


Remarks/Hospital Course


3/27: overnight, started to become purposeful in upper extremities, and pupils 

are 3mm and now reactive. remains in shock with shock liver, acute kidney injury

, multiorgan failure. LFTs uptrending.





3/28: improvement in mental status. now following commands. discussion with 

sister: patient drinks heavily: 2-3 6-packs/day. liver enzymes still elevated, 

remains oliguric with Cr > 3 now and rising. albumin continues to fall which is 

likely combination of very poor nutritional status and poor liver synthetic 

function. blood sugar slightly improved. overall still in multiorgan failure. 

family appears to have poor insight into patient's condition: sister herself is 

going through rehab for a recent severe illness. now also starting tremors 

which may be early alcohol withdraw given new clinical history to suggest large 

etoh intake.





3/29: improvements in hemodynamics and off vasopressors. wbc downtrending. but 

no improvement and worsening in metabolic organ failure: Cr higher, remains 

oliguric. LFTs still high. remains on d10w and tube feeds with marginal blood 

glucose. acidosis continues to worsen despite adequate cardiac index.





3/30: kidney function continues to worsen, though uop slightly improved from 

yesterday. remains off vasopressors. afib RVR yesterday now controlled with 

amiodarone. overall no meaningful improvements- acidosis still too severe to 

allow extubation and renal function complicating acidosis. very poor dialysis 

candidate given overall functionality and current acute illness.





3/31: renal function worse, however now non-oliguric. off vasopressors. staph 

epi growing 2 different sensitivity profiles, so most likely contaminated 

sample. acidosis slightly better. 





4/1: Patient remains intubated critical.  Currently off sedation remains 

encephalopathic and very weak.  Very weakly followed commands by squeezing hand 

on the left hand, wiggled toes right foot.  Worsening renal failure  

creatinine 5 today.  Remains nonoliguric approximately 4 L urine output.  

Unless there is significant neurology clinical improvement he is not a 

candidate for hemodialysis.





04/02: No improvement of renal function. CXR looks like pulmonary venous 

congestion; suspect LV function is significantly reduced. ECHO report has a 

mixed interpretation, but looks like decompensated failure. We'll continue 

diuresis. Not really any role for inotropic agents. Better rate control will 

likely just produce depressed contractility. I'll try incremental digoxin 

adjusted for kidney function. Shock liver resolving. Afebrile but rising white 

count bears watching closely. The entire collection of medical problems and 

impaired neurological recovery makes long-term survival highly unlikely.





04/03: Continued adequate response to diuretics but azotemia worsening, 

consistent with acutely decreased ventricular function. The balancing act 

between heart failure and renal failure continues. This bodes poorly for his 

recovery from this illness. I would recommend avoiding tracheostomy or 

hemodialysis. His neurological recovery will be limited from this point forward.





Objective





Vital Signs








  Date Time  Temp Pulse Resp B/P (MAP) Pulse Ox O2 Delivery O2 Flow Rate FiO2


 


4/3/18 06:15        35


 


4/3/18 06:00  99      


 


4/3/18 06:00   22 111/68 (82) 100   


 


4/3/18 04:00 98.6       














Intake and Output   


 


 4/3/18 4/3/18 4/4/18





 08:00 16:00 00:00


 


Intake Total 797 ml  


 


Output Total 1800 ml  


 


Balance -1003 ml  








Result Diagram:  


4/3/18 0430                                                                    

            4/3/18 0430





Objective Remarks


gen: 67-year-old  male, lying in bed, intubated, off sedation, 

tachypneic


heent: pupils 2mm, equal, reactive. mucous membranes moist. nc. at.


neck: + jvd. trachea midline.


chest: equal chest rise. coarse breath sounds, scattered coarse rhonchi. left 

subclavian cvl in place, site c/d/i.


cv: Hypertensive, irregularly irregular rhythm. afib. rate in 90s now after 

digoxin


abd: soft, nontender, nondistended. no guarding. quiet.


extr: distal pulses 2+. no peripheral edema. right radial art line in place, 

site c/d/i.


neuro: Eyes open intermittently spontaneously, patient is less tachypneic.  +

cough. + gag. + corneals.





A/P


Assessment and Plan








 Plan 


A/P


Assessment and Plan


Assessment: 67yM who presented in extremis and progressed rapidly to PEA arrest

, most likely secondary to septic shock, and COPD exacerbation blood with Coag 

negative staph in 4/4 bottles. Unclear source of infection as sputum and urine 

cultures have not produced organism. He remains in multiorgan failure and his 

liver and kidneys are still severely dysfunctional without signs of 

improvement. remains critically ill. After long discussion with his sister and 

medical decision maker, plan to make him DNR with continued aggressive care.  

Acidosis and renal failure are biggest barriers today. will consult nephrology, 

although he would be an exceedingly poor renal replacement candidate given his 

pre-hospital poor functional status, his critical illness, and his multi organ 

dysfunction.  Aggressive goals remain and he continues to be critically ill and 

off pathway. Short term and long term outlook is quite poor.





Plan by systems:





Neurologic:


Hypoxic-ischemic encephalopathy


Metabolic encephalopathy 


Alcohol Dependence


Alcohol withdrawal syndrome


Frequent neuro checks


Discontinue propofol and fentanyl


Start Precedex to facilitate ventilator weaning and neuro exam


iv thiamine, mvi


valium 5mg po q8h


clonidine 0.2mg po q8h.


Watch closely for alcohol withdrawal





Respiratory:


Acute hypoxic and hypercarbic respiratory failure - persistent.


Vent bundle


Head of bed at 30


Nebs


Daily SBTs-mental status will not permit extubation


Wean FiO2 for goal SPO2 greater than 90%


CT chest 3/29: resolving consolidation with small right effusion and middle 

lobe infiltrate.





Cardiovascular:


Septic shock - resolved.


Status post PEA arrest


Atrial fibrillation with rapid ventricular response


continue amiodarone 200mg po q12h.


clonidine 0.2mg po q8h


Continue IV Lasix as below


Digoxin 0.25 iv now, repaet after 4 hours if pulse > 90. He'll certainly 

tolerate up to 0.75 mg without toxicity.





Renal:


Acute kidney injury-worsening


Secondary septic shock, ATN


Continue Haynes


renal ultrasound 3/28: no evidence of hydronephrosis.


-- Strict I/Os


nephrology consult. Dr Riojas following


Very poor dialysis candidate, unless there is significant improvement in 

mentation


very volume overloaded.  Currently diuresis with lasix 100mg iv q6h and give 

with albumin 25gm q6h.


Reduced to 100 mg q12 by Dr. Riojas, as the BUN creat continue to rise. On 

metolazone 10 mg q12


Defer for dialysis decision to Dr. Riojas. Poor candidate secondary to poor 

overall prognosis





FEN/GI:


Acute protein calorie malnutrition -severe


Shock liver


Hyperkalemia


tube feeds


Trend LFTs


nutrition consult





Heme/ID:


Septic shock


Leukocytosis


holding vancomycin.  Consult pharmacy to dose


d/c azithro and zosyn.


check vanco level.


urine and sputum cultures NG


Blood cultures, coag negative staph 4/4 bottles, but different sensitivities: 

likely contaminant.  Repeat cultures negative today


urinary legionella and pneumococcal Ag: negative.


wbc and fever curve trending up again. 





Endocrine:


Hypoglycemia- improving.


Secondary to shock liver


d/c d10w


-- SSI





Prophylaxis:


GI Prophylaxis


IV Pepcid





DVT Prophylaxis


-- SCDs


heparin drip





Lines:


3/26 left subclavian triple-lumen catheter


3/26 right radial arterial line


Haynes





Dispo:


Remain in ICU.  Remains critically ill. Neurological recovery has been minimal 

and not likely to improve much more at this point. Urgent dilemma remains 

systolic heart failure and renal failure, which precludes recovery in the 

contect of the anoxic brain injury.





This patient remains critically ill with one or more organ systems which are or 

may become a threat to life.  I have spent in excess of 35 minutes 

discontinuously in the care and management of this patient.  This time is 

exclusive of procedures, and includes, but is not limited to, evaluation of the 

patient, review of the medical record, discussions with family, consultants, 

nursing staff, or respiratory therapy, and documentation in the medical record.











Jonathan Dior MD Apr 3, 2018 07:14

## 2018-04-03 NOTE — HHI.HCPN
Reason for visit


   a.  To assist with evaluation and management of symptoms including:  dyspnea

; encephalopathy, pain, anxiety


   b.  To assist medical decision maker(s) with: better understanding of 

current medical conditions; weighing benefits/burdens of medical treatment 

options; making  medical treatment decisions.


.


 (Sarah Farmer)





Subjective/Interval History


Patient seen to follow-up on symptom management and goals of care.





He remains intubated, sedated for vent synchrony.  His sister is at bedside.  

Failed spontaneous breathing trial today secondary to use of accessory muscles 

and tachycardia.  He tolerated CPAP with sedation but failed without.  Patient 

is not opening his eyes to verbal stimuli or following commands.  His renal 

indices continue to worsen consistent with his level of heart failure.  His 

sister, who is his decision maker, is declining dialysis as she feels he has no 

reasonable expectation of recovery and is entertaining a hospice consult 

tomorrow to evaluate the possibility of moving him to a care center for 

extubation and end-of-life care.





Vital signs show blood pressure 173/88, heart rate 120, respiratory rate 36, 

oxygen saturation 100% on 35% FiO2, Afebrile.  Laboratory values show WBC 14.6, 

hemoglobin 8.2, hematocrit 27.2, platelets 45, sodium 134, potassium 5.5, 

chloride 90, , creatinine 5.9, random glucose 228, total bilirubin 2.0, 

, alkaline phosphatase 148.





.


Family/friend interactions


I spoke with the sister, Serene, at bedside and provided supportive 

listening.  She feels a heavy weight making this decision alone, in spite of 

support and regular contact from her family, she is at the hospital alone and 

is very appreciative of the care provided to herself and her brother by the 

nursing staff and physicians.  She is considering transitioning her brother to 

hospice for compassionate withdrawal.  She has requested that the hospice 

consult be scheduled for tomorrow and I have informed hospice admissions of her 

request.  She was concerned that he was not able to be transported on the 

ventilator and I did assure her that that is a service that hospice provides 

and that her brother could be transported on a ventilator to the care center 

and have extubation performed there.  I also apprised her of being able to stay 

at the care center if she wished.  She prefers the [place] care center as 

it is closest to her residence.  





.


 (Sarah Farmer)





Advance Directives


Living Will:  Copy in medical record


Health Care Surrogate:  Copy in medical record


Durable Power of :  Copy in medical record (DURABLE POWER OF  

for healthcare)


 (Sarah Farmer)





Advance Directive Specifics


Date completed:


2011


.


Health Care Surrogate(s):


He has named his sister Cee Dejesus as his primary healthcare surrogate and 

his other sister Sue Womack as his alternate


.


Documented care wishes:


Living will on chart.


.


 (Sarah Farmer)





Objective





Vital Signs








  Date Time  Temp Pulse Resp B/P (MAP) Pulse Ox O2 Delivery O2 Flow Rate FiO2


 


4/3/18 16:00  110      


 


4/3/18 16:00        35


 


4/3/18 15:00  116      


 


4/3/18 15:00  120 36 173/88 (116) 100   


 


4/3/18 14:46     100   35


 


4/3/18 14:00  114      


 


4/3/18 14:00  112 34 165/91 (115) 99   


 


4/3/18 13:00  118      


 


4/3/18 13:00  118 32 173/95 (121) 99   


 


4/3/18 12:00        35


 


4/3/18 12:00  118      


 


4/3/18 12:00 98.5 122 32 178/88 (118) 98   


 


4/3/18 11:00  114      


 


4/3/18 11:00  108 36 148/86 (106) 98   


 


4/3/18 10:23     100   35


 


4/3/18 10:00  108      


 


4/3/18 10:00  112 40 142/78 (99) 98   


 


4/3/18 09:00  110      


 


4/3/18 09:00  108 36 146/90 (108) 99   


 


4/3/18 08:30        35


 


4/3/18 08:17     98   35


 


4/3/18 08:04     100   35


 


4/3/18 08:00  92      


 


4/3/18 08:00        35


 


4/3/18 08:00 98.4 92 41 128/88 (101) 99   


 


4/3/18 07:00  90 38 109/59 (76) 99   


 


4/3/18 07:00  90      


 


4/3/18 06:15        35


 


4/3/18 06:00  99      


 


4/3/18 06:00  100 22 111/68 (82) 100   


 


4/3/18 05:30  108 28 169/83 (111) 97   


 


4/3/18 05:00  110 24 164/89 (114) 98   


 


4/3/18 04:42     98   35


 


4/3/18 04:30  120 22 167/92 (117) 98   


 


4/3/18 04:00  110      


 


4/3/18 04:00 98.6 110 28 166/78 (107) 100   


 


4/3/18 04:00        35


 


4/3/18 03:30  106 30 162/80 (107) 99   


 


4/3/18 03:00  94 28 153/91 (111) 99   


 


4/3/18 02:45  86 39 141/76 (97) 100   


 


4/3/18 02:30  90 36 158/93 (114) 99   


 


4/3/18 02:15  82 29 131/69 (89) 100   


 


4/3/18 02:00  82 35 131/70 (90) 100   


 


4/3/18 02:00  82      


 


4/3/18 01:45  86 33 134/67 (89) 99   


 


4/3/18 01:30  96 34 164/80 (108) 99   


 


4/3/18 01:15  98 32 164/89 (114) 100   


 


4/3/18 01:02     98   35


 


4/3/18 01:00  102 33 146/88 (107) 98   


 


4/3/18 00:45  88 29 122/71 (88) 98   


 


4/3/18 00:30  94 28 129/69 (89) 99   


 


4/3/18 00:21  110 28 166/100 (122) 99   


 


4/3/18 00:02  104 27 175/89 (117) 99   


 


4/3/18 00:00        35


 


4/3/18 00:00  106      


 


/18 23:30 98.8 93 24 155/85 (108) 96   


 


18 23:02  110 29 163/86 (111) 96   


 


18 22:32  108 24 157/88 (111) 99   


 


18 22:28  96 24 107/57 (74) 99   


 


18 22:10  102 26 106/61 (76) 99   


 


18 22:02  116      


 


218 22:02  116 24 185/103 (130) 92   


 


18 22:00  122      


 


4/2/18 21:56     92   35


 


18 21:02  122 36 176/83 (114) 96   


 


18 20:32  126 30 182/89 (120) 96   


 


18 20:02 98.5 120 31 201/112 (141) 95   


 


18 20:00  120      


 


18 20:00        35


 


18 19:35     96   35


 


18 19:32  122 30 199/103 (135) 96   


 


18 19:02  124 29 190/102 (131) 96   


 


18 18:28     97   35


 


18 18:00  100 30 140/68 (92) 97   


 


18 18:00  100      


 


18 17:01  130 32 191/110 (137) 96   


 


18 17:01  130      














Intake & Output  


 


 4/3/18 4/3/18





 07:00 19:00


 


Intake Total 797 ml 303 ml


 


Output Total 1800 ml 


 


Balance -1003 ml 303 ml


 


  


 


Intake Oral 0 ml 


 


IV Total 147 ml 303 ml


 


Tube Feeding 650 ml 


 


Output Urine Total 1700 ml 


 


Stool Total 100 ml 








Physical Exam


CONSTITUTIONAL/GENERAL: This is an adequately nourished patient intubated, 

sedated, in no acute distress.


TUBES/LINES/DRAINS: Orotracheal tube; orogastric tube; Haynes catheter; soft 

wrist restraints; left subclavian CVL; peripheral IVs


SKIN: No jaundice, rashes.  There is a fading bruise on the inner right thigh.  

Other smaller ecchymoses on the extremities. . No wounds seen anteriorly. Skin 

temperature appropriate. Not diaphoretic. 


CARDIOVASCULAR: S1, S2, intermittently tachycardic rate, irregular rhythm, 

adequate peripheral pulses, no rub murmur or gallop auscultated.


RESPIRATORY/CHEST: Lungs diminished, coarse breath sounds, rare wheeze, 

scattered rhonchi.  On mechanical vent.


GASTROINTESTINAL: Abdomen soft, distended. No hepato-splenomegaly, or palpable 

masses. No guarding. Bowel sounds hypoactive.


GENITOURINARY: Without palpable bladder distension. Haynes catheter in place.


MUSCULOSKELETAL: Extremities without clubbing, cyanosis, or edema.  Slight 

mottling of the feet is noted.


NEUROLOGICAL: Unresponsive.  No spontaneous movements noted.  No response to 

commands today while on sedation vacation.


PSYCHIATRIC: Unable to assess due to level of responsiveness.


.


 (Sarah Farmer)





Diagnostic Tests


Laboratory





Laboratory Tests








Test


  18


03:31 18


04:21 4/3/18


04:30


 


White Blood Count


  8.1 TH/MM3


(4.0-11.0) 14.8 TH/MM3


(4.0-11.0) 14.6 TH/MM3


(4.0-11.0)


 


Red Blood Count


  3.35 MIL/MM3


(4.50-5.90) 3.56 MIL/MM3


(4.50-5.90) 3.58 MIL/MM3


(4.50-5.90)


 


Hemoglobin


  8.2 GM/DL


(13.0-17.0) 9.1 GM/DL


(13.0-17.0) 8.2 GM/DL


(13.0-17.0)


 


Hematocrit


  24.8 %


(39.0-51.0) 26.5 %


(39.0-51.0) 27.2 %


(39.0-51.0)


 


Mean Corpuscular Volume


  74.1 FL


(80.0-100.0) 74.6 FL


(80.0-100.0) 76.0 FL


(80.0-100.0)


 


Mean Corpuscular Hemoglobin


  24.4 PG


(27.0-34.0) 25.4 PG


(27.0-34.0) 23.0 PG


(27.0-34.0)


 


Mean Corpuscular Hemoglobin


Concent 33.0 %


(32.0-36.0) 34.1 %


(32.0-36.0) 30.3 %


(32.0-36.0)


 


Red Cell Distribution Width


  22.0 %


(11.6-17.2) 22.4 %


(11.6-17.2) 22.3 %


(11.6-17.2)


 


Platelet Count


  35 TH/MM3


(150-450) 31 TH/MM3


(150-450) 45 TH/MM3


(150-450)


 


Mean Platelet Volume


  9.0 FL


(7.0-11.0) 7.8 FL


(7.0-11.0) 9.2 FL


(7.0-11.0)


 


Prothrombin Time


  11.3 SEC


(9.8-11.6) 11.4 SEC


(9.8-11.6) 11.7 SEC


(9.8-11.6)


 


Prothromb Time International


Ratio 1.1 RATIO 


  1.1 RATIO 


  1.2 RATIO 


 


 


Activated Partial


Thromboplast Time 28.0 SEC


(24.3-30.1) 25.4 SEC


(24.3-30.1) 26.5 SEC


(24.3-30.1)


 


Blood Urea Nitrogen


  118 MG/DL


(7-18) 143 MG/DL


(7-18) 157 MG/DL


(7-18)


 


Creatinine


  5.00 MG/DL


(0.60-1.30) 5.50 MG/DL


(0.60-1.30) 5.90 MG/DL


(0.60-1.30)


 


Random Glucose


  225 MG/DL


() 204 MG/DL


() 228 MG/DL


()


 


Total Protein


  6.6 GM/DL


(6.4-8.2) 7.2 GM/DL


(6.4-8.2) 7.0 GM/DL


(6.4-8.2)


 


Albumin


  3.9 GM/DL


(3.4-5.0) 4.7 GM/DL


(3.4-5.0) 4.5 GM/DL


(3.4-5.0)


 


Calcium Level


  7.8 MG/DL


(8.5-10.1) 9.0 MG/DL


(8.5-10.1) 9.1 MG/DL


(8.5-10.1)


 


Alkaline Phosphatase


  119 U/L


() 125 U/L


() 148 U/L


()


 


Aspartate Amino Transf


(AST/SGOT) 33 U/L (15-37) 


  24 U/L (15-37) 


  27 U/L (15-37) 


 


 


Alanine Aminotransferase


(ALT/SGPT) 822 U/L


(12-78) 533 U/L


(12-78) 419 U/L


(12-78)


 


Total Bilirubin


  2.2 MG/DL


(0.2-1.0) 2.6 MG/DL


(0.2-1.0) 2.0 MG/DL


(0.2-1.0)


 


Sodium Level


  134 MEQ/L


(136-145) 134 MEQ/L


(136-145) 134 MEQ/L


(136-145)


 


Potassium Level


  4.8 MEQ/L


(3.5-5.1) 5.4 MEQ/L


(3.5-5.1) 5.5 MEQ/L


(3.5-5.1)


 


Chloride Level


  93 MEQ/L


() 90 MEQ/L


() 90 MEQ/L


()


 


Carbon Dioxide Level


  24.6 MEQ/L


(21.0-32.0) 25.3 MEQ/L


(21.0-32.0) 25.9 MEQ/L


(21.0-32.0)


 


Anion Gap


  16 MEQ/L


(5-15) 19 MEQ/L


(5-15) 18 MEQ/L


(5-15)


 


Estimat Glomerular Filtration


Rate 12 ML/MIN


(>89) 10 ML/MIN


(>89) 10 ML/MIN


(>89)


 


Neutrophils (%) (Auto)


  


  96.1 %


(16.0-70.0) 


 


 


Lymphocytes (%) (Auto)


  


  2.8 %


(9.0-44.0) 


 


 


Monocytes (%) (Auto)


  


  0.2 %


(0.0-8.0) 


 


 


Eosinophils (%) (Auto)


  


  0.1 %


(0.0-4.0) 


 


 


Basophils (%) (Auto)


  


  0.8 %


(0.0-2.0) 


 


 


Neutrophils # (Auto)


  


  14.3 TH/MM3


(1.8-7.7) 


 


 


Lymphocytes # (Auto)


  


  0.4 TH/MM3


(1.0-4.8) 


 


 


Monocytes # (Auto)


  


  0.0 TH/MM3


(0-0.9) 


 


 


Eosinophils # (Auto)


  


  0.0 TH/MM3


(0-0.4) 


 


 


Basophils # (Auto)


  


  0.1 TH/MM3


(0-0.2) 


 


 


CBC Comment  AUTO DIFF  


 


Differential Comment


  


  AUTO DIFF


CONFIRMED 


 


 


Platelet Estimate  LOW (NORMAL)  


 


Platelet Morphology Comment


  


  NORMAL


(NORMAL) 


 








 (Sarah Farmer)


Result Diagram:  


4/3/18 0430                                                                    

            4/3/18 0430





Microbiology





Microbiology








 Date/Time


Source Procedure


Growth Status


 


 


 3/30/18 11:30


Blood Peripheral Aerobic Blood Culture - Preliminary


NO GROWTH IN 4 DAYS Resulted


 


 3/30/18 11:30


Blood Peripheral Anaerobic Blood Culture - Preliminary


NO GROWTH IN 4 DAYS Resulted





 3/26/18 23:10


Sputum Endotracheal Gram Stain - Final Complete


 


 3/26/18 23:10


Sputum Endotracheal Sputum Culture - Final


NO GROWTH IN 48 HOURS. Complete





 3/27/18 09:30


Urine Catheterized Urine Legionella Antigen - Final


PRESUMPTIVE NEGATIVE FOR LEGIONELLA P... Complete


 


 3/27/18 09:30


Urine Catheterized Urine Streptococcus pneumoniae Antigen (M - Final


PRESUMPTIVE NEGATIVE FOR STREPTOCOCCU... Complete








Imaging





Last Impressions








Chest X-Ray 18 0600 Signed





Impressions: 





 Service Date/Time:  2018 06:15 - CONCLUSION:  No significant 





 interval change     Jorge Santos MD 


 


Chest CT 3/29/18 0000 Signed





Impressions: 





 Service Date/Time:   11:55 - CONCLUSION:  There has 

been 





 overall improvement with increased aeration of both upper lung fields and 





 improving resolution of the previously noted scattered interstitial and 

airspace 





 infiltrates especially in both upper lung fields. There continues to be 





 infiltrates in both lung bases with some compressive atelectasis. Small 





 bilateral effusions.      Hermelindo Rodriguez MD 


 


Renal Ultrasound 3/28/18 0000 Signed





Impressions: 





 Service Date/Time:  2018 08:50 - CONCLUSION:  1. No 





 evidence of hydronephrosis. 2. Increased echogenicity of the renal parenchyma 





 bilaterally suggestive of chronic medical renal disease.     Hermelindo Rodriguez MD 


 


Head CT 3/26/18 0919 Signed





Impressions: 





 Service Date/Time:  2018 13:25 - CONCLUSION: Negative 





 noncontrast CT.     Rob Marshall MD 


 


Liver Ultrasound 3/26/18 0000 Signed





Impressions: 





 Service Date/Time:  2018 16:24 - CONCLUSION:  1. Minimal 

free 





 fluid in the abdomen 2. Mildly distended gallbladder without evidence of 





 cholelithiasis. 3. No evidence of biliary obstructive disease or focal liver 





 abnormality.     Lencho Montes MD 








Procedures


* 3/26 intubation/mechanical ventilation.  Intubation by EMS at nursing home.


* 3/26 right radial arterial line placement


* 3/26 left subclavian triple-lumen catheter placement


.


 (Sarah Farmer)





Assessment and Plan


Disease Oriented Problem List:  


(1) Cardiac arrest


(2) Respiratory failure


(3) Shock liver


(4) COPD with exacerbation


(5) Severe sepsis


(6) Acute kidney injury


(7) Atrial fibrillation with RVR


(8) Pulmonary edema


(9) CHF (congestive heart failure)


(10) Pneumonia


(11) Hypoalbuminemia


Symptom Scale:  


(1) Pain


0-10 Scale:  Unable to quantify


Comment:  Unclear if patient had prehospitalization pain syndromes.  Current 

sources of pain might include recent chest compressions; orotracheal and 

orogastric intubations; Haynes catheter; venous access catheters; restraints; 

prolonged bedbound status.


.





(2) Dyspnea


0-10 Scale:  Unable to quantify


Comment:  Dyspnea probably secondary to a combination of underlying COPD; 

tachycardia; and congestive heart failure.  Dyspnea currently managed on the 

ventilator.


.





(3) Encephalopathy


0-10 Scale:  Unable to quantify





Pertinent Non-Medical Issues


Psychosocial: He was born in [facility] and moved to [facility] as a young child where he spent most of his life.  He was in the [employer] and 

served in the [place] War.  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  He has 1 daughter, Edilia Jeffers who lives in 

[place] and one son, from whom he is estranged who changed his name to Reid Ryan.  He was 1 of 5 children.  2 sisters are  and he has 1 sister, 

Sue who is his healthcare surrogate and one brother in [place].  Patient was 

a resident of [facility].  Sister has been located and produced 

advanced directive paperwork naming her as healthcare surrogate.





Spiritual: Not an important concept to the patient per the sister.





Legal: Healthcare surrogate and living will on chart.





Ethical issues impacting care: Patient is incapacitated.  It appears unlikely 

at this time that he will regain capacity to make his own healthcare decisions.

  His sister is willing to serve as his healthcare surrogate.


.


Important Contacts


Edilia Jeffers, [place] (daughter) 





Katelynn Womack (sister) [phone] healthcare surrogate


.


Prognosis


Patient has multiple underlying comorbidities.  He came in in respiratory 

distress and suffered a asystolic cardiac arrest.  He is now going into shock 

liver.  Kidney function is declining.  Prognosis is poor at this time.  It is 

uncertain if patient will survive this hospitalization.  If he does survive the 

hospitalization it is unclear what type of quality of life he will return to.  

Prognostication would also be helped if I was clear about his functional status 

prior to this event.


.


Code Status:  No Code


Plan


==  Code Status: DNR


== Decision Making: Patient is incapacitated to make his own healthcare 

decisions.  At this point it is very unlikely that he will recover capacity to 

do so.  His sister Katelynn is his healthcare surrogate





== Goals medical treatment: Comfort oriented at this time.





== Symptoms:


* Pain: Unclear if patient had prehospitalization pain syndromes.  He is no 

longer responding to questions regarding pain, even on sedation vacation.  

Current sources of pain might include recent chest compressions; orotracheal 

and orogastric intubations; Haynes catheter; venous access catheters; restraints

; prolonged bedbound status.





* Dyspnea:   probably secondary to a combination of underlying COPD; atrial 

fibrillation and congestive heart failure.  Dyspnea currently managed on the 

ventilator.  Failing SBT.  Sister is considering withdrawal of life support and 

hospice consult has been requested.





* Encephalopathy:  Probably metabolic and multi-factorial to include sepsis, 

renal failure, heart failure.  Where he was previously responding to command, 

he is no longer providing any response when off sedation.  





* Anxiety: Per nursing home and the patient's sister, he has a severe anxiety 

disorder and has been chronically on benzodiazepines.  He is a heavy alcohol 

user of 2-3 6 packs per day, but has not been drinking during the last 2 weeks 

as he has been in a nursing home.  He is receiving Valium 5 mg every 8 hours 

and no further tremors are seen at this evaluation.











== Palliative care will continue to follow to assist with symptom management 

and to further clarify goals of medical treatment as the clinical course 

evolves.


.


 (Sarah Farmer)





Attestation


To help prompt me to consider important information that might be impacting 

today's encounter and assessment, information from prior notes written by 

myself or my colleagues may have been "brought forward" into today's note.  My 

signature on this note, however, is an attestation that I personally performed 

the exam, history, and/or decision-making noted today, and, unless otherwise 

indicated, the interactions with patient, family, and staff as well as the 

review of records all occurred today.  I also attest that the listed assessment 

and stated plan reflect my best clinical judgment today based on the 

combination of historical information, prior notes, and today's exam/ 

interactions.  When time spent is documented, it refers only to time spent 

today by the signer, or if indicated, combined time spent today by 

collaborating physician/nurse practitioner.


.


 (Sarah Farmer)


Collaborating MD Comments


Chart reviewed.  Case discussed with palliative care ARNP.  Above ARNP note 

reviewed and I concur.


.


 (Deonte Carrizales MD)











Sarah Farmer Apr 3, 2018 17:12


Deonte Carrizales MD 2018 05:41

## 2018-04-04 VITALS
HEART RATE: 114 BPM | OXYGEN SATURATION: 97 % | SYSTOLIC BLOOD PRESSURE: 144 MMHG | DIASTOLIC BLOOD PRESSURE: 80 MMHG | RESPIRATION RATE: 48 BRPM

## 2018-04-04 VITALS
SYSTOLIC BLOOD PRESSURE: 120 MMHG | DIASTOLIC BLOOD PRESSURE: 70 MMHG | HEART RATE: 102 BPM | OXYGEN SATURATION: 99 % | RESPIRATION RATE: 41 BRPM

## 2018-04-04 VITALS
DIASTOLIC BLOOD PRESSURE: 65 MMHG | RESPIRATION RATE: 27 BRPM | OXYGEN SATURATION: 98 % | HEART RATE: 102 BPM | SYSTOLIC BLOOD PRESSURE: 137 MMHG

## 2018-04-04 VITALS
OXYGEN SATURATION: 99 % | DIASTOLIC BLOOD PRESSURE: 75 MMHG | SYSTOLIC BLOOD PRESSURE: 121 MMHG | RESPIRATION RATE: 20 BRPM | HEART RATE: 104 BPM

## 2018-04-04 VITALS
OXYGEN SATURATION: 98 % | SYSTOLIC BLOOD PRESSURE: 149 MMHG | RESPIRATION RATE: 29 BRPM | HEART RATE: 112 BPM | DIASTOLIC BLOOD PRESSURE: 84 MMHG

## 2018-04-04 VITALS
HEART RATE: 126 BPM | RESPIRATION RATE: 22 BRPM | OXYGEN SATURATION: 95 % | SYSTOLIC BLOOD PRESSURE: 173 MMHG | DIASTOLIC BLOOD PRESSURE: 81 MMHG

## 2018-04-04 VITALS
HEART RATE: 112 BPM | SYSTOLIC BLOOD PRESSURE: 154 MMHG | OXYGEN SATURATION: 97 % | DIASTOLIC BLOOD PRESSURE: 92 MMHG | RESPIRATION RATE: 27 BRPM

## 2018-04-04 VITALS
RESPIRATION RATE: 20 BRPM | HEART RATE: 80 BPM | OXYGEN SATURATION: 98 % | SYSTOLIC BLOOD PRESSURE: 110 MMHG | DIASTOLIC BLOOD PRESSURE: 57 MMHG

## 2018-04-04 VITALS
DIASTOLIC BLOOD PRESSURE: 77 MMHG | RESPIRATION RATE: 20 BRPM | SYSTOLIC BLOOD PRESSURE: 120 MMHG | OXYGEN SATURATION: 99 % | HEART RATE: 84 BPM

## 2018-04-04 VITALS
RESPIRATION RATE: 20 BRPM | HEART RATE: 98 BPM | OXYGEN SATURATION: 100 % | DIASTOLIC BLOOD PRESSURE: 73 MMHG | SYSTOLIC BLOOD PRESSURE: 119 MMHG

## 2018-04-04 VITALS
OXYGEN SATURATION: 97 % | DIASTOLIC BLOOD PRESSURE: 78 MMHG | RESPIRATION RATE: 22 BRPM | HEART RATE: 108 BPM | SYSTOLIC BLOOD PRESSURE: 128 MMHG

## 2018-04-04 VITALS
RESPIRATION RATE: 26 BRPM | DIASTOLIC BLOOD PRESSURE: 69 MMHG | HEART RATE: 104 BPM | SYSTOLIC BLOOD PRESSURE: 134 MMHG | OXYGEN SATURATION: 99 %

## 2018-04-04 VITALS
HEART RATE: 106 BPM | RESPIRATION RATE: 19 BRPM | SYSTOLIC BLOOD PRESSURE: 124 MMHG | DIASTOLIC BLOOD PRESSURE: 72 MMHG | OXYGEN SATURATION: 96 %

## 2018-04-04 VITALS
DIASTOLIC BLOOD PRESSURE: 65 MMHG | RESPIRATION RATE: 22 BRPM | HEART RATE: 110 BPM | TEMPERATURE: 98 F | SYSTOLIC BLOOD PRESSURE: 137 MMHG | OXYGEN SATURATION: 98 %

## 2018-04-04 VITALS
OXYGEN SATURATION: 98 % | DIASTOLIC BLOOD PRESSURE: 69 MMHG | RESPIRATION RATE: 26 BRPM | HEART RATE: 112 BPM | SYSTOLIC BLOOD PRESSURE: 133 MMHG

## 2018-04-04 VITALS
OXYGEN SATURATION: 98 % | HEART RATE: 114 BPM | DIASTOLIC BLOOD PRESSURE: 73 MMHG | SYSTOLIC BLOOD PRESSURE: 136 MMHG | TEMPERATURE: 97.9 F | RESPIRATION RATE: 28 BRPM

## 2018-04-04 VITALS
DIASTOLIC BLOOD PRESSURE: 77 MMHG | HEART RATE: 104 BPM | RESPIRATION RATE: 20 BRPM | OXYGEN SATURATION: 99 % | SYSTOLIC BLOOD PRESSURE: 134 MMHG

## 2018-04-04 VITALS
OXYGEN SATURATION: 98 % | DIASTOLIC BLOOD PRESSURE: 99 MMHG | SYSTOLIC BLOOD PRESSURE: 120 MMHG | HEART RATE: 102 BPM | RESPIRATION RATE: 42 BRPM

## 2018-04-04 VITALS
OXYGEN SATURATION: 99 % | RESPIRATION RATE: 26 BRPM | HEART RATE: 102 BPM | DIASTOLIC BLOOD PRESSURE: 69 MMHG | SYSTOLIC BLOOD PRESSURE: 119 MMHG

## 2018-04-04 VITALS
OXYGEN SATURATION: 98 % | SYSTOLIC BLOOD PRESSURE: 129 MMHG | DIASTOLIC BLOOD PRESSURE: 63 MMHG | HEART RATE: 120 BPM | RESPIRATION RATE: 20 BRPM

## 2018-04-04 VITALS
RESPIRATION RATE: 30 BRPM | SYSTOLIC BLOOD PRESSURE: 145 MMHG | HEART RATE: 108 BPM | OXYGEN SATURATION: 99 % | DIASTOLIC BLOOD PRESSURE: 79 MMHG

## 2018-04-04 VITALS
HEART RATE: 86 BPM | RESPIRATION RATE: 20 BRPM | OXYGEN SATURATION: 99 % | SYSTOLIC BLOOD PRESSURE: 98 MMHG | DIASTOLIC BLOOD PRESSURE: 56 MMHG

## 2018-04-04 VITALS
HEART RATE: 86 BPM | OXYGEN SATURATION: 100 % | SYSTOLIC BLOOD PRESSURE: 117 MMHG | RESPIRATION RATE: 22 BRPM | DIASTOLIC BLOOD PRESSURE: 60 MMHG | TEMPERATURE: 97.8 F

## 2018-04-04 VITALS — HEART RATE: 76 BPM

## 2018-04-04 VITALS
SYSTOLIC BLOOD PRESSURE: 143 MMHG | OXYGEN SATURATION: 99 % | RESPIRATION RATE: 39 BRPM | HEART RATE: 114 BPM | DIASTOLIC BLOOD PRESSURE: 72 MMHG

## 2018-04-04 VITALS
HEART RATE: 114 BPM | DIASTOLIC BLOOD PRESSURE: 75 MMHG | RESPIRATION RATE: 26 BRPM | OXYGEN SATURATION: 98 % | SYSTOLIC BLOOD PRESSURE: 144 MMHG

## 2018-04-04 VITALS
RESPIRATION RATE: 34 BRPM | SYSTOLIC BLOOD PRESSURE: 123 MMHG | OXYGEN SATURATION: 99 % | DIASTOLIC BLOOD PRESSURE: 68 MMHG | HEART RATE: 92 BPM

## 2018-04-04 VITALS
RESPIRATION RATE: 20 BRPM | OXYGEN SATURATION: 99 % | DIASTOLIC BLOOD PRESSURE: 61 MMHG | HEART RATE: 80 BPM | SYSTOLIC BLOOD PRESSURE: 100 MMHG

## 2018-04-04 VITALS
OXYGEN SATURATION: 95 % | RESPIRATION RATE: 21 BRPM | HEART RATE: 122 BPM | SYSTOLIC BLOOD PRESSURE: 157 MMHG | DIASTOLIC BLOOD PRESSURE: 90 MMHG

## 2018-04-04 VITALS
SYSTOLIC BLOOD PRESSURE: 144 MMHG | RESPIRATION RATE: 26 BRPM | HEART RATE: 110 BPM | OXYGEN SATURATION: 98 % | DIASTOLIC BLOOD PRESSURE: 78 MMHG

## 2018-04-04 VITALS
OXYGEN SATURATION: 97 % | HEART RATE: 108 BPM | DIASTOLIC BLOOD PRESSURE: 78 MMHG | RESPIRATION RATE: 22 BRPM | SYSTOLIC BLOOD PRESSURE: 128 MMHG

## 2018-04-04 VITALS
RESPIRATION RATE: 35 BRPM | DIASTOLIC BLOOD PRESSURE: 68 MMHG | HEART RATE: 108 BPM | OXYGEN SATURATION: 99 % | SYSTOLIC BLOOD PRESSURE: 132 MMHG

## 2018-04-04 VITALS — OXYGEN SATURATION: 99 %

## 2018-04-04 VITALS — HEART RATE: 82 BPM

## 2018-04-04 VITALS
DIASTOLIC BLOOD PRESSURE: 85 MMHG | SYSTOLIC BLOOD PRESSURE: 156 MMHG | HEART RATE: 118 BPM | OXYGEN SATURATION: 97 % | RESPIRATION RATE: 25 BRPM

## 2018-04-04 VITALS
OXYGEN SATURATION: 98 % | HEART RATE: 108 BPM | SYSTOLIC BLOOD PRESSURE: 148 MMHG | DIASTOLIC BLOOD PRESSURE: 83 MMHG | RESPIRATION RATE: 34 BRPM

## 2018-04-04 VITALS
RESPIRATION RATE: 27 BRPM | HEART RATE: 110 BPM | SYSTOLIC BLOOD PRESSURE: 146 MMHG | OXYGEN SATURATION: 97 % | DIASTOLIC BLOOD PRESSURE: 81 MMHG

## 2018-04-04 VITALS
DIASTOLIC BLOOD PRESSURE: 72 MMHG | RESPIRATION RATE: 27 BRPM | OXYGEN SATURATION: 98 % | SYSTOLIC BLOOD PRESSURE: 139 MMHG | HEART RATE: 106 BPM

## 2018-04-04 VITALS
OXYGEN SATURATION: 100 % | DIASTOLIC BLOOD PRESSURE: 71 MMHG | SYSTOLIC BLOOD PRESSURE: 116 MMHG | RESPIRATION RATE: 25 BRPM | HEART RATE: 96 BPM | TEMPERATURE: 97.8 F

## 2018-04-04 VITALS
SYSTOLIC BLOOD PRESSURE: 124 MMHG | HEART RATE: 92 BPM | RESPIRATION RATE: 25 BRPM | DIASTOLIC BLOOD PRESSURE: 75 MMHG | OXYGEN SATURATION: 100 %

## 2018-04-04 VITALS — HEART RATE: 124 BPM

## 2018-04-04 VITALS — TEMPERATURE: 98.4 F

## 2018-04-04 VITALS
SYSTOLIC BLOOD PRESSURE: 121 MMHG | DIASTOLIC BLOOD PRESSURE: 66 MMHG | RESPIRATION RATE: 21 BRPM | OXYGEN SATURATION: 97 % | HEART RATE: 104 BPM

## 2018-04-04 VITALS
DIASTOLIC BLOOD PRESSURE: 72 MMHG | OXYGEN SATURATION: 97 % | SYSTOLIC BLOOD PRESSURE: 129 MMHG | RESPIRATION RATE: 19 BRPM | HEART RATE: 104 BPM

## 2018-04-04 VITALS
RESPIRATION RATE: 23 BRPM | HEART RATE: 104 BPM | OXYGEN SATURATION: 97 % | DIASTOLIC BLOOD PRESSURE: 60 MMHG | SYSTOLIC BLOOD PRESSURE: 123 MMHG

## 2018-04-04 VITALS — HEART RATE: 108 BPM

## 2018-04-04 VITALS
SYSTOLIC BLOOD PRESSURE: 134 MMHG | HEART RATE: 108 BPM | DIASTOLIC BLOOD PRESSURE: 72 MMHG | OXYGEN SATURATION: 99 % | RESPIRATION RATE: 20 BRPM

## 2018-04-04 VITALS
OXYGEN SATURATION: 97 % | RESPIRATION RATE: 35 BRPM | DIASTOLIC BLOOD PRESSURE: 84 MMHG | SYSTOLIC BLOOD PRESSURE: 144 MMHG | HEART RATE: 114 BPM

## 2018-04-04 VITALS — RESPIRATION RATE: 27 BRPM | OXYGEN SATURATION: 100 % | HEART RATE: 106 BPM

## 2018-04-04 VITALS — HEART RATE: 80 BPM

## 2018-04-04 VITALS — HEART RATE: 88 BPM

## 2018-04-04 VITALS — OXYGEN SATURATION: 96 %

## 2018-04-04 VITALS
RESPIRATION RATE: 27 BRPM | HEART RATE: 108 BPM | OXYGEN SATURATION: 98 % | DIASTOLIC BLOOD PRESSURE: 72 MMHG | SYSTOLIC BLOOD PRESSURE: 151 MMHG

## 2018-04-04 VITALS — HEART RATE: 104 BPM

## 2018-04-04 VITALS
RESPIRATION RATE: 31 BRPM | SYSTOLIC BLOOD PRESSURE: 164 MMHG | DIASTOLIC BLOOD PRESSURE: 91 MMHG | OXYGEN SATURATION: 97 % | HEART RATE: 124 BPM

## 2018-04-04 VITALS — OXYGEN SATURATION: 97 %

## 2018-04-04 VITALS — HEART RATE: 106 BPM

## 2018-04-04 VITALS — OXYGEN SATURATION: 100 %

## 2018-04-04 VITALS — HEART RATE: 118 BPM

## 2018-04-04 VITALS — HEART RATE: 116 BPM

## 2018-04-04 LAB
ALBUMIN SERPL-MCNC: 3.5 GM/DL (ref 3.4–5)
ALP SERPL-CCNC: 135 U/L (ref 45–117)
ALT SERPL-CCNC: 301 U/L (ref 12–78)
AST SERPL-CCNC: 23 U/L (ref 15–37)
BILIRUB INDIRECT SERPL-MCNC: 0.6 MG/DL (ref 0–0.8)
BILIRUB SERPL-MCNC: 1.3 MG/DL (ref 0.2–1)
BUN SERPL-MCNC: 172 MG/DL (ref 7–18)
CALCIUM SERPL-MCNC: 9.3 MG/DL (ref 8.5–10.1)
CHLORIDE SERPL-SCNC: 91 MEQ/L (ref 98–107)
CREAT SERPL-MCNC: 5.9 MG/DL (ref 0.6–1.3)
DIRECT BILIRUBIN ADULT: 0.7 MG/DL (ref 0–0.2)
GFR SERPLBLD BASED ON 1.73 SQ M-ARVRAT: 10 ML/MIN (ref 89–?)
GLUCOSE SERPL-MCNC: 131 MG/DL (ref 74–106)
HCO3 BLD-SCNC: 26.9 MEQ/L (ref 21–32)
PHOSPHATE SERPL-MCNC: 7.9 MG/DL (ref 2.5–4.9)
PROT SERPL-MCNC: 5.9 GM/DL (ref 6.4–8.2)
SODIUM SERPL-SCNC: 135 MEQ/L (ref 136–145)

## 2018-04-04 RX ADMIN — MUPIROCIN CALCIUM SCH APPLIC: 20 OINTMENT TOPICAL at 08:03

## 2018-04-04 RX ADMIN — STANDARDIZED SENNA CONCENTRATE AND DOCUSATE SODIUM SCH TAB: 8.6; 5 TABLET, FILM COATED ORAL at 08:03

## 2018-04-04 RX ADMIN — IPRATROPIUM BROMIDE AND ALBUTEROL SULFATE SCH AMPULE: .5; 3 SOLUTION RESPIRATORY (INHALATION) at 21:07

## 2018-04-04 RX ADMIN — PROPOFOL PRN MLS/HR: 10 INJECTION, EMULSION INTRAVENOUS at 00:06

## 2018-04-04 RX ADMIN — AMIODARONE HYDROCHLORIDE SCH MG: 200 TABLET ORAL at 16:00

## 2018-04-04 RX ADMIN — CHLORHEXIDINE GLUCONATE 0.12% ORAL RINSE SCH ML: 1.2 LIQUID ORAL at 22:16

## 2018-04-04 RX ADMIN — CHLORHEXIDINE GLUCONATE SCH PACK: 500 CLOTH TOPICAL at 04:00

## 2018-04-04 RX ADMIN — Medication SCH MG: at 08:04

## 2018-04-04 RX ADMIN — AZITHROMYCIN SCH MLS/HR: 500 INJECTION, POWDER, LYOPHILIZED, FOR SOLUTION INTRAVENOUS at 08:48

## 2018-04-04 RX ADMIN — IPRATROPIUM BROMIDE AND ALBUTEROL SULFATE SCH AMPULE: .5; 3 SOLUTION RESPIRATORY (INHALATION) at 15:31

## 2018-04-04 RX ADMIN — FAMOTIDINE SCH MG: 20 TABLET, FILM COATED ORAL at 08:04

## 2018-04-04 RX ADMIN — DIAZEPAM SCH MG: 10 TABLET ORAL at 05:52

## 2018-04-04 RX ADMIN — PREDNISONE SCH MG: 5 SOLUTION ORAL at 08:05

## 2018-04-04 RX ADMIN — Medication PRN ML: at 22:16

## 2018-04-04 RX ADMIN — CALCIUM ACETATE SCH MG: 667 CAPSULE ORAL at 08:04

## 2018-04-04 RX ADMIN — STANDARDIZED SENNA CONCENTRATE AND DOCUSATE SODIUM SCH TAB: 8.6; 5 TABLET, FILM COATED ORAL at 22:15

## 2018-04-04 RX ADMIN — FAMOTIDINE SCH MG: 20 TABLET, FILM COATED ORAL at 22:15

## 2018-04-04 RX ADMIN — CHLORHEXIDINE GLUCONATE 0.12% ORAL RINSE SCH ML: 1.2 LIQUID ORAL at 08:02

## 2018-04-04 RX ADMIN — HUMAN INSULIN SCH: 100 INJECTION, SOLUTION SUBCUTANEOUS at 22:15

## 2018-04-04 RX ADMIN — PROPOFOL PRN MLS/HR: 10 INJECTION, EMULSION INTRAVENOUS at 10:55

## 2018-04-04 RX ADMIN — WATER SCH ML: 1 IRRIGANT IRRIGATION at 08:04

## 2018-04-04 RX ADMIN — HUMAN INSULIN SCH: 100 INJECTION, SOLUTION SUBCUTANEOUS at 04:15

## 2018-04-04 RX ADMIN — HUMAN INSULIN SCH: 100 INJECTION, SOLUTION SUBCUTANEOUS at 16:08

## 2018-04-04 RX ADMIN — IPRATROPIUM BROMIDE AND ALBUTEROL SULFATE SCH AMPULE: .5; 3 SOLUTION RESPIRATORY (INHALATION) at 03:32

## 2018-04-04 RX ADMIN — CALCIUM ACETATE SCH MG: 667 CAPSULE ORAL at 13:23

## 2018-04-04 RX ADMIN — DIAZEPAM SCH MG: 10 TABLET ORAL at 22:15

## 2018-04-04 RX ADMIN — MUPIROCIN CALCIUM SCH APPLIC: 20 OINTMENT TOPICAL at 22:15

## 2018-04-04 RX ADMIN — AMIODARONE HYDROCHLORIDE SCH MG: 200 TABLET ORAL at 05:52

## 2018-04-04 RX ADMIN — ACYCLOVIR SCH TAB: 800 TABLET ORAL at 08:03

## 2018-04-04 RX ADMIN — HUMAN INSULIN SCH: 100 INJECTION, SOLUTION SUBCUTANEOUS at 13:27

## 2018-04-04 RX ADMIN — PHENYTOIN SODIUM SCH MLS/HR: 50 INJECTION INTRAMUSCULAR; INTRAVENOUS at 09:24

## 2018-04-04 RX ADMIN — HUMAN INSULIN SCH: 100 INJECTION, SOLUTION SUBCUTANEOUS at 08:09

## 2018-04-04 RX ADMIN — DIAZEPAM SCH MG: 10 TABLET ORAL at 13:23

## 2018-04-04 RX ADMIN — IPRATROPIUM BROMIDE AND ALBUTEROL SULFATE SCH AMPULE: .5; 3 SOLUTION RESPIRATORY (INHALATION) at 10:30

## 2018-04-04 RX ADMIN — CALCIUM ACETATE SCH MG: 667 CAPSULE ORAL at 18:43

## 2018-04-04 NOTE — HHI.NPPN
Subjective


History of Present Illness


67-year-old male with a past medical history of chronic 


obstructive pulmonary disease, history of deep venous thrombosis with 


pulmonary embolism, atrial fibrillation, hypertension, ischemic heart 


disease, congestive heart failure, who was admitted from the nursing 


home with generalized weakness and altered mental status.


I was called to see the patient because of elevated BUN and 


creatinine.


Additional Remarks


Patient remain on the vent, and sedated, remain unresponsive.





Review of Systems


General


General Remarks


Intubated and unresponsive.





Objective Data


Data











 4/4/18 4/5/18





 19:00 07:00


 


Intake Total 139 ml 


 


Balance 139 ml 


 


  


 


Intake Oral 0 ml 


 


IV Total 139 ml 











Vital Signs








  Date Time  Temp Pulse Resp B/P (MAP) Pulse Ox O2 Delivery O2 Flow Rate FiO2


 


4/4/18 17:00     98   35


 


4/4/18 15:00  108 22 128/78 (95) 97   


 


4/4/18 14:45  108 22 128/78 (95) 97   


 


4/4/18 14:29  114 35 144/84 (104) 97   


 


4/4/18 14:14  118      


 


4/4/18 14:14  118 25 156/85 (108) 97   


 


4/4/18 13:59  110 27 146/81 (102) 97   


 


4/4/18 13:59  110      


 


4/4/18 13:45     97   35


 


4/4/18 13:44  112      


 


4/4/18 13:44  112 27 154/92 (112) 97   


 


4/4/18 13:30  116      


 


4/4/18 13:29  108 20 134/72 (92) 99   


 


4/4/18 13:29  108      


 


4/4/18 13:18  112      


 


4/4/18 13:18  112 29 149/84 (105) 98   


 


4/4/18 13:14  114      


 


4/4/18 13:14  114 26 144/75 (98) 98   


 


4/4/18 12:59  104 21 121/66 (84) 97   


 


4/4/18 12:59  104      


 


4/4/18 12:44  104      


 


4/4/18 12:44  104 23 123/60 (81) 97   


 


4/4/18 12:30  106      


 


4/4/18 12:29  104 19 129/72 (91) 97   


 


4/4/18 12:29  104      


 


4/4/18 12:14  106 19 124/72 (89) 96   


 


4/4/18 12:14  106      


 


4/4/18 12:00 98.4       


 


4/4/18 12:00        35


 


4/4/18 11:59  108      


 


4/4/18 11:44  108      


 


4/4/18 11:30  124      


 


4/4/18 11:29  122 21 157/90 (112) 95   


 


4/4/18 11:29  122      


 


4/4/18 11:14  126      


 


4/4/18 11:14  126 22 173/81 (111) 95   


 


4/4/18 10:58  124 31 164/91 (115) 97   


 


4/4/18 10:58  124      


 


4/4/18 10:33     96   35


 


4/4/18 10:30  118      


 


4/4/18 10:30  118      


 


4/4/18 10:00  114      


 


4/4/18 10:00  76      


 


4/4/18 09:58  114 48 144/80 (101) 97   


 


4/4/18 08:58  102 42 120/99 (106) 98   


 


4/4/18 08:00  88      


 


4/4/18 08:00  76      


 


4/4/18 08:00        35


 


4/4/18 07:58 97.8 96 25 116/71 (86) 100   


 


4/4/18 07:53     100   35


 


4/4/18 07:26  76      


 


4/4/18 07:00  92 25 124/75 (91) 100   


 


4/4/18 06:00  92 34 123/68 (86) 99   


 


4/4/18 04:58  80 20 100/61 (74) 99   


 


4/4/18 04:39     99   35


 


4/4/18 04:00  82      


 


4/4/18 04:00        35


 


4/4/18 03:58 97.8 86 22 117/60 (79) 100   


 


4/4/18 02:58  86 20 98/56 (70) 99   


 


4/4/18 02:00  76      


 


4/4/18 01:58  84 20 120/77 (91) 99   


 


4/4/18 01:16     100   35


 


4/4/18 00:58  80 20 110/57 (74) 98   


 


4/4/18 00:00        35


 


4/4/18 00:00  80      


 


4/3/18 23:58 98.0 82 20 101/57 (72) 98   


 


4/3/18 22:59  90 20 115/66 (82) 94   


 


4/3/18 22:32  92 20 128/62 (84) 96   


 


4/3/18 22:01  88 20 149/79 (102) 100   


 


4/3/18 22:00  94      


 


4/3/18 21:53     100   35


 


4/3/18 21:31  88 22 130/84 (99) 100   


 


4/3/18 21:01  84 20 131/67 (88) 100   


 


4/3/18 20:01 98.8 90 20 139/72 (94) 100   


 


4/3/18 20:00  91      


 


4/3/18 20:00        35


 


4/3/18 19:40     100   35


 


4/3/18 19:31  90 20 119/62 (81) 100   


 


4/3/18 19:01  94 20 120/71 (87) 100   


 


4/3/18 18:00  92 33 126/67 (86) 100   


 


4/3/18 18:00  96      


 


4/3/18 17:51     99   35








-:  


4/3/18 0430                                                                    

            4/4/18 0450








Physical Exam


General


Appearance Remarks


Intubated and unresponsive, sedated.





Eyes


Eye Exam:  Pupils Equal





Pulmonary


Resp Exam:  Breath Sounds Equal, No Distress, Rhonchi, Decreased Bases, 

Diminished Breath Sounds





Gastrointestinal/Abdomen


GI Exam:  Soft, Non-Tender, Bowel Sounds Present, Distended





Extremeties


Extremities Exam:  Trace Edema





Neurologic


Neuro Exam:  Unresponsive





Assessment/Plan


Assessment Summary:  CLARE/Acute Renal Failure


Problem List:  


(1) COPD with exacerbation


ICD Codes:  J44.1 - Chronic obstructive pulmonary disease with (acute) 

exacerbation


(2) Shock liver


ICD Codes:  K72.00 - Acute and subacute hepatic failure without coma


(3) Hypoalbuminemia


ICD Codes:  E88.09 - Other disorders of plasma-protein metabolism, not 

elsewhere classified


(4) Encephalopathy


ICD Codes:  G93.40 - Encephalopathy, unspecified


(5) CHF (congestive heart failure)


ICD Codes:  I50.9 - Heart failure, unspecified


(6) Cardiac arrest


ICD Codes:  I46.9 - Cardiac arrest, cause unspecified


(7) Acute kidney injury


ICD Codes:  N17.9 - Acute kidney failure, unspecified


Plan





Patient has PEA and develop CLARE.


Most likely has ATN.


Now started passing the urine,


On Lasix and Metolazone.


Urine out put is good.


BUN and Creatinine continue to increase.


No urgent need for HD.


K is normal and Hco3 is not low.


Possibly has Encephalopathy related to Cardiac arrest.


BUN/Creatinine continue to increase.


Palliative care following.


Not much improvement in the BUN and Creatinine.


D/W the sister, she is considering Hospice and no HD.











Marita Riojas MD Apr 4, 2018 17:46

## 2018-04-04 NOTE — HHI.HCPN
Met with patient's sister, Katelynn, at bedside.  As the patient has not 

progressed and continues to decline, she plans to have him transferred to the 

[place] [facility] in the morning for end-of-life care.  She is 

requesting an early morning transfer.  I discussed this with Cheri Ruiz from 

hospice admissions and the earliest the patient can be transferred is 10:30 AM.

  The hospice admissions nurse, Kary, will contact the sister in a.m. for an 

update as to arrival time.


 (Sarah Farmer)


Chart reviewed.  Case discussed with palliative care ARNP.  Above ARNP note 

reviewed and I concur.


.


 (Deonte Carrizales MD)











Sarah Farmer Apr 4, 2018 19:16


Deonte Carrizales MD Apr 9, 2018 05:45

## 2018-04-04 NOTE — HHI.CCPN
Subjective


Remarks/Hospital Course


3/27: overnight, started to become purposeful in upper extremities, and pupils 

are 3mm and now reactive. remains in shock with shock liver, acute kidney injury

, multiorgan failure. LFTs uptrending.





3/28: improvement in mental status. now following commands. discussion with 

sister: patient drinks heavily: 2-3 6-packs/day. liver enzymes still elevated, 

remains oliguric with Cr > 3 now and rising. albumin continues to fall which is 

likely combination of very poor nutritional status and poor liver synthetic 

function. blood sugar slightly improved. overall still in multiorgan failure. 

family appears to have poor insight into patient's condition: sister herself is 

going through rehab for a recent severe illness. now also starting tremors 

which may be early alcohol withdraw given new clinical history to suggest large 

etoh intake.





3/29: improvements in hemodynamics and off vasopressors. wbc downtrending. but 

no improvement and worsening in metabolic organ failure: Cr higher, remains 

oliguric. LFTs still high. remains on d10w and tube feeds with marginal blood 

glucose. acidosis continues to worsen despite adequate cardiac index.





3/30: kidney function continues to worsen, though uop slightly improved from 

yesterday. remains off vasopressors. afib RVR yesterday now controlled with 

amiodarone. overall no meaningful improvements- acidosis still too severe to 

allow extubation and renal function complicating acidosis. very poor dialysis 

candidate given overall functionality and current acute illness.





3/31: renal function worse, however now non-oliguric. off vasopressors. staph 

epi growing 2 different sensitivity profiles, so most likely contaminated 

sample. acidosis slightly better. 





4/1: Patient remains intubated critical.  Currently off sedation remains 

encephalopathic and very weak.  Very weakly followed commands by squeezing hand 

on the left hand, wiggled toes right foot.  Worsening renal failure  

creatinine 5 today.  Remains nonoliguric approximately 4 L urine output.  

Unless there is significant neurology clinical improvement he is not a 

candidate for hemodialysis.





04/02: No improvement of renal function. CXR looks like pulmonary venous 

congestion; suspect LV function is significantly reduced. ECHO report has a 

mixed interpretation, but looks like decompensated failure. We'll continue 

diuresis. Not really any role for inotropic agents. Better rate control will 

likely just produce depressed contractility. I'll try incremental digoxin 

adjusted for kidney function. Shock liver resolving. Afebrile but rising white 

count bears watching closely. The entire collection of medical problems and 

impaired neurological recovery makes long-term survival highly unlikely.





04/03: Continued adequate response to diuretics but azotemia worsening, 

consistent with acutely decreased ventricular function. The balancing act 

between heart failure and renal failure continues. This bodes poorly for his 

recovery from this illness. I would recommend avoiding tracheostomy or 

hemodialysis. His neurological recovery will be limited from this point forward.





04/04: No improvement in renal or neurological function. Although renal pattern 

is prerenal azotemia this is the result of inadequate cardiac function and our 

attempts with diuretics to clear up his pulmonary congestion. Prognosis remains 

poor and chance for meaningful recovery is zero.





Objective





Vital Signs








  Date Time  Temp Pulse Resp B/P (MAP) Pulse Ox O2 Delivery O2 Flow Rate FiO2


 


4/4/18 04:58  80 20 100/61 (74) 99   


 


4/4/18 04:39        35


 


4/4/18 03:58 97.8       














Intake and Output   


 


 4/4/18 4/4/18 4/5/18





 08:00 16:00 00:00


 


Intake Total 1396 ml  


 


Output Total 1160 ml  


 


Balance 236 ml  








Result Diagram:  


4/3/18 0430                                                                    

            4/4/18 0450





Objective Remarks


gen: 67-year-old  male, lying in bed, intubated, off sedation


heent: pupils 2mm, equal, reactive slowly. mucous membranes moist. nc. at.


neck: + jvd. trachea midline.


chest: equal chest rise. coarse breath sounds, scattered coarse rhonchi.


cv: Hypertensive, irregularly irregular rhythm. afib. rate in 80s now after 

digoxin


abd: soft, nontender, nondistended. no guarding. quiet.


extr: distal pulses 2+. no peripheral edema. right radial art line in place, 

site c/d/i.


neuro: Eyes open intermittently spontaneously, no purposeful movements, no 

tracking..  +cough. + gag. + corneals.





A/P


Assessment and Plan








 Plan 


A/P


Assessment and Plan


Assessment: 67yM who presented in extremis and progressed rapidly to PEA arrest

, most likely secondary to septic shock, and COPD exacerbation blood with Coag 

negative staph in 4/4 bottles. Unclear source of infection as sputum and urine 

cultures have not produced organism. He remains in multiorgan failure and his 

liver and kidneys are still severely dysfunctional without signs of 

improvement. remains critically ill. After long discussion with his sister and 

medical decision maker, plan to make him DNR with continued aggressive care.  

Acidosis and renal failure are biggest barriers today. will consult nephrology, 

although he would be an exceedingly poor renal replacement candidate given his 

pre-hospital poor functional status, his critical illness, and his multi organ 

dysfunction.  Aggressive goals remain and he continues to be critically ill and 

off pathway. Short term and long term outlook is quite poor and family appears 

to have decided to proceed directly to hospice.





Plan by systems:





Neurologic:


Hypoxic-ischemic encephalopathy


Metabolic encephalopathy 


Alcohol Dependence


Alcohol withdrawal syndrome


Frequent neuro checks


Discontinue propofol and fentanyl


Start Precedex to facilitate ventilator weaning and neuro exam


iv thiamine, mvi


valium 5mg po q8h


clonidine 0.2mg po q8h.


Watch closely for alcohol withdrawal





Respiratory:


Acute hypoxic and hypercarbic respiratory failure - persistent.


Vent bundle


Head of bed at 30


Nebs


Daily SBTs-mental status will not permit extubation


Wean FiO2 for goal SPO2 greater than 90%


CT chest 3/29: resolving consolidation with small right effusion and middle 

lobe infiltrate.





Cardiovascular:


Septic shock - resolved.


Status post PEA arrest


Atrial fibrillation with rapid ventricular response


continue amiodarone 200mg po q12h.


clonidine 0.2mg po q8h


Continue IV Lasix as below


Digoxin 0.25 iv now, repaet after 4 hours if pulse > 90. He'll certainly 

tolerate up to 0.75 mg without toxicity.





Renal:


Acute kidney injury-worsening


Secondary septic shock, ATN


Continue Haynes


renal ultrasound 3/28: no evidence of hydronephrosis.


-- Strict I/Os


nephrology consult. Dr Riojas following


Very poor dialysis candidate, unless there is significant improvement in 

mentation


very volume overloaded.  Currently diuresis with lasix 100mg iv q6h and give 

with albumin 25gm q6h.


Reduced to 100 mg q12 by Dr. Riojas, as the BUN creat continue to rise. On 

metolazone 10 mg q12


Defer for dialysis decision to Dr. Riojas. Poor candidate secondary to poor 

overall prognosis





FEN/GI:


Acute protein calorie malnutrition -severe


Shock liver


Hyperkalemia


tube feeds


Trend LFTs


nutrition consult





Heme/ID:


Septic shock


Leukocytosis


holding vancomycin.  Consult pharmacy to dose


d/c azithro and zosyn.


check vanco level.


urine and sputum cultures NG


Blood cultures, coag negative staph 4/4 bottles, but different sensitivities: 

likely contaminant.  Repeat cultures negative today


urinary legionella and pneumococcal Ag: negative.


wbc and fever curve trending up again. 





Endocrine:


Hypoglycemia- improving.


Secondary to shock liver


d/c d10w


-- SSI





Prophylaxis:


GI Prophylaxis


IV Pepcid





DVT Prophylaxis


-- SCDs


heparin drip





Lines:


3/26 left subclavian triple-lumen catheter


3/26 right radial arterial line


Haynes





Dispo:


Remain in ICU.  Remains critically ill. Neurological recovery has been minimal 

and not likely to improve much more at this point. Urgent dilemma remains 

systolic heart failure and renal failure, which precludes recovery in the 

context of the anoxic brain injury. Hospice is highly recommended. I have 

talked with his sister at length.





This patient remains critically ill with one or more organ systems which are or 

may become a threat to life.  I have spent in excess of 35 minutes 

discontinuously in the care and management of this patient.  This time is 

exclusive of procedures, and includes, but is not limited to, evaluation of the 

patient, review of the medical record, discussions with family, consultants, 

nursing staff, or respiratory therapy, and documentation in the medical record.











Jonathan Dior MD Apr 4, 2018 07:12

## 2018-04-05 VITALS
DIASTOLIC BLOOD PRESSURE: 58 MMHG | RESPIRATION RATE: 37 BRPM | SYSTOLIC BLOOD PRESSURE: 97 MMHG | OXYGEN SATURATION: 99 % | HEART RATE: 86 BPM

## 2018-04-05 VITALS
HEART RATE: 104 BPM | RESPIRATION RATE: 20 BRPM | SYSTOLIC BLOOD PRESSURE: 155 MMHG | DIASTOLIC BLOOD PRESSURE: 93 MMHG | OXYGEN SATURATION: 100 %

## 2018-04-05 VITALS
RESPIRATION RATE: 34 BRPM | DIASTOLIC BLOOD PRESSURE: 60 MMHG | OXYGEN SATURATION: 100 % | SYSTOLIC BLOOD PRESSURE: 97 MMHG | HEART RATE: 82 BPM

## 2018-04-05 VITALS
OXYGEN SATURATION: 98 % | TEMPERATURE: 97.8 F | DIASTOLIC BLOOD PRESSURE: 69 MMHG | SYSTOLIC BLOOD PRESSURE: 119 MMHG | RESPIRATION RATE: 22 BRPM | HEART RATE: 90 BPM

## 2018-04-05 VITALS
SYSTOLIC BLOOD PRESSURE: 108 MMHG | DIASTOLIC BLOOD PRESSURE: 66 MMHG | HEART RATE: 94 BPM | RESPIRATION RATE: 39 BRPM | OXYGEN SATURATION: 99 %

## 2018-04-05 VITALS
RESPIRATION RATE: 38 BRPM | SYSTOLIC BLOOD PRESSURE: 89 MMHG | HEART RATE: 96 BPM | DIASTOLIC BLOOD PRESSURE: 55 MMHG | OXYGEN SATURATION: 98 %

## 2018-04-05 VITALS
OXYGEN SATURATION: 98 % | DIASTOLIC BLOOD PRESSURE: 73 MMHG | RESPIRATION RATE: 38 BRPM | SYSTOLIC BLOOD PRESSURE: 104 MMHG | HEART RATE: 96 BPM

## 2018-04-05 VITALS
RESPIRATION RATE: 34 BRPM | SYSTOLIC BLOOD PRESSURE: 116 MMHG | OXYGEN SATURATION: 98 % | HEART RATE: 94 BPM | DIASTOLIC BLOOD PRESSURE: 65 MMHG

## 2018-04-05 VITALS
SYSTOLIC BLOOD PRESSURE: 86 MMHG | HEART RATE: 92 BPM | OXYGEN SATURATION: 98 % | DIASTOLIC BLOOD PRESSURE: 56 MMHG | RESPIRATION RATE: 38 BRPM

## 2018-04-05 VITALS
OXYGEN SATURATION: 100 % | SYSTOLIC BLOOD PRESSURE: 154 MMHG | RESPIRATION RATE: 20 BRPM | HEART RATE: 99 BPM | DIASTOLIC BLOOD PRESSURE: 73 MMHG

## 2018-04-05 VITALS — OXYGEN SATURATION: 97 % | RESPIRATION RATE: 29 BRPM | HEART RATE: 106 BPM

## 2018-04-05 VITALS
SYSTOLIC BLOOD PRESSURE: 96 MMHG | HEART RATE: 94 BPM | RESPIRATION RATE: 48 BRPM | DIASTOLIC BLOOD PRESSURE: 56 MMHG | OXYGEN SATURATION: 98 % | TEMPERATURE: 97.9 F

## 2018-04-05 VITALS
RESPIRATION RATE: 37 BRPM | DIASTOLIC BLOOD PRESSURE: 66 MMHG | HEART RATE: 92 BPM | OXYGEN SATURATION: 100 % | SYSTOLIC BLOOD PRESSURE: 104 MMHG

## 2018-04-05 VITALS
OXYGEN SATURATION: 100 % | TEMPERATURE: 98.3 F | HEART RATE: 108 BPM | SYSTOLIC BLOOD PRESSURE: 140 MMHG | RESPIRATION RATE: 20 BRPM | DIASTOLIC BLOOD PRESSURE: 78 MMHG

## 2018-04-05 VITALS
RESPIRATION RATE: 22 BRPM | HEART RATE: 88 BPM | DIASTOLIC BLOOD PRESSURE: 58 MMHG | OXYGEN SATURATION: 100 % | SYSTOLIC BLOOD PRESSURE: 121 MMHG

## 2018-04-05 VITALS
SYSTOLIC BLOOD PRESSURE: 118 MMHG | HEART RATE: 94 BPM | DIASTOLIC BLOOD PRESSURE: 79 MMHG | OXYGEN SATURATION: 98 % | RESPIRATION RATE: 41 BRPM

## 2018-04-05 VITALS
RESPIRATION RATE: 45 BRPM | DIASTOLIC BLOOD PRESSURE: 56 MMHG | SYSTOLIC BLOOD PRESSURE: 93 MMHG | OXYGEN SATURATION: 98 % | HEART RATE: 92 BPM

## 2018-04-05 VITALS — OXYGEN SATURATION: 98 %

## 2018-04-05 VITALS — RESPIRATION RATE: 39 BRPM | OXYGEN SATURATION: 98 % | HEART RATE: 92 BPM

## 2018-04-05 VITALS — OXYGEN SATURATION: 100 %

## 2018-04-05 VITALS — HEART RATE: 98 BPM | RESPIRATION RATE: 38 BRPM

## 2018-04-05 VITALS
SYSTOLIC BLOOD PRESSURE: 160 MMHG | OXYGEN SATURATION: 100 % | DIASTOLIC BLOOD PRESSURE: 86 MMHG | HEART RATE: 104 BPM | RESPIRATION RATE: 20 BRPM

## 2018-04-05 RX ADMIN — HUMAN INSULIN SCH: 100 INJECTION, SOLUTION SUBCUTANEOUS at 04:15

## 2018-04-05 RX ADMIN — PROPOFOL PRN MLS/HR: 10 INJECTION, EMULSION INTRAVENOUS at 08:27

## 2018-04-05 RX ADMIN — DIAZEPAM SCH MG: 10 TABLET ORAL at 06:54

## 2018-04-05 RX ADMIN — CHLORHEXIDINE GLUCONATE SCH PACK: 500 CLOTH TOPICAL at 04:00

## 2018-04-05 RX ADMIN — ACYCLOVIR SCH TAB: 800 TABLET ORAL at 08:04

## 2018-04-05 RX ADMIN — MUPIROCIN CALCIUM SCH APPLIC: 20 OINTMENT TOPICAL at 08:03

## 2018-04-05 RX ADMIN — HUMAN INSULIN SCH: 100 INJECTION, SOLUTION SUBCUTANEOUS at 08:15

## 2018-04-05 RX ADMIN — PROPOFOL PRN MLS/HR: 10 INJECTION, EMULSION INTRAVENOUS at 02:24

## 2018-04-05 RX ADMIN — HUMAN INSULIN SCH: 100 INJECTION, SOLUTION SUBCUTANEOUS at 00:07

## 2018-04-05 RX ADMIN — Medication SCH MG: at 08:04

## 2018-04-05 RX ADMIN — CHLORHEXIDINE GLUCONATE 0.12% ORAL RINSE SCH ML: 1.2 LIQUID ORAL at 08:03

## 2018-04-05 RX ADMIN — STANDARDIZED SENNA CONCENTRATE AND DOCUSATE SODIUM SCH TAB: 8.6; 5 TABLET, FILM COATED ORAL at 08:04

## 2018-04-05 RX ADMIN — WATER SCH ML: 1 IRRIGANT IRRIGATION at 08:03

## 2018-04-05 RX ADMIN — PHENYTOIN SODIUM SCH MLS/HR: 50 INJECTION INTRAMUSCULAR; INTRAVENOUS at 08:17

## 2018-04-05 RX ADMIN — PREDNISONE SCH MG: 5 SOLUTION ORAL at 08:04

## 2018-04-05 RX ADMIN — FAMOTIDINE SCH MG: 20 TABLET, FILM COATED ORAL at 08:03

## 2018-04-05 RX ADMIN — IPRATROPIUM BROMIDE AND ALBUTEROL SULFATE SCH AMPULE: .5; 3 SOLUTION RESPIRATORY (INHALATION) at 04:05

## 2018-04-05 RX ADMIN — IPRATROPIUM BROMIDE AND ALBUTEROL SULFATE SCH AMPULE: .5; 3 SOLUTION RESPIRATORY (INHALATION) at 10:00

## 2018-04-05 RX ADMIN — AMIODARONE HYDROCHLORIDE SCH MG: 200 TABLET ORAL at 03:03

## 2018-04-05 NOTE — HHI.CCPN
Subjective


Remarks/Hospital Course


3/27: overnight, started to become purposeful in upper extremities, and pupils 

are 3mm and now reactive. remains in shock with shock liver, acute kidney injury

, multiorgan failure. LFTs uptrending.





3/28: improvement in mental status. now following commands. discussion with 

sister: patient drinks heavily: 2-3 6-packs/day. liver enzymes still elevated, 

remains oliguric with Cr > 3 now and rising. albumin continues to fall which is 

likely combination of very poor nutritional status and poor liver synthetic 

function. blood sugar slightly improved. overall still in multiorgan failure. 

family appears to have poor insight into patient's condition: sister herself is 

going through rehab for a recent severe illness. now also starting tremors 

which may be early alcohol withdraw given new clinical history to suggest large 

etoh intake.





3/29: improvements in hemodynamics and off vasopressors. wbc downtrending. but 

no improvement and worsening in metabolic organ failure: Cr higher, remains 

oliguric. LFTs still high. remains on d10w and tube feeds with marginal blood 

glucose. acidosis continues to worsen despite adequate cardiac index.





3/30: kidney function continues to worsen, though uop slightly improved from 

yesterday. remains off vasopressors. afib RVR yesterday now controlled with 

amiodarone. overall no meaningful improvements- acidosis still too severe to 

allow extubation and renal function complicating acidosis. very poor dialysis 

candidate given overall functionality and current acute illness.





3/31: renal function worse, however now non-oliguric. off vasopressors. staph 

epi growing 2 different sensitivity profiles, so most likely contaminated 

sample. acidosis slightly better. 





4/1: Patient remains intubated critical.  Currently off sedation remains 

encephalopathic and very weak.  Very weakly followed commands by squeezing hand 

on the left hand, wiggled toes right foot.  Worsening renal failure  

creatinine 5 today.  Remains nonoliguric approximately 4 L urine output.  

Unless there is significant neurology clinical improvement he is not a 

candidate for hemodialysis.





04/02: No improvement of renal function. CXR looks like pulmonary venous 

congestion; suspect LV function is significantly reduced. ECHO report has a 

mixed interpretation, but looks like decompensated failure. We'll continue 

diuresis. Not really any role for inotropic agents. Better rate control will 

likely just produce depressed contractility. I'll try incremental digoxin 

adjusted for kidney function. Shock liver resolving. Afebrile but rising white 

count bears watching closely. The entire collection of medical problems and 

impaired neurological recovery makes long-term survival highly unlikely.





04/03: Continued adequate response to diuretics but azotemia worsening, 

consistent with acutely decreased ventricular function. The balancing act 

between heart failure and renal failure continues. This bodes poorly for his 

recovery from this illness. I would recommend avoiding tracheostomy or 

hemodialysis. His neurological recovery will be limited from this point forward.





04/04: No improvement in renal or neurological function. Although renal pattern 

is prerenal azotemia this is the result of inadequate cardiac function and our 

attempts with diuretics to clear up his pulmonary congestion. Prognosis remains 

poor and chance for meaningful recovery is zero.





04/05: No improvement in neurological function or renal function. No chance for 

meaningful recovery. Agree with plans for Hospice disposition.





Objective





Vital Signs








  Date Time  Temp Pulse Resp B/P (MAP) Pulse Ox O2 Delivery O2 Flow Rate FiO2


 


4/5/18 06:00  99      


 


4/5/18 06:00   20 154/73 (100) 100   


 


4/5/18 04:05        35


 


4/5/18 04:00 98.3       














Intake and Output   


 


 4/5/18 4/5/18 4/6/18





 08:00 16:00 00:00


 


Intake Total 1202 ml  


 


Output Total 900 ml  


 


Balance 302 ml  








Result Diagram:  


4/3/18 0430                                                                    

            4/4/18 0450





Objective Remarks


gen: 67-year-old  male, lying in bed, intubated, off sedation


heent: pupils 2mm, equal, reactive slowly. mucous membranes moist. 


neck: + jvd. trachea midline.


chest: equal chest rise. coarse breath sounds, scattered coarse rhonchi. mobile 

secretions.


cv: Hypertensive, irregularly irregular rhythm. afib. rate in 80s now after 

digoxin


abd: soft, nontender, nondistended. no guarding. quiet.


extr: distal pulses 2+. no peripheral edema. 


neuro: Eyes open intermittently spontaneously, no purposeful movements, no 

tracking..  +cough. + gag. + corneals.





A/P


Assessment and Plan








 Plan 


A/P


Assessment and Plan


Assessment: 67yM who presented in extremis and progressed rapidly to PEA arrest

, most likely secondary to septic shock, and COPD exacerbation blood with Coag 

negative staph in 4/4 bottles. Unclear source of infection as sputum and urine 

cultures have not produced organism. He remains in multiorgan failure and his 

liver and kidneys are still severely dysfunctional without signs of 

improvement. remains critically ill. After long discussion with his sister and 

medical decision maker, plan to make him DNR with continued aggressive care.  

Acidosis and renal failure are biggest barriers today. will consult nephrology, 

although he would be an exceedingly poor renal replacement candidate given his 

pre-hospital poor functional status, his critical illness, and his multi organ 

dysfunction.  Aggressive goals remain and he continues to be critically ill and 

off pathway. Short term and long term outlook is quite poor and family appears 

to have decided to proceed directly to hospice.





Plan by systems:





Neurologic:


Hypoxic-ischemic encephalopathy


Metabolic encephalopathy 


Alcohol Dependence


Alcohol withdrawal syndrome


Frequent neuro checks


Discontinue propofol and fentanyl


Start Precedex to facilitate ventilator weaning and neuro exam


iv thiamine, mvi


valium 5mg po q8h


clonidine 0.2mg po q8h.


Watch closely for alcohol withdrawal





Respiratory:


Acute hypoxic and hypercarbic respiratory failure - persistent.


Vent bundle


Head of bed at 30


Nebs


Daily SBTs-mental status will not permit extubation


Wean FiO2 for goal SPO2 greater than 90%


CT chest 3/29: resolving consolidation with small right effusion and middle 

lobe infiltrate.





Cardiovascular:


Septic shock - resolved.


Status post PEA arrest


Atrial fibrillation with rapid ventricular response


continue amiodarone 200mg po q12h.


clonidine 0.2mg po q8h


Continue IV Lasix as below


Digoxin 0.25 iv now, repaet after 4 hours if pulse > 90. He'll certainly 

tolerate up to 0.75 mg without toxicity.





Renal:


Acute kidney injury-worsening


Secondary septic shock, ATN


Continue Haynes


renal ultrasound 3/28: no evidence of hydronephrosis.


-- Strict I/Os


nephrology consult. Dr Riojas following


Very poor dialysis candidate, unless there is significant improvement in 

mentation


very volume overloaded.  Currently diuresis with lasix 100mg iv q6h and give 

with albumin 25gm q6h.


Reduced to 100 mg q12 by Dr. Riojas, as the BUN creat continue to rise. On 

metolazone 10 mg q12


Defer for dialysis decision to Dr. Riojas. Poor candidate secondary to poor 

overall prognosis





FEN/GI:


Acute protein calorie malnutrition -severe


Shock liver


Hyperkalemia


tube feeds


Trend LFTs


nutrition consult





Heme/ID:


Septic shock


Leukocytosis


holding vancomycin.  Consult pharmacy to dose


d/c azithro and zosyn.


check vanco level.


urine and sputum cultures NG


Blood cultures, coag negative staph 4/4 bottles, but different sensitivities: 

likely contaminant.  Repeat cultures negative today


urinary legionella and pneumococcal Ag: negative.


wbc and fever curve trending up again. 





Endocrine:


Hypoglycemia- improving.


Secondary to shock liver


d/c d10w


-- SSI





Prophylaxis:


GI Prophylaxis


IV Pepcid





DVT Prophylaxis


-- SCDs


heparin drip





Lines:


3/26 left subclavian triple-lumen catheter


3/26 right radial arterial line


Haynes





Dispo:


Remain in ICU.  Remains critically ill. Neurological recovery has been minimal 

and not likely to improve much more at this point. Urgent dilemma remains 

systolic heart failure and renal failure, which precludes recovery in the 

context of the anoxic brain injury. Hospice is highly recommended. I have 

talked with his sister at length. Plan for transfer to [facility] today.











Jonathan Dior MD Apr 5, 2018 06:53

## 2023-12-30 NOTE — EKG
Date Performed: 03/26/2018       Time Performed: 11:07:58

 

PTAGE:      67 years

 

EKG:      SINUS TACHYCARDIA WITH SHORT MI INTERVAL MARKED RIGHT AXIS DEVIATION RIGHT BUNDLE BRANCH BL
OCK ABNORMAL ECG

 

PREVIOUS TRACING       : 03/26/2018 08.46

 

DOCTOR:   Umesh Lugo  Interpretating Date/Time  03/26/2018 23:58:23 Discharged